# Patient Record
Sex: MALE | Race: WHITE | NOT HISPANIC OR LATINO | Employment: OTHER | URBAN - METROPOLITAN AREA
[De-identification: names, ages, dates, MRNs, and addresses within clinical notes are randomized per-mention and may not be internally consistent; named-entity substitution may affect disease eponyms.]

---

## 2020-01-25 ENCOUNTER — OFFICE VISIT (OUTPATIENT)
Dept: URGENT CARE | Facility: CLINIC | Age: 75
End: 2020-01-25
Payer: MEDICARE

## 2020-01-25 VITALS
HEART RATE: 62 BPM | DIASTOLIC BLOOD PRESSURE: 77 MMHG | WEIGHT: 130 LBS | OXYGEN SATURATION: 99 % | BODY MASS INDEX: 21.66 KG/M2 | HEIGHT: 65 IN | SYSTOLIC BLOOD PRESSURE: 160 MMHG | RESPIRATION RATE: 14 BRPM | TEMPERATURE: 98 F

## 2020-01-25 DIAGNOSIS — H00.012 HORDEOLUM EXTERNUM OF RIGHT LOWER EYELID: Primary | ICD-10-CM

## 2020-01-25 DIAGNOSIS — L03.90 CELLULITIS, UNSPECIFIED CELLULITIS SITE: ICD-10-CM

## 2020-01-25 DIAGNOSIS — H57.89 PERIORBITAL SWELLING: ICD-10-CM

## 2020-01-25 PROCEDURE — 99203 OFFICE O/P NEW LOW 30 MIN: CPT | Mod: S$GLB,,, | Performed by: NURSE PRACTITIONER

## 2020-01-25 PROCEDURE — 99203 PR OFFICE/OUTPT VISIT, NEW, LEVL III, 30-44 MIN: ICD-10-PCS | Mod: S$GLB,,, | Performed by: NURSE PRACTITIONER

## 2020-01-25 RX ORDER — ATENOLOL 25 MG/1
TABLET ORAL
COMMUNITY

## 2020-01-25 RX ORDER — ATORVASTATIN CALCIUM 40 MG/1
40 TABLET, FILM COATED ORAL DAILY
COMMUNITY

## 2020-01-25 RX ORDER — AMLODIPINE BESYLATE 10 MG/1
10 TABLET ORAL DAILY
COMMUNITY

## 2020-01-25 RX ORDER — CEPHALEXIN 500 MG/1
500 CAPSULE ORAL EVERY 8 HOURS
Qty: 21 CAPSULE | Refills: 0 | Status: SHIPPED | OUTPATIENT
Start: 2020-01-25 | End: 2020-01-25

## 2020-01-25 RX ORDER — ERYTHROMYCIN 5 MG/G
OINTMENT OPHTHALMIC 3 TIMES DAILY
Qty: 1 G | Refills: 0 | Status: SHIPPED | OUTPATIENT
Start: 2020-01-25 | End: 2020-02-01

## 2020-01-25 RX ORDER — ERYTHROMYCIN 5 MG/G
OINTMENT OPHTHALMIC 3 TIMES DAILY
Qty: 1 G | Refills: 0 | Status: SHIPPED | OUTPATIENT
Start: 2020-01-25 | End: 2020-01-25

## 2020-01-25 RX ORDER — CEPHALEXIN 500 MG/1
500 CAPSULE ORAL EVERY 8 HOURS
Qty: 21 CAPSULE | Refills: 0 | Status: SHIPPED | OUTPATIENT
Start: 2020-01-25 | End: 2020-02-01

## 2020-01-25 RX ORDER — NAPROXEN SODIUM 220 MG/1
81 TABLET, FILM COATED ORAL DAILY
COMMUNITY

## 2020-01-25 RX ORDER — QUINAPRIL 40 MG/1
40 TABLET ORAL NIGHTLY
COMMUNITY

## 2020-01-25 NOTE — PROGRESS NOTES
"Subjective:       Patient ID: Waldemar Mariscal is a 74 y.o. male.    Vitals:  height is 5' 5" (1.651 m) and weight is 59 kg (130 lb). His oral temperature is 97.9 °F (36.6 °C). His blood pressure is 160/77 (abnormal) and his pulse is 62. His respiration is 14 and oxygen saturation is 99%.     Chief Complaint: Eye Problem    Patient c/o right lower eyelid swelling & redness that started 3 days ago.     Eye Problem    The right eye is affected. This is a new problem. The current episode started in the past 7 days. The problem occurs constantly. The problem has been gradually worsening. There was no injury mechanism. The pain is at a severity of 1/10. The pain is mild. There is no known exposure to pink eye. He does not wear contacts. Associated symptoms include eye redness. Pertinent negatives include no blurred vision, eye discharge, double vision, fever, foreign body sensation, itching, nausea, photophobia, recent URI or vomiting. He has tried nothing for the symptoms.       Constitution: Negative for chills and fever.   HENT: Negative for congestion and sinus pain.    Eyes: Positive for eye pain and eye redness. Negative for eye trauma, foreign body in eye, eye discharge, eye itching, photophobia, vision loss, double vision, blurred vision and eyelid swelling.   Gastrointestinal: Negative for nausea and vomiting.   Skin: Negative for rash.   Allergic/Immunologic: Negative for seasonal allergies and itching.   Neurological: Negative for headaches.       Objective:      Physical Exam   Constitutional: He is oriented to person, place, and time. He appears well-developed and well-nourished.   HENT:   Head: Normocephalic and atraumatic. Head is with right periorbital erythema. Head is without abrasion and without laceration.       Right Ear: External ear normal.   Left Ear: External ear normal.   Nose: Nose normal.   Mouth/Throat: Oropharynx is clear and moist.   Eyes: Pupils are equal, round, and reactive to light. " Conjunctivae and EOM are normal. Right eye exhibits hordeolum.       Neck: Trachea normal, full passive range of motion without pain and phonation normal. Neck supple.   Cardiovascular: Normal rate, regular rhythm and normal heart sounds.   Pulmonary/Chest: Effort normal and breath sounds normal. He has no decreased breath sounds. He has no wheezes. He has no rhonchi.   Musculoskeletal: Normal range of motion.   Neurological: He is alert and oriented to person, place, and time.   Skin: Skin is warm, dry and intact.   Psychiatric: He has a normal mood and affect. His speech is normal and behavior is normal. Judgment and thought content normal. Cognition and memory are normal.   Nursing note and vitals reviewed.        Assessment:       1. Hordeolum externum of right lower eyelid    2. Periorbital swelling    3. Cellulitis, unspecified cellulitis site        Plan:       May use warm compresses  PO abx for cellulitis.  ED precautions given to pt.  Ointment for stye.     Hordeolum externum of right lower eyelid  -     erythromycin (ROMYCIN) ophthalmic ointment; Place into the right eye 3 (three) times daily. for 7 days  Dispense: 1 g; Refill: 0    Periorbital swelling  -     cephALEXin (KEFLEX) 500 MG capsule; Take 1 capsule (500 mg total) by mouth every 8 (eight) hours. for 7 days  Dispense: 21 capsule; Refill: 0    Cellulitis, unspecified cellulitis site  -     cephALEXin (KEFLEX) 500 MG capsule; Take 1 capsule (500 mg total) by mouth every 8 (eight) hours. for 7 days  Dispense: 21 capsule; Refill: 0         Patient Instructions       Sty (or Stye)  A sty is an infection of the oil gland of the eyelid. It may develop into a small pocket of pus (abscess). This can cause pain, redness, and swelling. In early stages, styes are treated with antibiotic cream, eye drops, or warm packs (small towels soaked in warm water). More severe cases may need to be opened and drained by a health care provider.  Home care  · Eye drops  or ointment are usually prescribed to treat the infection. Use these as directed.   ¨ Artificial tears may also be used to lubricate the eye and make it more comfortable. These may be purchased without a prescription.   ¨ Talk to your health care provider before using any over-the-counter treatment for a sty.  · Apply a warm, damp towel to the affected eye for at least 5 minutes, 3 to 4 times a day for a week. Warm compresses open the pores and speed the healing. If the compresses are too hot, they may burn your eyelid.  · Sometimes the sty will drain with this treatment alone. If this happens, continue the antibiotic until all the redness and swelling are gone.  · Wash your hands before and after touching the infected eye to avoid spreading the infection.  · Do not squeeze or try to puncture the sty.  Follow-up care  Follow up with your health care provider, or as advised.   When to seek medical advice  Call your health care provider right away if you have:  · Increase in swelling or redness around the eyelid after 48 to 72 hours  · Increase in eye pain or the eyelid blisters  · Increase in warmth--the eyelid feels hot  · Drainage of blood or thick pus from the sty  · Blister on the eyelid  · Inability to open the eyelid due to swelling  · Fever  ¨ 1 degree above your normal temperature lasting for 24 to 48 hours, or  ¨ Whatever your health care provider told you to report based on your medical condition  · Vision changes  · Headache or stiff neck  · Recurrence of the sty  Date Last Reviewed: 6/14/2015  © 2649-0433 [employer]. [address] [place] [address], [place] [address]. All rights reserved. This information is not intended as a substitute for professional medical care. Always follow your healthcare professional's instructions.        Discharge Instructions for Cellulitis  You have been diagnosed with cellulitis. This is an infection in the deepest layer of the skin. In some cases, the infection also  affects the muscle. Cellulitis is caused by bacteria. The bacteria can enter the body through broken skin. This can happen with a cut, scratch, animal bite, or an insect bite that has been scratched. You may have been treated in the hospital with antibiotics and fluids. You will likely be given a prescription for antibiotics to take at home. This sheet will help you take care of yourself at home.  Home care  When you are home:  · Take the prescribed antibiotic medicine you are given as directed until it is gone. Take it even if you feel better. It treats the infection and stops it from returning. Not taking all the medicine can make future infections hard to treat.  · Keep the infected area clean.  · When possible, raise the infected area above the level of your heart. This helps keep swelling down.  · Talk with your healthcare provider if you are in pain. Ask what kind of over-the-counter medicine you can take for pain.  · Apply clean bandages as advised.  · Take your temperature once a day for a week.  · Wash your hands often to prevent spreading the infection.  In the future, wash your hands before and after you touch cuts, scratches, or bandages. This will help prevent infection.   When to call your healthcare provider  Call your healthcare provider immediately if you have any of the following:  · Difficulty or pain when moving the joints above or below the infected area  · Discharge or pus draining from the area  · Fever of 100.4°F (38°C) or higher, or as directed by your healthcare provider  · Pain that gets worse in or around the infected   · Redness that gets worse in or around the infected area, particularly if the area of redness expands to a wider area  · Shaking chills  · Swelling of the infected area  · Vomiting   Date Last Reviewed: 8/1/2016  © 8889-9032 [employer]. [address] [place] [address], [place] [address]. All rights reserved. This information is not intended as a substitute for  professional medical care. Always follow your healthcare professional's instructions.

## 2020-01-25 NOTE — PATIENT INSTRUCTIONS
Sty (or Stye)  A sty is an infection of the oil gland of the eyelid. It may develop into a small pocket of pus (abscess). This can cause pain, redness, and swelling. In early stages, styes are treated with antibiotic cream, eye drops, or warm packs (small towels soaked in warm water). More severe cases may need to be opened and drained by a health care provider.  Home care  · Eye drops or ointment are usually prescribed to treat the infection. Use these as directed.   ¨ Artificial tears may also be used to lubricate the eye and make it more comfortable. These may be purchased without a prescription.   ¨ Talk to your health care provider before using any over-the-counter treatment for a sty.  · Apply a warm, damp towel to the affected eye for at least 5 minutes, 3 to 4 times a day for a week. Warm compresses open the pores and speed the healing. If the compresses are too hot, they may burn your eyelid.  · Sometimes the sty will drain with this treatment alone. If this happens, continue the antibiotic until all the redness and swelling are gone.  · Wash your hands before and after touching the infected eye to avoid spreading the infection.  · Do not squeeze or try to puncture the sty.  Follow-up care  Follow up with your health care provider, or as advised.   When to seek medical advice  Call your health care provider right away if you have:  · Increase in swelling or redness around the eyelid after 48 to 72 hours  · Increase in eye pain or the eyelid blisters  · Increase in warmth--the eyelid feels hot  · Drainage of blood or thick pus from the sty  · Blister on the eyelid  · Inability to open the eyelid due to swelling  · Fever  ¨ 1 degree above your normal temperature lasting for 24 to 48 hours, or  ¨ Whatever your health care provider told you to report based on your medical condition  · Vision changes  · Headache or stiff neck  · Recurrence of the sty  Date Last Reviewed: 6/14/2015  © 2500-5908 [employer]. [address] [place] [address]. All rights reserved. This information is not intended as a substitute for professional medical care. Always follow your healthcare professional's instructions.        Discharge Instructions for Cellulitis  You have been diagnosed with cellulitis. This is an infection in the deepest layer of the skin. In some cases, the infection also affects the muscle. Cellulitis is caused by bacteria. The bacteria can enter the body through broken skin. This can happen with a cut, scratch, animal bite, or an insect bite that has been scratched. You may have been treated in the hospital with antibiotics and fluids. You will likely be given a prescription for antibiotics to take at home. This sheet will help you take care of yourself at home.  Home care  When you are home:  · Take the prescribed antibiotic medicine you are given as directed until it is gone. Take it even if you feel better. It treats the infection and stops it from returning. Not taking all the medicine can make future infections hard to treat.  · Keep the infected area clean.  · When possible, raise the infected area above the level of your heart. This helps keep swelling down.  · Talk with your healthcare provider if you are in pain. Ask what kind of over-the-counter medicine you can take for pain.  · Apply clean bandages as advised.  · Take your temperature once a day for a week.  · Wash your hands often to prevent spreading the infection.  In the future, wash your hands before and after you touch cuts, scratches, or bandages. This will help prevent infection.   When to call your healthcare provider  Call your healthcare provider immediately if you have any of the following:  · Difficulty or pain when moving the joints above or below the infected area  · Discharge or pus draining from the area  · Fever of 100.4°F (38°C) or higher, or as directed by your healthcare provider  · Pain that gets worse in or  around the infected   · Redness that gets worse in or around the infected area, particularly if the area of redness expands to a wider area  · Shaking chills  · Swelling of the infected area  · Vomiting   Date Last Reviewed: 8/1/2016  © 2693-9985 [employer]. [address] [place] [address]. All rights reserved. This information is not intended as a substitute for professional medical care. Always follow your healthcare professional's instructions.

## 2020-02-03 ENCOUNTER — TELEPHONE (OUTPATIENT)
Dept: URGENT CARE | Facility: CLINIC | Age: 75
End: 2020-02-03

## 2021-03-18 PROBLEM — Z00.00 ENCOUNTER FOR PREVENTIVE HEALTH EXAMINATION: Status: ACTIVE | Noted: 2021-03-18

## 2021-05-04 DIAGNOSIS — C61 MALIGNANT NEOPLASM OF PROSTATE: ICD-10-CM

## 2021-06-16 ENCOUNTER — APPOINTMENT (OUTPATIENT)
Dept: RADIATION ONCOLOGY | Facility: CLINIC | Age: 76
End: 2021-06-16
Payer: MEDICARE

## 2021-06-16 VITALS
DIASTOLIC BLOOD PRESSURE: 73 MMHG | SYSTOLIC BLOOD PRESSURE: 121 MMHG | RESPIRATION RATE: 16 BRPM | OXYGEN SATURATION: 98 % | HEART RATE: 59 BPM

## 2021-06-16 PROCEDURE — 99072 ADDL SUPL MATRL&STAF TM PHE: CPT

## 2021-06-16 PROCEDURE — 99213 OFFICE O/P EST LOW 20 MIN: CPT

## 2021-06-16 NOTE — PHYSICAL EXAM
[General Appearance - Well Developed] : well developed [General Appearance - Well Nourished] : well nourished [Sclera] : the sclera and conjunctiva were normal [Extraocular Movements] : extraocular movements were intact [Outer Ear] : the ears and nose were normal in appearance [Hearing Threshold Finger Rub Not [place]] : hearing was normal [No Focal Deficits] : no focal deficits [Sensation] : the sensory exam was normal to light touch and pinprick [Normal] : oriented to person, place and time, the affect was normal, the mood was normal and not anxious [Oriented To Time, Place, And Person] : oriented to person, place, and time [FreeTextEntry1] : prostate flat, without induration or nodularity; smooth anterior rectal mucosa; no blood on examiner's finger.

## 2021-06-16 NOTE — VITALS
[Maximal Pain Intensity: 0/10] : 0/10 [Least Pain Intensity: 0/10] : 0/10 [NoTreatment Scheduled] : no treatment scheduled [100: Normal, no complaints, no evidence of disease.] : 100: Normal, no complaints, no evidence of disease.

## 2021-06-16 NOTE — HISTORY OF PRESENT ILLNESS
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 [place] scsore 7 (4+3), PSA 8.4 ng/mL/ On 6/13/18 he completed a definitive course of SBRT based radiation therapy with Cyberknife technology with 3500 cGy in 700 cGy per fraction at [facility]. He presents today for a routine followup. \par \par He notes that he is feeling well, with no appreciable symptomatology related to his definitive radiation therapy.  Specifically, he notes better-than prior-to-RT baseline urine function with nocturia x2, while still using flomax 0.4mg at night.  He denies dysuria or incontinence.  He denies blood in the urine.  He has no blood or mucous in the stool, and denies rectal pain.  He denies bowel urgency or issues in that regard, but notes he tries to have a bowel movement and urinate prior to leaving his home.  He is able to have erections.  His PSA was 0.2ng/ml on 6/2/2021.

## 2021-06-16 NOTE — REVIEW OF SYSTEMS
[Nocturia] : nocturia [IPSS Score (0-40): ___] : IPSS score: [unfilled] [EPIC-CP Score (0-60): ___] : EPIC-CP score: [unfilled] [Negative] : Neurological [Abdominal Pain] : no abdominal pain [Constipation] : no constipation [Diarrhea] : no diarrhea [Urinary Frequency] : no urinary frequency [FreeTextEntry8] : nocturia x 2

## 2022-01-19 ENCOUNTER — APPOINTMENT (OUTPATIENT)
Dept: RADIATION ONCOLOGY | Facility: CLINIC | Age: 77
End: 2022-01-19
Payer: MEDICARE

## 2022-01-24 ENCOUNTER — APPOINTMENT (OUTPATIENT)
Dept: RADIATION ONCOLOGY | Facility: CLINIC | Age: 77
End: 2022-01-24
Payer: MEDICARE

## 2022-01-24 PROCEDURE — 99024 POSTOP FOLLOW-UP VISIT: CPT

## 2022-01-24 NOTE — PHYSICAL EXAM
[] : no respiratory distress [Exaggerated Use Of Accessory Muscles For Inspiration] : no accessory muscle use

## 2022-01-24 NOTE — HISTORY OF PRESENT ILLNESS
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 [place] score 7 (4+3), PSA 8.4 ng/mL/ On 6/13/18 he completed a definitive course of SBRT based radiation therapy with Cyberknife technology with 3500 cGy in 700 cGy per fraction at [facility]. Due to concerns regarding covid, he elected for telehealth follow-up.\par \par 1/24/22 -  He notes that he is feeling well, with no appreciable symptomatology related to his definitive radiation therapy.  Specifically, he notes baseline urine function with nocturia x2, while still using flomax 0.4mg at night.  He denies dysuria or incontinence.  He denies blood in the urine.  He has no blood or mucous in the stool, and denies rectal pain.  He is able to have erections without appreciable change.  He and his wife contracted covid recently and he notes they feel well.  His PSA was 0.2 ng/mL on 1/10/22.  \par \par

## 2022-01-24 NOTE — REVIEW OF SYSTEMS
[Abdominal Pain] : no abdominal pain [Constipation] : no constipation [Diarrhea] : no diarrhea [Urinary Frequency] : no urinary frequency [FreeTextEntry8] : nocturia x 2

## 2022-08-04 ENCOUNTER — APPOINTMENT (OUTPATIENT)
Dept: RADIATION ONCOLOGY | Facility: CLINIC | Age: 77
End: 2022-08-04

## 2024-02-23 ENCOUNTER — APPOINTMENT (OUTPATIENT)
Dept: ORTHOPEDIC SURGERY | Facility: CLINIC | Age: 79
End: 2024-02-23
Payer: MEDICARE

## 2024-02-23 DIAGNOSIS — M72.0 PALMAR FASCIAL FIBROMATOSIS [DUPUYTREN]: ICD-10-CM

## 2024-02-23 DIAGNOSIS — M65.331 TRIGGER FINGER, RIGHT MIDDLE FINGER: ICD-10-CM

## 2024-02-23 PROCEDURE — 99204 OFFICE O/P NEW MOD 45 MIN: CPT | Mod: 25

## 2024-02-23 PROCEDURE — 20550 NJX 1 TENDON SHEATH/LIGAMENT: CPT

## 2024-02-23 NOTE — ASSESSMENT
[FreeTextEntry1] : My impression is that the patient has a right long finger trigger finger. Initial treatment options were discussed shared decision-making was made to proceed with a corticosteroid injection into the flexor tendon sheath today. I explained that recurrence of symptoms is not uncommon. If this were to occur, consideration for another injection will be made. I did note that multiple, repeated injections become less efficacious over time and eventually, surgery should be considered. All questions were answered. The patient was well in accordance with the plan. I noted that it may take 1-2 weeks for the steroid to take effect. The patient will follow up with me on an as-needed basis.  I also had a lengthy discussion with the patient today regarding his Dupuytren's condition.  I explained this is a genetic disorder & that it's not curative and no matter how it is treated, recurrence of pathologic cords and finger contracture is possible. Given he has no significant flexion posturing of any of the fingers as well as a negative tabletop test, bilaterally, observation and monitoring was recommended. I explained that intervention would be warranted if there is worsening digital contracture. I mentioned that he should avoid any blunt or penetrating trauma to the palms of his hands and were protective gloves when placing direct pressure or force at the palms. All questions were answered. He was in- accordance with the plan. He will follow up with me on an as-needed basis.

## 2024-02-23 NOTE — HISTORY OF PRESENT ILLNESS
[FreeTextEntry1] : 78 year old male presents for evaluation of atraumatic right long finger discomfort and stiffness which started about 2 months ago. The patient notes that he has some difficulty with his range of motion since the discomfort started. The patient denies any numbness or tingling of the right hand.

## 2024-02-23 NOTE — PHYSICAL EXAM
[de-identified] : Physical exam demonstrates the patient to be alert and oriented x 3 and capable of ambulation. The patient is well-developed and well-nourished in no apparent respiratory distress. The majority of the skin is intact bilaterally in the upper extremities without any bilateral elbow lymphadenopathy.  Full, symmetric digital range of motion.  Tender to palpation over the right long A1 pulley, locking can be appreciated.  Nontender over the MCP joint/collateral ligament.  No collateral ligament instability or extensor tendon subluxation.  Flexor and extensor tendons intact.  Pretendinous Dupuytren's cords and nodules are present in both palms.  Negative tabletop test.  Sensation is intact to light touch.  All fingers are well-perfused.

## 2024-05-01 ENCOUNTER — APPOINTMENT (OUTPATIENT)
Dept: ORTHOPEDIC SURGERY | Facility: CLINIC | Age: 79
End: 2024-05-01
Payer: MEDICARE

## 2024-05-01 ENCOUNTER — RESULT REVIEW (OUTPATIENT)
Age: 79
End: 2024-05-01

## 2024-05-01 ENCOUNTER — OUTPATIENT (OUTPATIENT)
Dept: OUTPATIENT SERVICES | Facility: HOSPITAL | Age: 79
LOS: 1 days | End: 2024-05-01
Payer: MEDICARE

## 2024-05-01 ENCOUNTER — TRANSCRIPTION ENCOUNTER (OUTPATIENT)
Age: 79
End: 2024-05-01

## 2024-05-01 VITALS
DIASTOLIC BLOOD PRESSURE: 78 MMHG | WEIGHT: 130 LBS | OXYGEN SATURATION: 97 % | HEART RATE: 60 BPM | HEIGHT: 66 IN | BODY MASS INDEX: 20.89 KG/M2 | SYSTOLIC BLOOD PRESSURE: 144 MMHG

## 2024-05-01 DIAGNOSIS — M46.1 SACROILIITIS, NOT ELSEWHERE CLASSIFIED: ICD-10-CM

## 2024-05-01 DIAGNOSIS — Z78.9 OTHER SPECIFIED HEALTH STATUS: ICD-10-CM

## 2024-05-01 DIAGNOSIS — Z86.39 PERSONAL HISTORY OF OTHER ENDOCRINE, NUTRITIONAL AND METABOLIC DISEASE: ICD-10-CM

## 2024-05-01 DIAGNOSIS — M76.32 ILIOTIBIAL BAND SYNDROME, LEFT LEG: ICD-10-CM

## 2024-05-01 DIAGNOSIS — Z86.79 PERSONAL HISTORY OF OTHER DISEASES OF THE CIRCULATORY SYSTEM: ICD-10-CM

## 2024-05-01 DIAGNOSIS — Z82.62 FAMILY HISTORY OF OSTEOPOROSIS: ICD-10-CM

## 2024-05-01 PROCEDURE — 73521 X-RAY EXAM HIPS BI 2 VIEWS: CPT | Mod: 26

## 2024-05-01 PROCEDURE — 73564 X-RAY EXAM KNEE 4 OR MORE: CPT | Mod: 26,50

## 2024-05-01 PROCEDURE — 72020 X-RAY EXAM OF SPINE 1 VIEW: CPT | Mod: 26

## 2024-05-01 PROCEDURE — 73564 X-RAY EXAM KNEE 4 OR MORE: CPT

## 2024-05-01 PROCEDURE — 99214 OFFICE O/P EST MOD 30 MIN: CPT

## 2024-05-01 PROCEDURE — 73521 X-RAY EXAM HIPS BI 2 VIEWS: CPT

## 2024-05-01 PROCEDURE — 72020 X-RAY EXAM OF SPINE 1 VIEW: CPT

## 2024-05-01 RX ORDER — RAMIPRIL 10 MG/1
10 CAPSULE ORAL
Refills: 0 | Status: ACTIVE | COMMUNITY

## 2024-05-01 RX ORDER — ATORVASTATIN CALCIUM 40 MG/1
40 TABLET, FILM COATED ORAL
Refills: 0 | Status: ACTIVE | COMMUNITY

## 2024-05-01 RX ORDER — AMLODIPINE BESYLATE 10 MG/1
10 TABLET ORAL
Refills: 0 | Status: ACTIVE | COMMUNITY

## 2024-05-01 RX ORDER — ATENOLOL 25 MG/1
25 TABLET ORAL
Refills: 0 | Status: ACTIVE | COMMUNITY

## 2024-05-01 RX ORDER — TAMSULOSIN HYDROCHLORIDE 0.4 MG/1
0.4 CAPSULE ORAL
Qty: 90 | Refills: 4 | Status: COMPLETED | COMMUNITY
Start: 2022-01-28 | End: 2024-05-01

## 2024-05-01 RX ORDER — CELECOXIB 100 MG/1
100 CAPSULE ORAL TWICE DAILY
Qty: 42 | Refills: 1 | Status: ACTIVE | COMMUNITY
Start: 2024-05-01 | End: 1900-01-01

## 2024-05-01 NOTE — HISTORY OF PRESENT ILLNESS
[de-identified] : 05/01/2024: 78 year old male presenting for initial evaluation of 2.5 months history of low back pain with associated left lateral hip and lower thigh pain, radiating through the lateral thigh and buttock, ending at the anterior knee with no further radiation beyond that. His pain has affected his sleep and ADLs. He is taking naproxen and Tylenol as needed along with cryotherapy for pain. Exercise tolerance has been limited from 3 miles/day to now 1 mile/day, though some of this has been self-imposed.  PMH significant for HTN and HLD, history of prostate cancer

## 2024-05-01 NOTE — DISCUSSION/SUMMARY
[de-identified] : Imp: 78 year old male with lumbar spondylosis, left sacroiliitis, left ITB syndrome, and possible left lumbar radiculopathy. - We will proceed with conservative treatment at this time, including PT, HEP, Tylenol + Celebrex as needed. - He will follow up as needed after PT.

## 2024-05-01 NOTE — PHYSICAL EXAM
[de-identified] : General appearance: well nourished and hydrated, pleasant, alert and oriented x 3, cooperative.   HEENT: normocephalic, EOM intact, wearing mask, external auditory canal clear.   Cardiovascular: no lower leg edema, no varicosities, dorsalis pedis pulses palpable and symmetric.   Lymphatics: no palpable lymphadenopathy, no lymphedema.   Neurologic: sensation is normal, no muscle weakness in upper or lower extremities, patella tendon reflexes present and symmetric.   Dermatologic: skin moist, warm, no rash.   Spine: cervical spine with normal lordosis and painless range of motion, thoracic spine with normal kyphosis and painless range of motion, lumbosacral spine with normal lordosis and painless range of motion.  No tenderness to palpation along midline spine and paraspinal musculature.  Left > right sacroiliac tenderness to palpation. Point TTP along ischial tuberosity. No TTP in ischiofemoral space. Negative SLR and crossed SLR tests bilaterally. Gait: normal.    Limb lengths: clinically equal  Left hip: - Focal soft tissue swelling: none - Ecchymosis: none - Erythema: none - Wounds: none - Tenderness: no tenderness to palpation along the distal extent of ITB, though notes ITB as site of occasional pain; entire quadriceps nontender to palpation - ROM:    - Flexion: 130   - Extension: 0   - Adduction: 15   - Abduction: 70   - Internal rotation in 90 degrees of hip flexion: 10   - External rotation in 90 degrees of hip flexion: 45 - MICHI: negative - FADIR: negative - Kianna: negative - Stinchfield: elicits lower quadriceps pain - Flexor power: 5/5 - Abductor power: 5/5 [de-identified] : A lateral view of the lumbosacral spine, weightbearing AP pelvis, 2 additional views (frog lateral and false profile) of the (left/right) hip, and 4 views of the bilateral knees (weightbearing AP, weightbearing Hinson, weightbearing lateral, and Sunrise) were interpreted by me and reviewed with the patient.  Location of imaging: [facility] Date of exam: 05/01/2024  Lumbar spine -- Alignment: normal Spondylosis: moderate multilevel Listhesis: none Posterior elements: moderate multilevel facet arthrosis  Pelvic alignment: slight obliquity with left side up  Bilateral hips -- Arthritis: mild age-appropriate osteoarthritis TONNIS 1 as well as some bilateral chondrocalcinosis Deformity: none Osteonecrosis: none  Right knee --  Alignment: none Arthritis: none Patellar height: normal Patellar tracking: central - Chondrocalcinosis is present  Left knee --  Alignment: none Arthritis: none Patellar height: normal Patellar tracking: central - Chondrocalcinosis is present

## 2024-05-01 NOTE — ADDENDUM
[FreeTextEntry1] : I, Jr Adair, documented this note as a scribe on behalf of Dr. Randall Pelayo on 05/01/2024.

## 2024-05-01 NOTE — END OF VISIT
[FreeTextEntry3] : All medical record entries made by the Scribe were at my, Dr. Randall Pelayo, direction and personally dictated by me on 05/01/2024. I have reviewed the chart and agree that the record accurately reflects my personal performance of the history, physical exam, assessment and plan. I have also personally directed, reviewed, and agreed with the chart.

## 2024-05-06 ENCOUNTER — TRANSCRIPTION ENCOUNTER (OUTPATIENT)
Age: 79
End: 2024-05-06

## 2024-05-31 ENCOUNTER — APPOINTMENT (OUTPATIENT)
Dept: ORTHOPEDIC SURGERY | Facility: CLINIC | Age: 79
End: 2024-05-31

## 2024-06-26 ENCOUNTER — TRANSCRIPTION ENCOUNTER (OUTPATIENT)
Age: 79
End: 2024-06-26

## 2024-06-26 DIAGNOSIS — M54.16 RADICULOPATHY, LUMBAR REGION: ICD-10-CM

## 2024-07-01 ENCOUNTER — TRANSCRIPTION ENCOUNTER (OUTPATIENT)
Age: 79
End: 2024-07-01

## 2024-07-11 VITALS
HEIGHT: 66 IN | TEMPERATURE: 98 F | WEIGHT: 130.07 LBS | HEART RATE: 90 BPM | RESPIRATION RATE: 18 BRPM | OXYGEN SATURATION: 97 % | SYSTOLIC BLOOD PRESSURE: 138 MMHG | DIASTOLIC BLOOD PRESSURE: 72 MMHG

## 2024-07-11 PROCEDURE — 99285 EMERGENCY DEPT VISIT HI MDM: CPT

## 2024-07-11 NOTE — ED ADULT TRIAGE NOTE - BMI (KG/M2)
General and Vascular Surgery                                                           Daily Progress Note                                                             Meño Zhu M.D. Pt Name: Vinita Cardoso  Medical Record Number: [id number]  Date of Birth 1974   Today's Date: 2/7/2020    ASSESSMENT  1. POD#2 fem distal ( PT) c arm vein  2. History of significant daily alcohol intake , appreciate hospitalist's consult for withdrawal management, patient feels better drinking beer with meals   3 warm left foot incisions doing well good Doppler dorsalis and posterior tibial, palpable graft pulse between incisions at the knee  PLAN      1. Advance diet   2. PT consult  3. Transfer to floor    SUBJECTIVE  Alec Donate has improved from yesterday. Pain is well controlled. He has nausea and no vomiting. He has passed flatus and has not had a bowel movement. He is tolerating ice chips. Current activity is up with assistance    OBJECTIVE  Vitals:    02/06/20 1653 02/06/20 1953 02/07/20 0400 02/07/20 0600   BP: (!) 150/76 135/74 126/69    Pulse: 76 71 69    Resp: 16 14 16    Temp: 98.5 °F (36.9 °C) 97.7 °F (36.5 °C) 97.8 °F (36.6 °C)    TempSrc: Oral Oral Oral    SpO2:  100% 99%    Weight:    177 lb 4 oz (80.4 kg)   Height:           VITALS:  height is 6' (1.829 m) and weight is 177 lb 4 oz (80.4 kg). His oral temperature is 97.8 °F (36.6 °C). His blood pressure is 126/69 and his pulse is 69. His respiration is 16 and oxygen saturation is 99%.    VITALS:  /69   Pulse 69   Temp 97.8 °F (36.6 °C) (Oral)   Resp 16   Ht 6' (1.829 m)   Wt 177 lb 4 oz (80.4 kg)   SpO2 99%   BMI 24.04 kg/m²   INTAKE/OUTPUT:      Intake/Output Summary (Last 24 hours) at 2/7/2020 0809  Last data filed at 2/7/2020 0600  Gross per 24 hour   Intake 4189 ml   Output 3425 ml   Net 764 ml     GENERAL: alert, no distress  LUNGS:  normal air movement, no respiratory 21

## 2024-07-11 NOTE — ED ADULT TRIAGE NOTE - CHIEF COMPLAINT QUOTE
Pt BIBEMS from home for a fall. Was walking and "heard a crack and had severe pain in left upper leg, collasped to the ground". denies head strike, LOC, use of blood thinners. L leg shortened, deformity to left upper thigh.

## 2024-07-12 ENCOUNTER — APPOINTMENT (OUTPATIENT)
Dept: ORTHOPEDIC SURGERY | Facility: HOSPITAL | Age: 79
End: 2024-07-12

## 2024-07-12 ENCOUNTER — INPATIENT (INPATIENT)
Facility: HOSPITAL | Age: 79
LOS: 16 days | Discharge: HOME HEALTH | End: 2024-07-29
Attending: ORTHOPAEDIC SURGERY | Admitting: ORTHOPAEDIC SURGERY
Payer: MEDICARE

## 2024-07-12 LAB
ADD ON TEST-SPECIMEN IN LAB: SIGNIFICANT CHANGE UP
ADD ON TEST-SPECIMEN IN LAB: SIGNIFICANT CHANGE UP
ALBUMIN SERPL ELPH-MCNC: 3.9 G/DL — SIGNIFICANT CHANGE UP (ref 3.3–5)
ALP SERPL-CCNC: 115 U/L — SIGNIFICANT CHANGE UP (ref 40–120)
ALT FLD-CCNC: 23 U/L — SIGNIFICANT CHANGE UP (ref 10–45)
ANION GAP SERPL CALC-SCNC: 11 MMOL/L — SIGNIFICANT CHANGE UP (ref 5–17)
ANION GAP SERPL CALC-SCNC: 12 MMOL/L — SIGNIFICANT CHANGE UP (ref 5–17)
APPEARANCE UR: CLEAR — SIGNIFICANT CHANGE UP
AST SERPL-CCNC: 69 U/L — HIGH (ref 10–40)
BACTERIA # UR AUTO: NEGATIVE /HPF — SIGNIFICANT CHANGE UP
BASOPHILS # BLD AUTO: 0.02 K/UL — SIGNIFICANT CHANGE UP (ref 0–0.2)
BASOPHILS NFR BLD AUTO: 0.1 % — SIGNIFICANT CHANGE UP (ref 0–2)
BILIRUB SERPL-MCNC: 0.6 MG/DL — SIGNIFICANT CHANGE UP (ref 0.2–1.2)
BILIRUB UR-MCNC: NEGATIVE — SIGNIFICANT CHANGE UP
BLD GP AB SCN SERPL QL: NEGATIVE — SIGNIFICANT CHANGE UP
BUN SERPL-MCNC: 26 MG/DL — HIGH (ref 7–23)
BUN SERPL-MCNC: 31 MG/DL — HIGH (ref 7–23)
CALCIUM SERPL-MCNC: 9 MG/DL — SIGNIFICANT CHANGE UP (ref 8.4–10.5)
CALCIUM SERPL-MCNC: 9.4 MG/DL — SIGNIFICANT CHANGE UP (ref 8.4–10.5)
CALCIUM SERPL-MCNC: 9.6 MG/DL — SIGNIFICANT CHANGE UP (ref 8.4–10.5)
CAST: 1 /LPF — SIGNIFICANT CHANGE UP (ref 0–4)
CHLORIDE SERPL-SCNC: 101 MMOL/L — SIGNIFICANT CHANGE UP (ref 96–108)
CHLORIDE SERPL-SCNC: 102 MMOL/L — SIGNIFICANT CHANGE UP (ref 96–108)
CK SERPL-CCNC: 728 U/L — HIGH (ref 30–200)
CO2 SERPL-SCNC: 26 MMOL/L — SIGNIFICANT CHANGE UP (ref 22–31)
CO2 SERPL-SCNC: 27 MMOL/L — SIGNIFICANT CHANGE UP (ref 22–31)
COLOR SPEC: YELLOW — SIGNIFICANT CHANGE UP
CREAT SERPL-MCNC: 0.92 MG/DL — SIGNIFICANT CHANGE UP (ref 0.5–1.3)
CREAT SERPL-MCNC: 0.99 MG/DL — SIGNIFICANT CHANGE UP (ref 0.5–1.3)
CRP SERPL-MCNC: 11.8 MG/L — HIGH (ref 0–4)
DIFF PNL FLD: ABNORMAL
EGFR: 77 ML/MIN/1.73M2 — SIGNIFICANT CHANGE UP
EGFR: 84 ML/MIN/1.73M2 — SIGNIFICANT CHANGE UP
EOSINOPHIL # BLD AUTO: 0 K/UL — SIGNIFICANT CHANGE UP (ref 0–0.5)
EOSINOPHIL NFR BLD AUTO: 0 % — SIGNIFICANT CHANGE UP (ref 0–6)
ERYTHROCYTE [SEDIMENTATION RATE] IN BLOOD: 7 MM/HR — SIGNIFICANT CHANGE UP
GLUCOSE SERPL-MCNC: 108 MG/DL — HIGH (ref 70–99)
GLUCOSE SERPL-MCNC: 120 MG/DL — HIGH (ref 70–99)
GLUCOSE UR QL: NEGATIVE MG/DL — SIGNIFICANT CHANGE UP
HCT VFR BLD CALC: 47.1 % — SIGNIFICANT CHANGE UP (ref 39–50)
HGB BLD-MCNC: 15.7 G/DL — SIGNIFICANT CHANGE UP (ref 13–17)
IMM GRANULOCYTES NFR BLD AUTO: 0.5 % — SIGNIFICANT CHANGE UP (ref 0–0.9)
KETONES UR-MCNC: 15 MG/DL
LEUKOCYTE ESTERASE UR-ACNC: NEGATIVE — SIGNIFICANT CHANGE UP
LYMPHOCYTES # BLD AUTO: 0.77 K/UL — LOW (ref 1–3.3)
LYMPHOCYTES # BLD AUTO: 5.1 % — LOW (ref 13–44)
MCHC RBC-ENTMCNC: 30.8 PG — SIGNIFICANT CHANGE UP (ref 27–34)
MCHC RBC-ENTMCNC: 33.3 GM/DL — SIGNIFICANT CHANGE UP (ref 32–36)
MCV RBC AUTO: 92.5 FL — SIGNIFICANT CHANGE UP (ref 80–100)
MONOCYTES # BLD AUTO: 1.09 K/UL — HIGH (ref 0–0.9)
MONOCYTES NFR BLD AUTO: 7.2 % — SIGNIFICANT CHANGE UP (ref 2–14)
NEUTROPHILS # BLD AUTO: 13.09 K/UL — HIGH (ref 1.8–7.4)
NEUTROPHILS NFR BLD AUTO: 87.1 % — HIGH (ref 43–77)
NITRITE UR-MCNC: NEGATIVE — SIGNIFICANT CHANGE UP
NRBC # BLD: 0 /100 WBCS — SIGNIFICANT CHANGE UP (ref 0–0)
PH UR: 6 — SIGNIFICANT CHANGE UP (ref 5–8)
PLATELET # BLD AUTO: 269 K/UL — SIGNIFICANT CHANGE UP (ref 150–400)
POTASSIUM SERPL-MCNC: 4.2 MMOL/L — SIGNIFICANT CHANGE UP (ref 3.5–5.3)
POTASSIUM SERPL-MCNC: 4.2 MMOL/L — SIGNIFICANT CHANGE UP (ref 3.5–5.3)
POTASSIUM SERPL-SCNC: 4.2 MMOL/L — SIGNIFICANT CHANGE UP (ref 3.5–5.3)
POTASSIUM SERPL-SCNC: 4.2 MMOL/L — SIGNIFICANT CHANGE UP (ref 3.5–5.3)
PROT SERPL-MCNC: 6.9 G/DL — SIGNIFICANT CHANGE UP (ref 6–8.3)
PROT UR-MCNC: 100 MG/DL
RBC # BLD: 5.09 M/UL — SIGNIFICANT CHANGE UP (ref 4.2–5.8)
RBC # FLD: 13.7 % — SIGNIFICANT CHANGE UP (ref 10.3–14.5)
RBC CASTS # UR COMP ASSIST: 1 /HPF — SIGNIFICANT CHANGE UP (ref 0–4)
RH IG SCN BLD-IMP: POSITIVE — SIGNIFICANT CHANGE UP
SODIUM SERPL-SCNC: 139 MMOL/L — SIGNIFICANT CHANGE UP (ref 135–145)
SODIUM SERPL-SCNC: 140 MMOL/L — SIGNIFICANT CHANGE UP (ref 135–145)
SP GR SPEC: 1.02 — SIGNIFICANT CHANGE UP (ref 1–1.03)
SQUAMOUS # UR AUTO: 0 /HPF — SIGNIFICANT CHANGE UP (ref 0–5)
T3 SERPL-MCNC: 120 NG/DL — SIGNIFICANT CHANGE UP (ref 80–200)
T4 AB SER-ACNC: 8.49 UG/DL — SIGNIFICANT CHANGE UP (ref 4.5–11.7)
T4 FREE SERPL-MCNC: 1.42 NG/DL — SIGNIFICANT CHANGE UP (ref 0.93–1.7)
TESTOST FREE+TOTAL PANEL SERPL-MCNC: 189 NG/DL — LOW (ref 300–740)
TSH SERPL-MCNC: 2.46 UIU/ML — SIGNIFICANT CHANGE UP (ref 0.27–4.2)
TSH SERPL-MCNC: 3.09 UIU/ML — SIGNIFICANT CHANGE UP (ref 0.27–4.2)
UROBILINOGEN FLD QL: 0.2 MG/DL — SIGNIFICANT CHANGE UP (ref 0.2–1)
WBC # BLD: 15.04 K/UL — HIGH (ref 3.8–10.5)
WBC # FLD AUTO: 15.04 K/UL — HIGH (ref 3.8–10.5)
WBC UR QL: 1 /HPF — SIGNIFICANT CHANGE UP (ref 0–5)

## 2024-07-12 PROCEDURE — 71045 X-RAY EXAM CHEST 1 VIEW: CPT | Mod: 26

## 2024-07-12 PROCEDURE — 71260 CT THORAX DX C+: CPT | Mod: 26

## 2024-07-12 PROCEDURE — 73552 X-RAY EXAM OF FEMUR 2/>: CPT | Mod: 26,LT,76

## 2024-07-12 PROCEDURE — 99222 1ST HOSP IP/OBS MODERATE 55: CPT

## 2024-07-12 PROCEDURE — 74177 CT ABD & PELVIS W/CONTRAST: CPT | Mod: 26

## 2024-07-12 PROCEDURE — 73502 X-RAY EXAM HIP UNI 2-3 VIEWS: CPT | Mod: 26,LT

## 2024-07-12 PROCEDURE — 73701 CT LOWER EXTREMITY W/DYE: CPT | Mod: 26,LT

## 2024-07-12 PROCEDURE — 93010 ELECTROCARDIOGRAM REPORT: CPT

## 2024-07-12 PROCEDURE — 99223 1ST HOSP IP/OBS HIGH 75: CPT

## 2024-07-12 RX ORDER — ACETAMINOPHEN 500 MG
1000 TABLET ORAL EVERY 8 HOURS
Refills: 0 | Status: DISCONTINUED | OUTPATIENT
Start: 2024-07-12 | End: 2024-07-29

## 2024-07-12 RX ORDER — ENOXAPARIN SODIUM 120 MG/.8ML
40 INJECTION SUBCUTANEOUS EVERY 24 HOURS
Refills: 0 | Status: DISCONTINUED | OUTPATIENT
Start: 2024-07-12 | End: 2024-07-16

## 2024-07-12 RX ORDER — HYDROMORPHONE HCL IN 0.9% NACL 0.2 MG/ML
0.5 PLASTIC BAG, INJECTION (ML) INTRAVENOUS ONCE
Refills: 0 | Status: DISCONTINUED | OUTPATIENT
Start: 2024-07-12 | End: 2024-07-12

## 2024-07-12 RX ORDER — ATORVASTATIN CALCIUM 40 MG/1
40 TABLET, FILM COATED ORAL AT BEDTIME
Refills: 0 | Status: DISCONTINUED | OUTPATIENT
Start: 2024-07-12 | End: 2024-07-29

## 2024-07-12 RX ORDER — CHLORHEXIDINE GLUCONATE 500 MG/1
1 CLOTH TOPICAL EVERY 12 HOURS
Refills: 0 | Status: COMPLETED | OUTPATIENT
Start: 2024-07-12 | End: 2024-07-13

## 2024-07-12 RX ORDER — OXYCODONE HYDROCHLORIDE 30 MG/1
5 TABLET ORAL EVERY 4 HOURS
Refills: 0 | Status: DISCONTINUED | OUTPATIENT
Start: 2024-07-12 | End: 2024-07-19

## 2024-07-12 RX ORDER — POVIDONE-IODINE 0.1 G/ML
1 SOLUTION TOPICAL ONCE
Refills: 0 | Status: COMPLETED | OUTPATIENT
Start: 2024-07-12 | End: 2024-07-12

## 2024-07-12 RX ORDER — DEXTROSE MONOHYDRATE, SODIUM CHLORIDE, SODIUM LACTATE, CALCIUM CHLORIDE, MAGNESIUM CHLORIDE 1.5; 538; 448; 18.4; 5.08 G/100ML; MG/100ML; MG/100ML; MG/100ML; MG/100ML
1000 SOLUTION INTRAPERITONEAL
Refills: 0 | Status: DISCONTINUED | OUTPATIENT
Start: 2024-07-12 | End: 2024-07-14

## 2024-07-12 RX ORDER — HYDROMORPHONE HCL IN 0.9% NACL 0.2 MG/ML
0.5 PLASTIC BAG, INJECTION (ML) INTRAVENOUS EVERY 4 HOURS
Refills: 0 | Status: DISCONTINUED | OUTPATIENT
Start: 2024-07-12 | End: 2024-07-19

## 2024-07-12 RX ORDER — ATENOLOL 100 MG/1
25 TABLET ORAL DAILY
Refills: 0 | Status: DISCONTINUED | OUTPATIENT
Start: 2024-07-12 | End: 2024-07-29

## 2024-07-12 RX ORDER — AMLODIPINE BESYLATE 2.5 MG/1
10 TABLET ORAL DAILY
Refills: 0 | Status: DISCONTINUED | OUTPATIENT
Start: 2024-07-12 | End: 2024-07-29

## 2024-07-12 RX ORDER — LORAZEPAM 1 MG/1
0.5 TABLET ORAL ONCE
Refills: 0 | Status: DISCONTINUED | OUTPATIENT
Start: 2024-07-12 | End: 2024-07-12

## 2024-07-12 RX ORDER — ONDANSETRON HCL/PF 4 MG/2 ML
4 VIAL (ML) INJECTION ONCE
Refills: 0 | Status: COMPLETED | OUTPATIENT
Start: 2024-07-12 | End: 2024-07-12

## 2024-07-12 RX ORDER — OXYCODONE HYDROCHLORIDE 30 MG/1
10 TABLET ORAL EVERY 4 HOURS
Refills: 0 | Status: DISCONTINUED | OUTPATIENT
Start: 2024-07-12 | End: 2024-07-19

## 2024-07-12 RX ADMIN — ENOXAPARIN SODIUM 40 MILLIGRAM(S): 120 INJECTION SUBCUTANEOUS at 19:21

## 2024-07-12 RX ADMIN — POVIDONE-IODINE 1 APPLICATION(S): 0.1 SOLUTION TOPICAL at 15:56

## 2024-07-12 RX ADMIN — Medication 1000 MILLIGRAM(S): at 14:17

## 2024-07-12 RX ADMIN — Medication 0.5 MILLIGRAM(S): at 05:42

## 2024-07-12 RX ADMIN — OXYCODONE HYDROCHLORIDE 10 MILLIGRAM(S): 30 TABLET ORAL at 08:00

## 2024-07-12 RX ADMIN — DEXTROSE MONOHYDRATE, SODIUM CHLORIDE, SODIUM LACTATE, CALCIUM CHLORIDE, MAGNESIUM CHLORIDE 100 MILLILITER(S): 1.5; 538; 448; 18.4; 5.08 SOLUTION INTRAPERITONEAL at 11:01

## 2024-07-12 RX ADMIN — Medication 0.5 MILLIGRAM(S): at 09:20

## 2024-07-12 RX ADMIN — Medication 1000 MILLIGRAM(S): at 22:52

## 2024-07-12 RX ADMIN — ATORVASTATIN CALCIUM 40 MILLIGRAM(S): 40 TABLET, FILM COATED ORAL at 21:52

## 2024-07-12 RX ADMIN — Medication 1000 MILLIGRAM(S): at 05:46

## 2024-07-12 RX ADMIN — CHLORHEXIDINE GLUCONATE 1 APPLICATION(S): 500 CLOTH TOPICAL at 11:00

## 2024-07-12 RX ADMIN — LORAZEPAM 0.5 MILLIGRAM(S): 1 TABLET ORAL at 22:31

## 2024-07-12 RX ADMIN — Medication 1000 MILLIGRAM(S): at 15:06

## 2024-07-12 RX ADMIN — Medication 0.5 MILLIGRAM(S): at 05:12

## 2024-07-12 RX ADMIN — Medication 1000 MILLIGRAM(S): at 21:52

## 2024-07-12 RX ADMIN — OXYCODONE HYDROCHLORIDE 10 MILLIGRAM(S): 30 TABLET ORAL at 07:14

## 2024-07-12 RX ADMIN — Medication 0.5 MILLIGRAM(S): at 09:50

## 2024-07-12 RX ADMIN — Medication 4 MILLIGRAM(S): at 02:33

## 2024-07-12 RX ADMIN — Medication 0.5 MILLIGRAM(S): at 15:30

## 2024-07-12 RX ADMIN — Medication 0.5 MILLIGRAM(S): at 15:06

## 2024-07-12 RX ADMIN — Medication 2 MILLIGRAM(S): at 02:33

## 2024-07-12 NOTE — ED ADULT NURSE NOTE - OBJECTIVE STATEMENT
Patient received awake and alert times 4 s/p fall from ground. heard a cracking sound and collapsed. Denies HS LOC, , thinners at this time. Able to speak in full sentences. Shortening noted to L leg. IV line placed. Pending orders. Care plan ongoing.

## 2024-07-12 NOTE — CONSULT NOTE ADULT - SUBJECTIVE AND OBJECTIVE BOX
GIOVANNI BARNES  79y  Male      Patient is a 79y old  Male who presents with a chief complaint of   HPI:   79yMale w/  c/o L thigh pain s/p mechanical fall. Unable to bear weight in the LLE since the fall. Denies headstrike or LOC. Denies numbness/tingling in the LLE. Denies any other trauma/injuries at this time. At baseline, community ambulator w/o assistive devices.    Patient is an 79YM with PMH of HTN presenting after a mechanical fall. Found to have left femoral shaft fracture, plan for intermedullary nail placement on 7/12 am.    T(C): 36.8 (07-11-24 @ 23:41), Max: 36.8 (07-11-24 @ 23:41)  HR: 98 (07-12-24 @ 02:54) (90 - 98)  BP: 131/68 (07-12-24 @ 02:54) (131/68 - 138/72)  RR: 22 (07-12-24 @ 02:54) (18 - 22)  SpO2: 98% (07-12-24 @ 02:54) (97% - 98%)  Wt(kg): --Vital Signs Last 24 Hrs  T(C): 36.8 (11 Jul 2024 23:41), Max: 36.8 (11 Jul 2024 23:41)  T(F): 98.2 (11 Jul 2024 23:41), Max: 98.2 (11 Jul 2024 23:41)  HR: 98 (12 Jul 2024 02:54) (90 - 98)  BP: 131/68 (12 Jul 2024 02:54) (131/68 - 138/72)  BP(mean): --  RR: 22 (12 Jul 2024 02:54) (18 - 22)  SpO2: 98% (12 Jul 2024 02:54) (97% - 98%)    Parameters below as of 11 Jul 2024 23:41  Patient On (Oxygen Delivery Method): room air    PHYSICAL EXAM:  GENERAL: NAD, well-groomed, well-developed  HEAD:  Atraumatic, Normocephalic  EYES: EOMI, PERRLA, conjunctiva and sclera clear  ENMT: No tonsillar erythema, exudates, or enlargement; Moist mucous membranes, Good dentition, No lesions  NECK: Supple, No JVD, Normal thyroid  NERVOUS SYSTEM:  Alert & Oriented X3, Good concentration; Motor Strength 5/5 B/L upper and lower extremities; DTRs 2+ intact and symmetric  CHEST/LUNG: Clear to percussion bilaterally; No rales, rhonchi, wheezing, or rubs  HEART: Regular rate and rhythm; No murmurs, rubs, or gallops  ABDOMEN: Soft, Nontender, Nondistended; Bowel sounds present  EXTREMITIES:  2+ Peripheral Pulses, No clubbing, cyanosis, or edema  LYMPH: No lymphadenopathy noted  SKIN: No rashes or lesions    Consultant(s) Notes Reviewed:  [x ] YES  [ ] NO  Care Discussed with Consultants/Other Providers [ x] YES  [ ] NO    LABS:  CBC   07-12-24 @ 00:18  Hematcorit 47.1  Hemoglobin 15.7  Mean Cell Hemoglobin 30.8  Platelet Count-Automated 269  RBC Count 5.09  Red Cell Distrib Width 13.7  Wbc Count 15.04      BMP  07-12-24 @ 00:18  Anion Gap. Serum 12  Blood Urea Nitrogen,Serm 31  Calcium, Total Serum 9.6  Carbon Dioxide, Serum 26  Chloride, Serum 101  Creatinine, Serum 0.99  eGFR in  --  eGFR in Non Afican American --  Gloucose, serum 120  Potassium, Serum 4.2  Sodium, Serum 139      CMP  07-12-24 @ 00:18  Darlyn Aminotransferase(ALT/SGPT)--  Albumin, Serum --  Alkaline Phosphatase, Serum --  Anion Gap, Serum 12  Aspartate Aminotransferase (AST/SGOT)--  Bilirubin Total, Serum --  Blood Urea Nitrogen, Serum 31  Calcium,Total Serum 9.6  Carbon Dioxide, Serum 26  Chloride, Serum 101  Creatinine, Serum 0.99  eGFR if  --  eGFR if Non African American --  Glucose, Serum 120  Potassium, Serum 4.2  Protein Total, Serum --  Sodium, Serum 139    PT/INR  PT/INR  07-12-24 @ 00:18  INR 0.96  Prothrombin Time Comment --  Prothrobin Time, [id number]      Amylase/Lipase    RADIOLOGY & ADDITIONAL TESTS:    Imaging Personally Reviewed:  [ ] YES  [ ] NO GIOVANNI BARNES  79y  Male      Patient is a 79y old  Male who presents with a chief complaint of   HPI:   79yMale w/  c/o L thigh pain s/p mechanical fall. Unable to bear weight in the LLE since the fall. Denies headstrike or LOC. Denies numbness/tingling in the LLE. Denies any other trauma/injuries at this time. At baseline, community ambulator w/o assistive devices.    Patient is an 79YM with PMH of HTN and HLD presenting after a mechanical fall. Found to have left femoral shaft fracture, plan for intermedullary nail placement on 7/12 am.    T(C): 36.8 (07-11-24 @ 23:41), Max: 36.8 (07-11-24 @ 23:41)  HR: 98 (07-12-24 @ 02:54) (90 - 98)  BP: 131/68 (07-12-24 @ 02:54) (131/68 - 138/72)  RR: 22 (07-12-24 @ 02:54) (18 - 22)  SpO2: 98% (07-12-24 @ 02:54) (97% - 98%)  Wt(kg): --Vital Signs Last 24 Hrs  T(C): 36.8 (11 Jul 2024 23:41), Max: 36.8 (11 Jul 2024 23:41)  T(F): 98.2 (11 Jul 2024 23:41), Max: 98.2 (11 Jul 2024 23:41)  HR: 98 (12 Jul 2024 02:54) (90 - 98)  BP: 131/68 (12 Jul 2024 02:54) (131/68 - 138/72)  BP(mean): --  RR: 22 (12 Jul 2024 02:54) (18 - 22)  SpO2: 98% (12 Jul 2024 02:54) (97% - 98%)    Parameters below as of 11 Jul 2024 23:41  Patient On (Oxygen Delivery Method): room air    PHYSICAL EXAM:  GENERAL: NAD, well-groomed, well-developed  HEAD:  Atraumatic, Normocephalic  EYES: EOMI, PERRLA, conjunctiva and sclera clear  ENMT: No tonsillar erythema, exudates, or enlargement; Moist mucous membranes, Good dentition, No lesions  NECK: Supple, No JVD, Normal thyroid  NERVOUS SYSTEM:  Alert & Oriented X3, Good concentration; Motor Strength 5/5 B/L upper and lower extremities; DTRs 2+ intact and symmetric  CHEST/LUNG: Clear to percussion bilaterally; No rales, rhonchi, wheezing, or rubs  HEART: Regular rate and rhythm; No murmurs, rubs, or gallops  ABDOMEN: Soft, Nontender, Nondistended; Bowel sounds present  EXTREMITIES:  2+ Peripheral Pulses, No clubbing, cyanosis, or edema  LYMPH: No lymphadenopathy noted  SKIN: No rashes or lesions    Consultant(s) Notes Reviewed:  [x ] YES  [ ] NO  Care Discussed with Consultants/Other Providers [ x] YES  [ ] NO    LABS:  CBC   07-12-24 @ 00:18  Hematcorit 47.1  Hemoglobin 15.7  Mean Cell Hemoglobin 30.8  Platelet Count-Automated 269  RBC Count 5.09  Red Cell Distrib Width 13.7  Wbc Count 15.04      BMP  07-12-24 @ 00:18  Anion Gap. Serum 12  Blood Urea Nitrogen,Serm 31  Calcium, Total Serum 9.6  Carbon Dioxide, Serum 26  Chloride, Serum 101  Creatinine, Serum 0.99  eGFR in  --  eGFR in Non Afican American --  Gloucose, serum 120  Potassium, Serum 4.2  Sodium, Serum 139      CMP  07-12-24 @ 00:18  Darlyn Aminotransferase(ALT/SGPT)--  Albumin, Serum --  Alkaline Phosphatase, Serum --  Anion Gap, Serum 12  Aspartate Aminotransferase (AST/SGOT)--  Bilirubin Total, Serum --  Blood Urea Nitrogen, Serum 31  Calcium,Total Serum 9.6  Carbon Dioxide, Serum 26  Chloride, Serum 101  Creatinine, Serum 0.99  eGFR if  --  eGFR if Non African American --  Glucose, Serum 120  Potassium, Serum 4.2  Protein Total, Serum --  Sodium, Serum 139    PT/INR  PT/INR  07-12-24 @ 00:18  INR 0.96  Prothrombin Time Comment --  Prothrobin Time, [id number]      Amylase/Lipase    RADIOLOGY & ADDITIONAL TESTS:    Imaging Personally Reviewed:  [ ] YES  [ ] NO CAMERON GIOVANNI  79y  Male      Patient is a 79y old  Male who presents with a chief complaint of   HPI:   79yMale w/  c/o L thigh pain s/p mechanical fall. Unable to bear weight in the LLE since the fall. Denies headstrike or LOC. Denies numbness/tingling in the LLE. Denies any other trauma/injuries at this time. At baseline, community ambulator w/o assistive devices.    Patient is an 79YM with PMH of HTN, osteoporosis, aortic aneurysm (4 cm), elevated calcium score, presenting after a mechanical fall. States that he was standing in the kitchen microwaving dinner when his leg gave out. Denies headstrike but sustained a left femoral shaft fracture, plan for intermedullary nail placement on 7/12 am. Reports for the past four months he has had worsening left lower extremity pain, originally thought to be inflammation of his IT band. Was using theragun on left thigh muscle for about two weeks and sustained bruise and swelling to the area after using it. In terms of functional capacity, about a month ago, patient was able to walk 1 mile without pain. Walking or movement never limited due to SOB or chest pain. And prior to four months ago, he was able to walk 1-3 mi daily without issue. He was also able to climb 5-6 flights of stairs. Presently denies fevers, chill, CP, SOB, N/V/D/C or abdominal.     T(C): 36.8 (07-11-24 @ 23:41), Max: 36.8 (07-11-24 @ 23:41)  HR: 98 (07-12-24 @ 02:54) (90 - 98)  BP: 131/68 (07-12-24 @ 02:54) (131/68 - 138/72)  RR: 22 (07-12-24 @ 02:54) (18 - 22)  SpO2: 98% (07-12-24 @ 02:54) (97% - 98%)  Wt(kg): --Vital Signs Last 24 Hrs  T(C): 36.8 (11 Jul 2024 23:41), Max: 36.8 (11 Jul 2024 23:41)  T(F): 98.2 (11 Jul 2024 23:41), Max: 98.2 (11 Jul 2024 23:41)  HR: 98 (12 Jul 2024 02:54) (90 - 98)  BP: 131/68 (12 Jul 2024 02:54) (131/68 - 138/72)  BP(mean): --  RR: 22 (12 Jul 2024 02:54) (18 - 22)  SpO2: 98% (12 Jul 2024 02:54) (97% - 98%)    Parameters below as of 11 Jul 2024 23:41  Patient On (Oxygen Delivery Method): room air    PHYSICAL EXAM:  GENERAL: pleasant and conversant, NAD  HEAD:  Atraumatic, Normocephalic  EYES: EOMI. conjunctiva and sclera clear  NERVOUS SYSTEM:  Alert & Oriented X3, Good concentration  CHEST/LUNG: Clear to percussion bilaterally; No rales, rhonchi, wheezing, or rubs  HEART: Regular rate and rhythm; No murmurs, rubs, or gallops  ABDOMEN: Soft, Nontender, Nondistended; Bowel sounds present  EXTREMITIES:  2+ Peripheral Pulses, notable edema in left thigh     Consultant(s) Notes Reviewed:  [x ] YES  [ ] NO  Care Discussed with Consultants/Other Providers [ x] YES  [ ] NO    LABS:  CBC   07-12-24 @ 00:18  Hematcorit 47.1  Hemoglobin 15.7  Mean Cell Hemoglobin 30.8  Platelet Count-Automated 269  RBC Count 5.09  Red Cell Distrib Width 13.7  Wbc Count 15.04      BMP  07-12-24 @ 00:18  Anion Gap. Serum 12  Blood Urea Nitrogen,Serm 31  Calcium, Total Serum 9.6  Carbon Dioxide, Serum 26  Chloride, Serum 101  Creatinine, Serum 0.99  eGFR in  --  eGFR in Non Afican American --  Gloucose, serum 120  Potassium, Serum 4.2  Sodium, Serum 139      CMP  07-12-24 @ 00:18  Darlyn Aminotransferase(ALT/SGPT)--  Albumin, Serum --  Alkaline Phosphatase, Serum --  Anion Gap, Serum 12  Aspartate Aminotransferase (AST/SGOT)--  Bilirubin Total, Serum --  Blood Urea Nitrogen, Serum 31  Calcium,Total Serum 9.6  Carbon Dioxide, Serum 26  Chloride, Serum 101  Creatinine, Serum 0.99  eGFR if  --  eGFR if Non African American --  Glucose, Serum 120  Potassium, Serum 4.2  Protein Total, Serum --  Sodium, Serum 139    PT/INR  PT/INR  07-12-24 @ 00:18  INR 0.96  Prothrombin Time Comment --  Prothrobin Time, [id number]      Amylase/Lipase    RADIOLOGY & ADDITIONAL TESTS:    Imaging Personally Reviewed:  [ ] YES  [ ] NO GIOVANNI BARNES  79y  Male      Patient is a 79y old  Male who presents with a chief complaint of   HPI:   79yMale w/  c/o L thigh pain s/p mechanical fall. Unable to bear weight in the LLE since the fall. Denies headstrike or LOC. Denies numbness/tingling in the LLE. Denies any other trauma/injuries at this time. At baseline, community ambulator w/o assistive devices.    Patient is an 79YM with PMH of HTN, osteoporosis, aortic aneurysm (4 cm), elevated calcium score, presenting after a mechanical fall. States that he was standing in the kitchen microwaving dinner when his leg gave out. Denies headstrike but sustained a left femoral shaft fracture, plan for intermedullary nail placement on 7/12 am. Reports for the past four months he has had worsening left lower extremity pain, originally thought to be inflammation of his IT band. Was using theragun on left thigh muscle for about two weeks and sustained bruise and swelling to the area after using it. In terms of functional capacity, about a month ago, patient was able to walk 1 mile without pain. Walking or movement never limited due to SOB or chest pain. And prior to four months ago, he was able to walk 1-3 mi daily without issue. He was also able to climb 5-6 flights of stairs. Presently denies fevers, chill, CP, SOB, N/V/D/C or abdominal pain.     T(C): 36.8 (07-11-24 @ 23:41), Max: 36.8 (07-11-24 @ 23:41)  HR: 98 (07-12-24 @ 02:54) (90 - 98)  BP: 131/68 (07-12-24 @ 02:54) (131/68 - 138/72)  RR: 22 (07-12-24 @ 02:54) (18 - 22)  SpO2: 98% (07-12-24 @ 02:54) (97% - 98%)  Wt(kg): --Vital Signs Last 24 Hrs  T(C): 36.8 (11 Jul 2024 23:41), Max: 36.8 (11 Jul 2024 23:41)  T(F): 98.2 (11 Jul 2024 23:41), Max: 98.2 (11 Jul 2024 23:41)  HR: 98 (12 Jul 2024 02:54) (90 - 98)  BP: 131/68 (12 Jul 2024 02:54) (131/68 - 138/72)  BP(mean): --  RR: 22 (12 Jul 2024 02:54) (18 - 22)  SpO2: 98% (12 Jul 2024 02:54) (97% - 98%)    Parameters below as of 11 Jul 2024 23:41  Patient On (Oxygen Delivery Method): room air    PHYSICAL EXAM:  GENERAL: pleasant and conversant, NAD  HEAD:  Atraumatic, Normocephalic  EYES: EOMI. conjunctiva and sclera clear  NERVOUS SYSTEM:  Alert & Oriented X3, Good concentration  CHEST/LUNG: Clear to percussion bilaterally; No rales, rhonchi, wheezing, or rubs  HEART: Regular rate and rhythm; No murmurs, rubs, or gallops  ABDOMEN: Soft, Nontender, Nondistended; Bowel sounds present  EXTREMITIES:  2+ Peripheral Pulses, notable edema in left thigh     Consultant(s) Notes Reviewed:  [x ] YES  [ ] NO  Care Discussed with Consultants/Other Providers [ x] YES  [ ] NO    LABS:  CBC   07-12-24 @ 00:18  Hematcorit 47.1  Hemoglobin 15.7  Mean Cell Hemoglobin 30.8  Platelet Count-Automated 269  RBC Count 5.09  Red Cell Distrib Width 13.7  Wbc Count 15.04      BMP  07-12-24 @ 00:18  Anion Gap. Serum 12  Blood Urea Nitrogen,Serm 31  Calcium, Total Serum 9.6  Carbon Dioxide, Serum 26  Chloride, Serum 101  Creatinine, Serum 0.99  eGFR in  --  eGFR in Non Afican American --  Gloucose, serum 120  Potassium, Serum 4.2  Sodium, Serum 139      CMP  07-12-24 @ 00:18  Darlyn Aminotransferase(ALT/SGPT)--  Albumin, Serum --  Alkaline Phosphatase, Serum --  Anion Gap, Serum 12  Aspartate Aminotransferase (AST/SGOT)--  Bilirubin Total, Serum --  Blood Urea Nitrogen, Serum 31  Calcium,Total Serum 9.6  Carbon Dioxide, Serum 26  Chloride, Serum 101  Creatinine, Serum 0.99  eGFR if  --  eGFR if Non African American --  Glucose, Serum 120  Potassium, Serum 4.2  Protein Total, Serum --  Sodium, Serum 139    PT/INR  PT/INR  07-12-24 @ 00:18  INR 0.96  Prothrombin Time Comment --  Prothrobin Time, [id number]      Amylase/Lipase    RADIOLOGY & ADDITIONAL TESTS:    Imaging Personally Reviewed:  [ ] YES  [ ] NO

## 2024-07-12 NOTE — ED PROVIDER NOTE - ATTENDING APP SHARED VISIT CONTRIBUTION OF CARE
80 yo male with h/o HTN, HLD in the ER after fall at home tonight. Pt states his left leg collapsed and he fell on the floor. Denies head injury or LOC. Pt was not able to get up due to pain . Pt states he managed to get to his phone and call EMS. Deformity noted to left thigh. Pt placed in immobilizer by EMS. left LE appears shortened. Pt denies any other injury  pt a&o X 4, deformity to left LE on exam, highly suspicious for acute fx. neurovascular LE intact.   Xray findings consistent with acute femoral shaft fx. Ortho consulted. will admit for further management, most likely OR.

## 2024-07-12 NOTE — ED PROVIDER NOTE - CLINICAL SUMMARY MEDICAL DECISION MAKING FREE TEXT BOX
78 yo male with h/o HTN, HLD in the ER after fall at home tonight. Pt states his left leg collapsed and he fell on the floor. Denies head injury or LOC. Pt was not able to get up due to pain . Pt states he managed to get to his phone and call EMS. Deformity noted to left thigh. Pt placed in immobilizer by EMS. left LE appears shortened. Pt denies any other injury  pt a&o X 4, deformity to left LE on exam, highly suspicious for acute fx. neurovascular LE intact.   Xray findings consistent with acute femoral shaft fx. Ortho consulted. will admit for further management, most likely OR.

## 2024-07-12 NOTE — ED PROVIDER NOTE - PHYSICAL EXAMINATION
Constitutional: awake and alert, in no acute distress  HEENT: head normocephalic and atraumatic. moist mucous membranes  Eyes: extraocular movements intact, normal conjunctiva  Neck: supple, normal ROM  Cardiovascular: regular rate, no r/g/m  Pulmonary: no respiratory distress, lungs- CTA b/l  Gastrointestinal: abdomen flat and nondistended, NT  Skin: warm, dry, normal for ethnicity  Musculoskeletal:  LE shortened, left anterior thigh edema, firm and tender, no ecchymosis, good distal pulses,   Neurological: oriented x4, no focal neurologic deficit.   Psychiatric: calm and cooperative, no SI/HI

## 2024-07-12 NOTE — PATIENT PROFILE ADULT - FALL HARM RISK - HARM RISK INTERVENTIONS

## 2024-07-12 NOTE — CONSULT NOTE ADULT - ASSESSMENT
Patient is an 79YM with PMH of HTN presenting after a mechanical fall. Found to have left femoral shaft fracture, plan for intermedullary nail placement on 7/12 am.    #Pre-operative assessment:   RCRI:  Jah:  Functional status:   NSQIP:      Patient is an 79YM with PMH of HTN presenting after a mechanical fall. Found to have left femoral shaft fracture, plan for intermedullary nail placement on 7/12 am.    #Pre-operative assessment:   EKG: NSR; HR 77, , qtc 461   RCRI: Class II risk (6.0 % 30-day risk of death, MI, or cardiac arrest)  Tyson: 0.2 %; Risk of myocardial infarction or cardiac arrest, intraoperatively or up to 30 days post-op  Functional status: prior to IT band pain four months ago patient walked 1-3 miles without issue; has been using a cane for past two weeks due to pain   NSQIP: 4.7% risk of serious complication which is below average risk of 6.5%; below average risk of any complication (patient risk 5.4% vs. average 7.8%).     #HTN:   Home medications: atenolol 25 mg QD, amlodipine 10 mg and ramipril 10 mg   - recommend continuation of atenolol and amlodipine     #HLD:  Home medication: lipitor 40 mg QHS   - recommend continuing home medication  Patient is an 79YM with PMH of HTN presenting after a mechanical fall. Found to have left femoral shaft fracture, plan for intermedullary nail placement on 7/12 am.    #Pre-operative assessment:   EKG: NSR; HR 77, , qtc 461   RCRI: Class II risk (6.0 % 30-day risk of death, MI, or cardiac arrest)  Tyson: 0.2 %; Risk of myocardial infarction or cardiac arrest, intraoperatively or up to 30 days post-op  Functional status: prior to IT band pain four months ago patient walked 1-3 miles without issue; has been using a cane for past two weeks due to pain   NSQIP: 4.7% risk of serious complication which is below average risk of 6.5%; below average risk of any complication (patient risk 5.4% vs. average 7.8%).   - patient considered low risk for an intermediate risk procedure     #Rhabdomyolysis:  Patient presenting with mild elevation to  with moderate blood in urine.   - recommend gentle hydration     #HTN:   Home medications: atenolol 25 mg QD, amlodipine 10 mg and ramipril 10 mg   - recommend continuation of atenolol and amlodipine     #HLD:  Home medication: lipitor 40 mg QHS   - recommend continuing home medication

## 2024-07-12 NOTE — ED ADULT NURSE NOTE - NSFALLRISKINTERV_ED_ALL_ED
Assistance OOB with selected safe patient handling equipment if applicable/Assistance with ambulation/Communicate fall risk and risk factors to all staff, patient, and family/Monitor gait and stability/Provide visual cue: yellow wristband, yellow gown, etc/Reinforce activity limits and safety measures with patient and family/Call bell, personal items and telephone in reach/Instruct patient to call for assistance before getting out of bed/chair/stretcher/Non-slip footwear applied when patient is off stretcher/[place] to call system/Physically safe environment - no spills, clutter or unnecessary equipment/Purposeful Proactive Rounding/Room/bathroom lighting operational, light cord in reach

## 2024-07-12 NOTE — ED ADULT NURSE REASSESSMENT NOTE - NS ED NURSE REASSESS COMMENT FT1
Patient resting in stretcher in no acute distress at this time/ Position adjusted in bed for comfort. Ice packs placed to side of patient for discomfort. Care plan ongoing.

## 2024-07-12 NOTE — ED PROVIDER NOTE - OBJECTIVE STATEMENT
80 yo male with h/o HTN, HLD in the ER after fall at home tonight. Pt states his left leg collapsed and he fell on the floor. Denies head injury or LOC. Pt was not able to get up due to pain . Pt states he managed to get to his phone and call EMS. Deformity noted to left thigh. Pt placed in immobilizer by EMS. left LE appears shortened. Pt denies any other injury

## 2024-07-12 NOTE — H&P ADULT - HISTORY OF PRESENT ILLNESS
HPI  79yMale w/  c/o L thigh pain s/p mechanical fall. Unable to bear weight in the LLE since the fall. Denies headstrike or LOC. Denies numbness/tingling in the LLE. Denies any other trauma/injuries at this time. At baseline, community ambulator w/o assistive devices.               HPI  79yMale w/  c/o L thigh pain s/p fall at home. Unable to bear weight in the LLE since the fall. Denies headstrike or LOC. Denies numbness/tingling in the LLE. Denies any other trauma/injuries at this time. At baseline, community ambulator w/o assistive devices.

## 2024-07-12 NOTE — H&P ADULT - ATTENDING COMMENTS
Patient known to me for primary lumbar/sacroiliac pathology in outpatient setting; presenting with acute L femoral shaft fracture that he states arose from a spontaneous collapsing event of the left lower extremity at home. Reports prodromal pain of the left thigh for at least a month prior to this event. Isolated injury.     He is indicated at this time for intramedullary nail fixation.  Risks, benefits, and alternatives of surgery were reviewed with him and his questions were answered to his satisfaction.  He is agreeable.  Will perform today pending OR availability.  Medical clearance appreciated. Patient known to me for primary lumbar/sacroiliac pathology in outpatient setting; presenting with acute L femoral shaft fracture that he states arose from a spontaneous collapsing event of the left lower extremity at home. Reports prodromal pain of the left thigh for at least a month prior to this event. Isolated injury.     He is indicated at this time for intramedullary nail fixation.  An open biopsy will be performed at the time of surgery to evaluate for any possible neoplastic involvement given the atypical presentation.  Will also perform serum/urine/radiographic analysis for remainder of neoplastic workup (he has known h/o prostate CA s/p brachytherapy).  Risks, benefits, and alternatives of surgery were reviewed with him and his questions were answered to his satisfaction.  He is agreeable.  Will perform today pending OR availability.  Medical clearance appreciated. Patient known to me for primary lumbar/sacroiliac pathology in outpatient setting; presenting with acute L femoral shaft fracture that he states arose from a spontaneous collapsing event of the left lower extremity at home. Reports prodromal pain of the left thigh for at least a month prior to this event. Isolated injury.     He is indicated at this time for intramedullary nail fixation.  An open biopsy will be performed at the time of surgery to evaluate for any possible neoplastic involvement given the atypical presentation.  Will also perform serum/urine/radiographic analysis for remainder of neoplastic workup (he has known h/o prostate CA s/p brachytherapy).  Risks, benefits, and alternatives of surgery were reviewed with him and his questions were answered to his satisfaction.  He is agreeable.  Will perform today pending OR availability.  Medical clearance appreciated.    Addendum 6:25pm:  Neoplastic workup thus far has been negative. There is no clear solid organ tumor in the CT chest/abdomen/pelvis, nor any other apparent bone lesions. Thyroid functions normal; kidney and liver functions unremarkable; mildly elevated CRP but normal ESR indicating low likelihood of chronic osteomyelitis. SPEP/UPEP, PSA still pending. While metastases and multiple myeloma remain at the top of the differential, it is still not possible at this time to conclusively rule out a primary sarcoma. I discussed this with Radiology and Orthopaedic Oncology. I do not think that surgery is advisable at this time while the question of primary vs. metastatic tumor is still not clear. Will postpone surgical intervention for now to follow up the remaining studies and to pursue a whole-body bone scan and left thigh MRI with and without contrast. Patient will be maintained in traction for now with adequate pain medications. Will continue to confer with Ortho Oncology regarding timing of surgery when the workup has progressed.    I discussed all this with the patient as well as daughter and wife; all questions were answered to their satisfaction.

## 2024-07-12 NOTE — H&P ADULT - NSHPPHYSICALEXAM_GEN_ALL_CORE
VITALS  Vital Signs Last 24 Hrs  T(C): 36.8 (11 Jul 2024 23:41), Max: 36.8 (11 Jul 2024 23:41)  T(F): 98.2 (11 Jul 2024 23:41), Max: 98.2 (11 Jul 2024 23:41)  HR: 90 (11 Jul 2024 23:41) (90 - 90)  BP: 138/72 (11 Jul 2024 23:41) (138/72 - 138/72)  BP(mean): --  RR: 18 (11 Jul 2024 23:41) (18 - 18)  SpO2: 97% (11 Jul 2024 23:41) (97% - 97%)    Parameters below as of 11 Jul 2024 23:41  Patient On (Oxygen Delivery Method): room air        PHYSICAL EXAM  Gen: Lying in bed, NAD  Resp: No increased WOB  LLE:  Skin intact, +edema and +ecchymosis over L thigh  +TTP over L thigh, no TTP along remainder of extremity; compartments soft  Limited ROM at hip and knee 2/2 pain  Motor: TA/EHL/GS/FHL intact  Sensory: DP/SP/Tib/Jessica/Saph SILT  +DP pulse, WWP    Secondary survey:  No TTP along spine or other extremities, pelvis grossly stable, SILT and compartments soft throughout

## 2024-07-12 NOTE — H&P ADULT - ASSESSMENT
ASSESSMENT & PLAN  79yMale w/ L femoral shaft fx.  - Plan for OR for operative tx  -NWB LLE, bedrest  -f/u preop: CBC, BMP, coags, T&S x2, CXR, EKG,   -NPO past midnight, IVF  -hold chemical DVT ppx for OR; SCDs OK  -please document medical clearance ASAP for OR  -pain control  -ice/cold compress

## 2024-07-13 LAB
24R-OH-CALCIDIOL SERPL-MCNC: 27.8 NG/ML — LOW (ref 30–80)
24R-OH-CALCIDIOL SERPL-MCNC: 29.2 NG/ML — LOW (ref 30–80)
ANION GAP SERPL CALC-SCNC: 11 MMOL/L — SIGNIFICANT CHANGE UP (ref 5–17)
BUN SERPL-MCNC: 30 MG/DL — HIGH (ref 7–23)
CALCIUM SERPL-MCNC: 9 MG/DL — SIGNIFICANT CHANGE UP (ref 8.4–10.5)
CHLORIDE SERPL-SCNC: 103 MMOL/L — SIGNIFICANT CHANGE UP (ref 96–108)
CO2 SERPL-SCNC: 26 MMOL/L — SIGNIFICANT CHANGE UP (ref 22–31)
CREAT SERPL-MCNC: 0.94 MG/DL — SIGNIFICANT CHANGE UP (ref 0.5–1.3)
CRP SERPL-MCNC: 79.8 MG/L — HIGH (ref 0–4)
EGFR: 81 ML/MIN/1.73M2 — SIGNIFICANT CHANGE UP
ERYTHROCYTE [SEDIMENTATION RATE] IN BLOOD: 8 MM/HR — SIGNIFICANT CHANGE UP
GLUCOSE SERPL-MCNC: 104 MG/DL — HIGH (ref 70–99)
HCT VFR BLD CALC: 45.1 % — SIGNIFICANT CHANGE UP (ref 39–50)
HGB BLD-MCNC: 15.3 G/DL — SIGNIFICANT CHANGE UP (ref 13–17)
MCHC RBC-ENTMCNC: 31.2 PG — SIGNIFICANT CHANGE UP (ref 27–34)
MCHC RBC-ENTMCNC: 33.9 GM/DL — SIGNIFICANT CHANGE UP (ref 32–36)
MCV RBC AUTO: 91.9 FL — SIGNIFICANT CHANGE UP (ref 80–100)
NRBC # BLD: 0 /100 WBCS — SIGNIFICANT CHANGE UP (ref 0–0)
PLATELET # BLD AUTO: 221 K/UL — SIGNIFICANT CHANGE UP (ref 150–400)
POTASSIUM SERPL-MCNC: 4.4 MMOL/L — SIGNIFICANT CHANGE UP (ref 3.5–5.3)
POTASSIUM SERPL-SCNC: 4.4 MMOL/L — SIGNIFICANT CHANGE UP (ref 3.5–5.3)
PSA FLD-MCNC: 0.45 NG/ML — SIGNIFICANT CHANGE UP (ref 0–4)
PTH-INTACT FLD-MCNC: 33 PG/ML — SIGNIFICANT CHANGE UP (ref 15–65)
RBC # BLD: 4.91 M/UL — SIGNIFICANT CHANGE UP (ref 4.2–5.8)
RBC # FLD: 14 % — SIGNIFICANT CHANGE UP (ref 10.3–14.5)
SODIUM SERPL-SCNC: 140 MMOL/L — SIGNIFICANT CHANGE UP (ref 135–145)
WBC # BLD: 12.47 K/UL — HIGH (ref 3.8–10.5)
WBC # FLD AUTO: 12.47 K/UL — HIGH (ref 3.8–10.5)

## 2024-07-13 PROCEDURE — 99232 SBSQ HOSP IP/OBS MODERATE 35: CPT

## 2024-07-13 PROCEDURE — 99233 SBSQ HOSP IP/OBS HIGH 50: CPT

## 2024-07-13 RX ORDER — LORATADINE 10 MG
17 TABLET,DISINTEGRATING ORAL ONCE
Refills: 0 | Status: COMPLETED | OUTPATIENT
Start: 2024-07-13 | End: 2024-07-13

## 2024-07-13 RX ORDER — LORAZEPAM 1 MG/1
0.5 TABLET ORAL ONCE
Refills: 0 | Status: DISCONTINUED | OUTPATIENT
Start: 2024-07-13 | End: 2024-07-13

## 2024-07-13 RX ADMIN — AMLODIPINE BESYLATE 10 MILLIGRAM(S): 2.5 TABLET ORAL at 05:30

## 2024-07-13 RX ADMIN — Medication 0.5 MILLIGRAM(S): at 19:49

## 2024-07-13 RX ADMIN — LORAZEPAM 0.5 MILLIGRAM(S): 1 TABLET ORAL at 21:30

## 2024-07-13 RX ADMIN — Medication 0.5 MILLIGRAM(S): at 13:24

## 2024-07-13 RX ADMIN — Medication 1000 MILLIGRAM(S): at 05:30

## 2024-07-13 RX ADMIN — ATENOLOL 25 MILLIGRAM(S): 100 TABLET ORAL at 05:31

## 2024-07-13 RX ADMIN — Medication 0.5 MILLIGRAM(S): at 13:39

## 2024-07-13 RX ADMIN — OXYCODONE HYDROCHLORIDE 10 MILLIGRAM(S): 30 TABLET ORAL at 17:16

## 2024-07-13 RX ADMIN — Medication 1000 MILLIGRAM(S): at 21:30

## 2024-07-13 RX ADMIN — OXYCODONE HYDROCHLORIDE 10 MILLIGRAM(S): 30 TABLET ORAL at 18:16

## 2024-07-13 RX ADMIN — Medication 1000 MILLIGRAM(S): at 13:24

## 2024-07-13 RX ADMIN — ATORVASTATIN CALCIUM 40 MILLIGRAM(S): 40 TABLET, FILM COATED ORAL at 21:30

## 2024-07-13 RX ADMIN — Medication 1000 MILLIGRAM(S): at 22:30

## 2024-07-13 RX ADMIN — OXYCODONE HYDROCHLORIDE 10 MILLIGRAM(S): 30 TABLET ORAL at 11:39

## 2024-07-13 RX ADMIN — Medication 17 GRAM(S): at 18:19

## 2024-07-13 RX ADMIN — OXYCODONE HYDROCHLORIDE 10 MILLIGRAM(S): 30 TABLET ORAL at 10:39

## 2024-07-13 RX ADMIN — Medication 1000 MILLIGRAM(S): at 06:30

## 2024-07-13 RX ADMIN — CHLORHEXIDINE GLUCONATE 1 APPLICATION(S): 500 CLOTH TOPICAL at 06:30

## 2024-07-13 RX ADMIN — ENOXAPARIN SODIUM 40 MILLIGRAM(S): 120 INJECTION SUBCUTANEOUS at 18:35

## 2024-07-13 RX ADMIN — Medication 0.5 MILLIGRAM(S): at 19:34

## 2024-07-13 NOTE — PROGRESS NOTE ADULT - ASSESSMENT
79M w HTN, HLD, Prostate CA s/p XRT w chronic L leg pain for months p/w fall after L leg gave out on him, found to have proximal femoral shaft fracture, for OR w Dr. Pelayo time TBD    #Pre-op evaluation   - EKG: Normal sinus rhythm   - METS >4   - RCRI: Class I risk   - CONDON: 0.2% Risk of myocardial infarction or cardiac arrest, intraoperatively or up to 30 days post-op    #L Proximal femur fracture   - see below    #HTN - home on atenolol 25mg daily, amlodipine 10mg daily, ramipril 10mg daily  #HLD - home on lipitor 40mg daily  #Leukocytosis - likely reactive; WBC 12 from 15.   #Vitamin D insufficiency - Suboptimal - borderline w 29.9    Plan  Would increase Vitamin D3 to 2000 IU daily  IF BP consistently >140 can resume home ramipril 10mg -- however need to hold ramipril on day of surgery  Discussed results of CT C/A/P w pt and family today. Discussed need to obtain MR of L femur and bone imaging. Discussed high suspicion of neoplastic process but need further imaging to identify type and staging.    Agree pt has good functional status and is low risk for intermediate risk procedure    Co-management will continue to follow  Above d/w orthopedics.

## 2024-07-13 NOTE — PROGRESS NOTE ADULT - SUBJECTIVE AND OBJECTIVE BOX
Ortho Note    Subjective:  Resting comfortably in vasquez’s traction. VSS. CRP 11.8 -> 79.8. Pending bone scan, MRI. Lovenox ordered.  Denies CP, SOB, N/V, numbness/tingling     Vital Signs Last 24 Hrs  T(C): 36.9 (13 Jul 2024 08:22), Max: 37.2 (12 Jul 2024 09:34)  T(F): 98.5 (13 Jul 2024 08:22), Max: 99 (13 Jul 2024 05:21)  HR: 75 (13 Jul 2024 08:22) (62 - 78)  BP: 135/81 (13 Jul 2024 08:22) (121/72 - 144/70)  BP(mean): --  RR: 17 (13 Jul 2024 08:22) (17 - 18)  SpO2: 96% (13 Jul 2024 08:22) (96% - 99%)    Parameters below as of 13 Jul 2024 08:22  Patient On (Oxygen Delivery Method): room air    Objective:    Physical Exam:  General: Pt Alert and oriented, NAD  Pulses: +2 pedal pulses, wwp toes   Sensation:  silt intact , bilateral LE   Motor: Left LLE shortened and  internally rotated with Stokes Traction         Plan of Care:  A/P: 79yMale c/o L thigh pain s/p fall at home, found to have L femoral shaft fracture, high suspicion for pathologic fracture. Pending OR for a Left HIp IMN after workup.  - Pain Control - tylenol 1000mg PO Q8h, Oxycodone 5-10mg PO Q4h prn moderate to severe pain, Dilaudid 0.5mg Q4h prn breakthrough pain   - DVT ppx: LVX due to prolonged immobilization  - PT, WBS: NWB LLE  - appreciate medicine recs and clearance  - Bone scan, MRI L thigh  - Dispo- OR for Left Hip IMN     Ortho Pager [phone]

## 2024-07-13 NOTE — PROGRESS NOTE ADULT - SUBJECTIVE AND OBJECTIVE BOX
INTERVAL HPI/OVERNIGHT EVENTS: sravanthi o/n    SUBJECTIVE: Patient seen and examined at bedside.   Pt's daughter and wife at bedside  Pt reports pain in L leg is controlled. No numbness. Denies any recent fever, chills, sweats. No chest pain, dyspnea. Voiding.     OBJECTIVE:    VITAL SIGNS:  ICU Vital Signs Last 24 Hrs  T(C): 36.9 (13 Jul 2024 08:22), Max: 37.2 (13 Jul 2024 05:21)  T(F): 98.5 (13 Jul 2024 08:22), Max: 99 (13 Jul 2024 05:21)  HR: 75 (13 Jul 2024 08:22) (62 - 78)  BP: 135/81 (13 Jul 2024 08:22) (121/72 - 143/76)  BP(mean): --  ABP: --  ABP(mean): --  RR: 17 (13 Jul 2024 08:22) (17 - 17)  SpO2: 96% (13 Jul 2024 08:22) (96% - 98%)    O2 Parameters below as of 13 Jul 2024 08:22  Patient On (Oxygen Delivery Method): room air              CAPILLARY BLOOD GLUCOSE          PHYSICAL EXAM:  GEN: Male in NAD on RA  HEENT: NC/AT, MMM  CV: RRR, nml S1S2, no murmurs  PULM: nml effort, CTAB  ABD: Soft, non-distended, NABS, non-tender  NEURO  A/O x3, moving all extremities, Sensation intact  L leg in traction  PSYCH: Appropriate      MEDICATIONS:  MEDICATIONS  (STANDING):  acetaminophen     Tablet .. 1000 milliGRAM(s) Oral every 8 hours  amLODIPine   Tablet 10 milliGRAM(s) Oral daily  atenolol  Tablet 25 milliGRAM(s) Oral daily  atorvastatin 40 milliGRAM(s) Oral at bedtime  enoxaparin Injectable 40 milliGRAM(s) SubCutaneous every 24 hours  lactated ringers. 1000 milliLiter(s) (100 mL/Hr) IV Continuous <Continuous>    MEDICATIONS  (PRN):  HYDROmorphone  Injectable 0.5 milliGRAM(s) IV Push every 4 hours PRN breakthrough pain on floor  oxyCODONE    IR 10 milliGRAM(s) Oral every 4 hours PRN Severe Pain (7 - 10)  oxyCODONE    IR 5 milliGRAM(s) Oral every 4 hours PRN Moderate Pain (4 - 6)      ALLERGIES:  Allergies    No Known Allergies    Intolerances        LABS:                        15.3   12.47 )-----------( 221      ( 13 Jul 2024 05:30 )             45.1     07-13    140  |  103  |  30<H>  ----------------------------<  104<H>  4.4   |  26  |  0.94    Ca    9.0      13 Jul 2024 05:30    TPro  6.9  /  Alb  3.9  /  TBili  0.6  /  DBili  x   /  AST  69<H>  /  ALT  23  /  AlkPhos  115  07-12    PT/INR - ( 12 Jul 2024 00:18 )   PT: 11.0 sec;   INR: 0.96          PTT - ( 12 Jul 2024 00:18 )  PTT:29.4 sec  Urinalysis Basic - ( 13 Jul 2024 05:30 )    Color: x / Appearance: x / SG: x / pH: x  Gluc: 104 mg/dL / Ketone: x  / Bili: x / Urobili: x   Blood: x / Protein: x / Nitrite: x   Leuk Esterase: x / RBC: x / WBC x   Sq Epi: x / Non Sq Epi: x / Bacteria: x        RADIOLOGY & ADDITIONAL TESTS: Reviewed.

## 2024-07-14 LAB
ANION GAP SERPL CALC-SCNC: 10 MMOL/L — SIGNIFICANT CHANGE UP (ref 5–17)
BUN SERPL-MCNC: 28 MG/DL — HIGH (ref 7–23)
CALCIUM SERPL-MCNC: 8.9 MG/DL — SIGNIFICANT CHANGE UP (ref 8.4–10.5)
CHLORIDE SERPL-SCNC: 94 MMOL/L — LOW (ref 96–108)
CO2 SERPL-SCNC: 26 MMOL/L — SIGNIFICANT CHANGE UP (ref 22–31)
CREAT SERPL-MCNC: 0.87 MG/DL — SIGNIFICANT CHANGE UP (ref 0.5–1.3)
CREATININE, URINE RESULT: 163 MG/DL — SIGNIFICANT CHANGE UP
EGFR: 87 ML/MIN/1.73M2 — SIGNIFICANT CHANGE UP
GLUCOSE SERPL-MCNC: 106 MG/DL — HIGH (ref 70–99)
HCT VFR BLD CALC: 42.1 % — SIGNIFICANT CHANGE UP (ref 39–50)
HGB BLD-MCNC: 14.8 G/DL — SIGNIFICANT CHANGE UP (ref 13–17)
MCHC RBC-ENTMCNC: 31.4 PG — SIGNIFICANT CHANGE UP (ref 27–34)
MCHC RBC-ENTMCNC: 35.2 GM/DL — SIGNIFICANT CHANGE UP (ref 32–36)
MCV RBC AUTO: 89.4 FL — SIGNIFICANT CHANGE UP (ref 80–100)
NRBC # BLD: 0 /100 WBCS — SIGNIFICANT CHANGE UP (ref 0–0)
PLATELET # BLD AUTO: 216 K/UL — SIGNIFICANT CHANGE UP (ref 150–400)
POTASSIUM SERPL-MCNC: 4.2 MMOL/L — SIGNIFICANT CHANGE UP (ref 3.5–5.3)
POTASSIUM SERPL-SCNC: 4.2 MMOL/L — SIGNIFICANT CHANGE UP (ref 3.5–5.3)
PROT ?TM UR-MCNC: 95 MG/DL — HIGH (ref 0–12)
PROT SERPL-MCNC: 6.6 G/DL — SIGNIFICANT CHANGE UP (ref 6–8.3)
RBC # BLD: 4.71 M/UL — SIGNIFICANT CHANGE UP (ref 4.2–5.8)
RBC # FLD: 14 % — SIGNIFICANT CHANGE UP (ref 10.3–14.5)
SODIUM SERPL-SCNC: 130 MMOL/L — LOW (ref 135–145)
WBC # BLD: 12.94 K/UL — HIGH (ref 3.8–10.5)
WBC # FLD AUTO: 12.94 K/UL — HIGH (ref 3.8–10.5)

## 2024-07-14 PROCEDURE — 99233 SBSQ HOSP IP/OBS HIGH 50: CPT

## 2024-07-14 PROCEDURE — 99232 SBSQ HOSP IP/OBS MODERATE 35: CPT

## 2024-07-14 PROCEDURE — 73720 MRI LWR EXTREMITY W/O&W/DYE: CPT | Mod: 26,LT

## 2024-07-14 RX ORDER — ALPRAZOLAM 0.5 MG
0.5 TABLET ORAL DAILY
Refills: 0 | Status: DISCONTINUED | OUTPATIENT
Start: 2024-07-14 | End: 2024-07-19

## 2024-07-14 RX ADMIN — ENOXAPARIN SODIUM 40 MILLIGRAM(S): 120 INJECTION SUBCUTANEOUS at 18:45

## 2024-07-14 RX ADMIN — OXYCODONE HYDROCHLORIDE 10 MILLIGRAM(S): 30 TABLET ORAL at 02:57

## 2024-07-14 RX ADMIN — OXYCODONE HYDROCHLORIDE 10 MILLIGRAM(S): 30 TABLET ORAL at 18:46

## 2024-07-14 RX ADMIN — ATORVASTATIN CALCIUM 40 MILLIGRAM(S): 40 TABLET, FILM COATED ORAL at 21:05

## 2024-07-14 RX ADMIN — OXYCODONE HYDROCHLORIDE 10 MILLIGRAM(S): 30 TABLET ORAL at 14:57

## 2024-07-14 RX ADMIN — Medication 0.5 MILLIGRAM(S): at 20:39

## 2024-07-14 RX ADMIN — AMLODIPINE BESYLATE 10 MILLIGRAM(S): 2.5 TABLET ORAL at 05:07

## 2024-07-14 RX ADMIN — Medication 0.5 MILLIGRAM(S): at 12:01

## 2024-07-14 RX ADMIN — Medication 1000 MILLIGRAM(S): at 21:04

## 2024-07-14 RX ADMIN — OXYCODONE HYDROCHLORIDE 10 MILLIGRAM(S): 30 TABLET ORAL at 03:57

## 2024-07-14 RX ADMIN — Medication 0.5 MILLIGRAM(S): at 12:11

## 2024-07-14 RX ADMIN — Medication 1000 MILLIGRAM(S): at 14:57

## 2024-07-14 RX ADMIN — OXYCODONE HYDROCHLORIDE 10 MILLIGRAM(S): 30 TABLET ORAL at 08:20

## 2024-07-14 RX ADMIN — OXYCODONE HYDROCHLORIDE 10 MILLIGRAM(S): 30 TABLET ORAL at 09:20

## 2024-07-14 RX ADMIN — OXYCODONE HYDROCHLORIDE 10 MILLIGRAM(S): 30 TABLET ORAL at 15:57

## 2024-07-14 RX ADMIN — Medication 2000 UNIT(S): at 14:58

## 2024-07-14 RX ADMIN — Medication 1000 MILLIGRAM(S): at 06:07

## 2024-07-14 RX ADMIN — Medication 1000 MILLIGRAM(S): at 05:07

## 2024-07-14 RX ADMIN — OXYCODONE HYDROCHLORIDE 10 MILLIGRAM(S): 30 TABLET ORAL at 19:46

## 2024-07-14 RX ADMIN — ATENOLOL 25 MILLIGRAM(S): 100 TABLET ORAL at 05:07

## 2024-07-14 NOTE — PROGRESS NOTE ADULT - SUBJECTIVE AND OBJECTIVE BOX
INTERVAL HPI/OVERNIGHT EVENTS: sravanthi o/n    SUBJECTIVE: Patient seen and examined at bedside.   Pt feels anxious about possible malignancy diagnosis and awaiting further testing. reports no pain in L leg or numbness. Eating wo LH/dizziness, chest pain, dyspnea, N/V/Abd pain.     OBJECTIVE:    VITAL SIGNS:  ICU Vital Signs Last 24 Hrs  T(C): 36.9 (14 Jul 2024 08:18), Max: 37.2 (13 Jul 2024 20:09)  T(F): 98.5 (14 Jul 2024 08:18), Max: 99 (13 Jul 2024 20:09)  HR: 81 (14 Jul 2024 08:18) (72 - 92)  BP: 146/80 (14 Jul 2024 08:18) (125/75 - 150/84)  BP(mean): --  ABP: --  ABP(mean): --  RR: 17 (14 Jul 2024 08:18) (16 - 17)  SpO2: 95% (14 Jul 2024 08:18) (95% - 98%)    O2 Parameters below as of 14 Jul 2024 08:18  Patient On (Oxygen Delivery Method): room air              07-13 @ 07:01  -  07-14 @ 07:00  --------------------------------------------------------  IN: 0 mL / OUT: 365 mL / NET: -365 mL    07-14 @ 07:01 - 07-14 @ 13:38  --------------------------------------------------------  IN: 240 mL / OUT: 0 mL / NET: 240 mL      CAPILLARY BLOOD GLUCOSE          PHYSICAL EXAM:  GEN: Male in NAD on RA  HEENT: NC/AT, MMM  CV: RRR, nml S1S2, no murmurs  PULM: nml effort, CTAB  ABD: Soft, non-distended, NABS, non-tender  NEURO  A/O x3, moving all extremities, Sensation intact  L leg in traction  PSYCH: Appropriate    MEDICATIONS:  MEDICATIONS  (STANDING):  acetaminophen     Tablet .. 1000 milliGRAM(s) Oral every 8 hours  amLODIPine   Tablet 10 milliGRAM(s) Oral daily  atenolol  Tablet 25 milliGRAM(s) Oral daily  atorvastatin 40 milliGRAM(s) Oral at bedtime  cholecalciferol 2000 Unit(s) Oral daily  enoxaparin Injectable 40 milliGRAM(s) SubCutaneous every 24 hours    MEDICATIONS  (PRN):  HYDROmorphone  Injectable 0.5 milliGRAM(s) IV Push every 4 hours PRN breakthrough pain on floor  oxyCODONE    IR 10 milliGRAM(s) Oral every 4 hours PRN Severe Pain (7 - 10)  oxyCODONE    IR 5 milliGRAM(s) Oral every 4 hours PRN Moderate Pain (4 - 6)      ALLERGIES:  Allergies    No Known Allergies    Intolerances        LABS:                        14.8   12.94 )-----------( 216      ( 14 Jul 2024 05:30 )             42.1     07-14    130<L>  |  94<L>  |  28<H>  ----------------------------<  106<H>  4.2   |  26  |  0.87    Ca    8.9      14 Jul 2024 05:30        Urinalysis Basic - ( 14 Jul 2024 05:30 )    Color: x / Appearance: x / SG: x / pH: x  Gluc: 106 mg/dL / Ketone: x  / Bili: x / Urobili: x   Blood: x / Protein: x / Nitrite: x   Leuk Esterase: x / RBC: x / WBC x   Sq Epi: x / Non Sq Epi: x / Bacteria: x        RADIOLOGY & ADDITIONAL TESTS: Reviewed.

## 2024-07-14 NOTE — PROGRESS NOTE ADULT - SUBJECTIVE AND OBJECTIVE BOX
Ortho Note    Subjective:  Resting comfortably in vasquez’s traction. VSS. SPEP/UPEP in lab but results still pending. Pending MRI this morning.  Denies CP, SOB, N/V, numbness/tingling     Vital Signs Last 24 Hrs  T(C): 36.9 (14 Jul 2024 08:18), Max: 37.2 (13 Jul 2024 20:09)  T(F): 98.5 (14 Jul 2024 08:18), Max: 99 (13 Jul 2024 20:09)  HR: 81 (14 Jul 2024 08:18) (72 - 92)  BP: 146/80 (14 Jul 2024 08:18) (125/75 - 150/84)  BP(mean): --  RR: 17 (14 Jul 2024 08:18) (16 - 17)  SpO2: 95% (14 Jul 2024 08:18) (95% - 98%)    Parameters below as of 14 Jul 2024 08:18  Patient On (Oxygen Delivery Method): room air    Objective:  Physical Exam:  General: Pt Alert and oriented, NAD  Pulses: +2 pedal pulses, wwp toes   Sensation:  silt intact , bilateral LE   Motor: Left LLE shortened and  internally rotated with Park Traction     Plan of Care:  A/P: 79yMale c/o L thigh pain s/p fall at home, found to have L femoral shaft fracture, high suspicion for pathologic fracture. Pending OR for a Left HIp IMN after workup.  - Pain Control - tylenol 1000mg PO Q8h, Oxycodone 5-10mg PO Q4h prn moderate to severe pain, Dilaudid 0.5mg Q4h prn breakthrough pain   - DVT ppx: LVX due to prolonged immobilization  - PT, WBS: NWB LLE  - appreciate medicine recs and clearance  - Bone scan, MRI L thigh  - Dispo- OR for Left Hip IMN     Ortho Pager [phone]

## 2024-07-14 NOTE — PROGRESS NOTE ADULT - ASSESSMENT
79M w HTN, HLD, Prostate CA s/p XRT w chronic L leg pain for months p/w fall after L leg gave out on him, found to have proximal femoral shaft fracture, for OR w Dr. Pelayo time TBD    #Pre-op evaluation   - EKG: Normal sinus rhythm   - METS >4   - RCRI: Class I risk   - CONDON: 0.2% Risk of myocardial infarction or cardiac arrest, intraoperatively or up to 30 days post-op    #L Proximal femur fracture   - see below    #HTN - home on atenolol 25mg daily, amlodipine 10mg daily, ramipril 10mg daily  #HLD - home on lipitor 40mg daily  #Leukocytosis - likely reactive; WBC 12 from 12.   #Vitamin D insufficiency - Suboptimal - borderline w 29.9   - increased supplementation to 2000 IU daily D3  #Hyponatremia - possibly d/t continuous LR    - LR stopped    Plan  If AM BMP > 130 - would obtain Urine osmoles and random urine sodium.   IF BP consistently >140 can resume home ramipril 10mg -- however need to hold ramipril on day of surgery    Discussed results of CT C/A/P w pt and family today. Discussed need to obtain MR of L femur and bone imaging. Discussed high suspicion of neoplastic process but need further imaging to identify type and staging.  --f/u SPEP, UPEP  --Added PO xanax 0.5mg daily PRN for anxiety    Agree pt has good functional status and is low risk for intermediate risk procedure    Co-management will continue to follow  Above d/w orthopedics, Dr. Pelayo

## 2024-07-15 ENCOUNTER — TRANSCRIPTION ENCOUNTER (OUTPATIENT)
Age: 79
End: 2024-07-15

## 2024-07-15 DIAGNOSIS — M84.552A PATHOLOGICAL FRACTURE IN NEOPLASTIC DISEASE, LEFT FEMUR, INITIAL ENCOUNTER FOR FRACTURE: ICD-10-CM

## 2024-07-15 LAB
ANION GAP SERPL CALC-SCNC: 8 MMOL/L — SIGNIFICANT CHANGE UP (ref 5–17)
BUN SERPL-MCNC: 23 MG/DL — SIGNIFICANT CHANGE UP (ref 7–23)
CALCIUM SERPL-MCNC: 8.7 MG/DL — SIGNIFICANT CHANGE UP (ref 8.4–10.5)
CHLORIDE SERPL-SCNC: 96 MMOL/L — SIGNIFICANT CHANGE UP (ref 96–108)
CO2 SERPL-SCNC: 26 MMOL/L — SIGNIFICANT CHANGE UP (ref 22–31)
CREAT ?TM UR-MCNC: 86 MG/DL — SIGNIFICANT CHANGE UP
CREAT SERPL-MCNC: 1.05 MG/DL — SIGNIFICANT CHANGE UP (ref 0.5–1.3)
EGFR: 71 ML/MIN/1.73M2 — SIGNIFICANT CHANGE UP
GLUCOSE SERPL-MCNC: 111 MG/DL — HIGH (ref 70–99)
HCT VFR BLD CALC: 41.3 % — SIGNIFICANT CHANGE UP (ref 39–50)
HGB BLD-MCNC: 14.2 G/DL — SIGNIFICANT CHANGE UP (ref 13–17)
LDH SERPL L TO P-CCNC: 620 U/L — HIGH (ref 50–242)
MCHC RBC-ENTMCNC: 31.1 PG — SIGNIFICANT CHANGE UP (ref 27–34)
MCHC RBC-ENTMCNC: 34.4 GM/DL — SIGNIFICANT CHANGE UP (ref 32–36)
MCV RBC AUTO: 90.6 FL — SIGNIFICANT CHANGE UP (ref 80–100)
NRBC # BLD: 0 /100 WBCS — SIGNIFICANT CHANGE UP (ref 0–0)
OSMOLALITY UR: 485 MOSM/KG — SIGNIFICANT CHANGE UP (ref 300–900)
PLATELET # BLD AUTO: 268 K/UL — SIGNIFICANT CHANGE UP (ref 150–400)
POTASSIUM SERPL-MCNC: 4.8 MMOL/L — SIGNIFICANT CHANGE UP (ref 3.5–5.3)
POTASSIUM SERPL-SCNC: 4.8 MMOL/L — SIGNIFICANT CHANGE UP (ref 3.5–5.3)
RBC # BLD: 4.56 M/UL — SIGNIFICANT CHANGE UP (ref 4.2–5.8)
RBC # FLD: 13.8 % — SIGNIFICANT CHANGE UP (ref 10.3–14.5)
SODIUM SERPL-SCNC: 130 MMOL/L — LOW (ref 135–145)
SODIUM UR-SCNC: <20 MMOL/L — SIGNIFICANT CHANGE UP
WBC # BLD: 11.56 K/UL — HIGH (ref 3.8–10.5)
WBC # FLD AUTO: 11.56 K/UL — HIGH (ref 3.8–10.5)

## 2024-07-15 PROCEDURE — 99232 SBSQ HOSP IP/OBS MODERATE 35: CPT

## 2024-07-15 PROCEDURE — 78306 BONE IMAGING WHOLE BODY: CPT | Mod: 26

## 2024-07-15 PROCEDURE — 99233 SBSQ HOSP IP/OBS HIGH 50: CPT

## 2024-07-15 RX ORDER — DEXTROSE MONOHYDRATE, SODIUM CHLORIDE, SODIUM LACTATE, CALCIUM CHLORIDE, MAGNESIUM CHLORIDE 1.5; 538; 448; 18.4; 5.08 G/100ML; MG/100ML; MG/100ML; MG/100ML; MG/100ML
1000 SOLUTION INTRAPERITONEAL
Refills: 0 | Status: DISCONTINUED | OUTPATIENT
Start: 2024-07-15 | End: 2024-07-16

## 2024-07-15 RX ORDER — ASPIRIN 325 MG
5 TABLET ORAL EVERY 12 HOURS
Refills: 0 | Status: DISCONTINUED | OUTPATIENT
Start: 2024-07-15 | End: 2024-07-29

## 2024-07-15 RX ORDER — BACTERIOSTATIC SODIUM CHLORIDE 0.9 %
500 VIAL (ML) INJECTION ONCE
Refills: 0 | Status: COMPLETED | OUTPATIENT
Start: 2024-07-15 | End: 2024-07-15

## 2024-07-15 RX ADMIN — Medication 0.5 MILLIGRAM(S): at 21:46

## 2024-07-15 RX ADMIN — Medication 0.5 MILLIGRAM(S): at 12:12

## 2024-07-15 RX ADMIN — OXYCODONE HYDROCHLORIDE 10 MILLIGRAM(S): 30 TABLET ORAL at 03:51

## 2024-07-15 RX ADMIN — Medication 0.5 MILLIGRAM(S): at 17:38

## 2024-07-15 RX ADMIN — OXYCODONE HYDROCHLORIDE 10 MILLIGRAM(S): 30 TABLET ORAL at 21:47

## 2024-07-15 RX ADMIN — Medication 0.5 MILLIGRAM(S): at 07:14

## 2024-07-15 RX ADMIN — Medication 0.5 MILLIGRAM(S): at 06:14

## 2024-07-15 RX ADMIN — ENOXAPARIN SODIUM 40 MILLIGRAM(S): 120 INJECTION SUBCUTANEOUS at 18:33

## 2024-07-15 RX ADMIN — Medication 2000 UNIT(S): at 13:48

## 2024-07-15 RX ADMIN — Medication 1000 MILLIGRAM(S): at 21:54

## 2024-07-15 RX ADMIN — AMLODIPINE BESYLATE 10 MILLIGRAM(S): 2.5 TABLET ORAL at 05:57

## 2024-07-15 RX ADMIN — OXYCODONE HYDROCHLORIDE 10 MILLIGRAM(S): 30 TABLET ORAL at 14:49

## 2024-07-15 RX ADMIN — Medication 1000 MILLIGRAM(S): at 13:48

## 2024-07-15 RX ADMIN — OXYCODONE HYDROCHLORIDE 10 MILLIGRAM(S): 30 TABLET ORAL at 10:21

## 2024-07-15 RX ADMIN — ATENOLOL 25 MILLIGRAM(S): 100 TABLET ORAL at 05:57

## 2024-07-15 RX ADMIN — OXYCODONE HYDROCHLORIDE 10 MILLIGRAM(S): 30 TABLET ORAL at 02:51

## 2024-07-15 RX ADMIN — OXYCODONE HYDROCHLORIDE 10 MILLIGRAM(S): 30 TABLET ORAL at 20:47

## 2024-07-15 RX ADMIN — Medication 1000 MILLIGRAM(S): at 05:57

## 2024-07-15 RX ADMIN — Medication 5 MILLIGRAM(S): at 18:33

## 2024-07-15 RX ADMIN — Medication 0.5 MILLIGRAM(S): at 17:23

## 2024-07-15 RX ADMIN — Medication 0.5 MILLIGRAM(S): at 11:57

## 2024-07-15 RX ADMIN — OXYCODONE HYDROCHLORIDE 10 MILLIGRAM(S): 30 TABLET ORAL at 09:21

## 2024-07-15 RX ADMIN — ATORVASTATIN CALCIUM 40 MILLIGRAM(S): 40 TABLET, FILM COATED ORAL at 21:53

## 2024-07-15 RX ADMIN — Medication 250 MILLILITER(S): at 17:24

## 2024-07-15 RX ADMIN — OXYCODONE HYDROCHLORIDE 10 MILLIGRAM(S): 30 TABLET ORAL at 13:49

## 2024-07-15 RX ADMIN — Medication 0.5 MILLIGRAM(S): at 22:46

## 2024-07-15 NOTE — DIETITIAN INITIAL EVALUATION ADULT - PERSON TAUGHT/METHOD
Pt amenable to education; RD provided education in regards to the importance of adequate macro and micronutrients, as well as hydration to support ADLs, maintain energy levels and overall functional/nutritional status. General healthful education provided. Nutrient-dense foods promoted. Small, frequent meals promoted when appetite is low. Nourishments and/or oral nutrition supplements discussed with pt. Pt was amenable to trialing these (Mojgan Aguero oral nutrition supplements), RD spoke to medical team. Overall, pt was receptive and verbalized understanding./verbal instruction/patient instructed/spouse instructed/daughter instructed/friend instructed

## 2024-07-15 NOTE — PROGRESS NOTE ADULT - ASSESSMENT
79M w HTN, HLD, Prostate CA s/p XRT w chronic L leg pain for months p/w fall after L leg gave out on him, found to have proximal femoral shaft fracture, for OR w Dr. Pelayo time TBD    #Pre-op evaluation   - EKG: Normal sinus rhythm   - METS >4   - RCRI: Class I risk   - CONDON: 0.2% Risk of myocardial infarction or cardiac arrest, intraoperatively or up to 30 days post-op    #L Proximal femur fracture   - CT C/A/P wo evidence of metastatic disease. Enlarged prostate wo fiducials   - MR L femur: pathologic fracture w displacement - some soft tissue present suggestive of mass w permeative marrow changes extending to lesser trochanter and mid-distal 3rd of femoral diaphysis.     #HTN - home on atenolol 25mg daily, amlodipine 10mg daily, ramipril 10mg daily  #HLD - home on lipitor 40mg daily  #Leukocytosis - likely reactive; WBC 11 from 12 from 12.   #Vitamin D insufficiency - Suboptimal - borderline w 29.9   - increased supplementation to 2000 IU daily D3  #Hyponatremia - 130 today.     Plan  Josephine indicates pre-renal. Would encourage increased PO/solute intake. Can give 500cc NS over 2 hours  F/U Bone scan today.   --f/u SPEP, UPEP    Agree pt has good functional status and is low risk for intermediate risk procedure    Co-management will continue to follow  Above d/w orthopedics

## 2024-07-15 NOTE — DIETITIAN INITIAL EVALUATION ADULT - ADD RECOMMEND
1. Continue with current diet order (regular diet)  2. Encourage pt to meet nutritional needs as able  3. Monitor PO intakes, trend weights (weekly), monitor skin integrity, monitor labs (electrolytes, CMP), monitor GI function  4. Encourage adherence to diet education (reinforce as able)  5. Continue vitamin D supplementation   6. Pain and bowel regimen per team  7. Will continue to assess/honor preferences as able   8. Align nutrition interventions with goals of care at all times

## 2024-07-15 NOTE — DIETITIAN INITIAL EVALUATION ADULT - PERTINENT MEDS FT
MEDICATIONS  (STANDING):  acetaminophen     Tablet .. 1000 milliGRAM(s) Oral every 8 hours  amLODIPine   Tablet 10 milliGRAM(s) Oral daily  atenolol  Tablet 25 milliGRAM(s) Oral daily  atorvastatin 40 milliGRAM(s) Oral at bedtime  cholecalciferol 2000 Unit(s) Oral daily  enoxaparin Injectable 40 milliGRAM(s) SubCutaneous every 24 hours    MEDICATIONS  (PRN):  ALPRAZolam 0.5 milliGRAM(s) Oral daily PRN anxiety  HYDROmorphone  Injectable 0.5 milliGRAM(s) IV Push every 4 hours PRN breakthrough pain on floor  oxyCODONE    IR 10 milliGRAM(s) Oral every 4 hours PRN Severe Pain (7 - 10)  oxyCODONE    IR 5 milliGRAM(s) Oral every 4 hours PRN Moderate Pain (4 - 6)

## 2024-07-15 NOTE — DIETITIAN INITIAL EVALUATION ADULT - OTHER CALCULATIONS
Pt is % ideal body weight, thus actual body weight used for all calculations. Needs adjusted for advanced age, clinical condition, potential preop status (per medical team). Spine appears normal, range of motion is not limited, no muscle or joint tenderness

## 2024-07-15 NOTE — DIETITIAN INITIAL EVALUATION ADULT - PERTINENT LABORATORY DATA
07-15    130<L>  |  96  |  23  ----------------------------<  111<H>  4.8   |  26  |  1.05    Ca    8.7      15 Jul 2024 08:25

## 2024-07-15 NOTE — PROGRESS NOTE ADULT - SUBJECTIVE AND OBJECTIVE BOX
INTERVAL HPI/OVERNIGHT EVENTS: sravanthi o/n    SUBJECTIVE: Patient seen and examined at bedside.   Reports some increased pain in L leg that was relieved w PRN pain medications. Denies any numbness. No fever, chills, sweats. No chest pain, dyspnea  Reports poor diet - has some anxiety contributing to this. Denies any N/V/abd pain. No BM but passing flatus. Condom cath in place.     OBJECTIVE:    VITAL SIGNS:  ICU Vital Signs Last 24 Hrs  T(C): 36.9 (15 Jul 2024 13:47), Max: 37.6 (14 Jul 2024 20:31)  T(F): 98.4 (15 Jul 2024 13:47), Max: 99.6 (14 Jul 2024 20:31)  HR: 66 (15 Jul 2024 13:47) (63 - 96)  BP: 136/73 (15 Jul 2024 13:47) (125/76 - 144/80)  BP(mean): --  ABP: --  ABP(mean): --  RR: 17 (15 Jul 2024 09:11) (17 - 17)  SpO2: 97% (15 Jul 2024 09:11) (95% - 97%)    O2 Parameters below as of 15 Jul 2024 09:11  Patient On (Oxygen Delivery Method): room air              07-14 @ 07:01  -  07-15 @ 07:00  --------------------------------------------------------  IN: 240 mL / OUT: 0 mL / NET: 240 mL      CAPILLARY BLOOD GLUCOSE          PHYSICAL EXAM:  GEN: Male in NAD on RA  HEENT: NC/AT, MMM  CV: RRR, nml S1S2, no murmurs  PULM: nml effort, CTAB  ABD: Soft, non-distended, NABS, non-tender  NEURO  A/O x3, moving all extremities, Sensation intact  L leg in traction  PSYCH: Appropriate    MEDICATIONS:  MEDICATIONS  (STANDING):  acetaminophen     Tablet .. 1000 milliGRAM(s) Oral every 8 hours  amLODIPine   Tablet 10 milliGRAM(s) Oral daily  atenolol  Tablet 25 milliGRAM(s) Oral daily  atorvastatin 40 milliGRAM(s) Oral at bedtime  cholecalciferol 2000 Unit(s) Oral daily  enoxaparin Injectable 40 milliGRAM(s) SubCutaneous every 24 hours  sodium chloride 0.9% Bolus 500 milliLiter(s) IV Bolus once    MEDICATIONS  (PRN):  ALPRAZolam 0.5 milliGRAM(s) Oral daily PRN anxiety  HYDROmorphone  Injectable 0.5 milliGRAM(s) IV Push every 4 hours PRN breakthrough pain on floor  oxyCODONE    IR 10 milliGRAM(s) Oral every 4 hours PRN Severe Pain (7 - 10)  oxyCODONE    IR 5 milliGRAM(s) Oral every 4 hours PRN Moderate Pain (4 - 6)      ALLERGIES:  Allergies    No Known Allergies    Intolerances        LABS:                        14.2   11.56 )-----------( 268      ( 15 Jul 2024 08:25 )             41.3     07-15    130<L>  |  96  |  23  ----------------------------<  111<H>  4.8   |  26  |  1.05    Ca    8.7      15 Jul 2024 08:25        Urinalysis Basic - ( 15 Jul 2024 08:25 )    Color: x / Appearance: x / SG: x / pH: x  Gluc: 111 mg/dL / Ketone: x  / Bili: x / Urobili: x   Blood: x / Protein: x / Nitrite: x   Leuk Esterase: x / RBC: x / WBC x   Sq Epi: x / Non Sq Epi: x / Bacteria: x        RADIOLOGY & ADDITIONAL TESTS: Reviewed.

## 2024-07-15 NOTE — PROGRESS NOTE ADULT - SUBJECTIVE AND OBJECTIVE BOX
Ortho Note    Subjective:  Resting comfortably in vasquez’s traction. VSS.  Denies CP, SOB, N/V, numbness/tingling     Vital Signs Last 24 Hrs  T(C): 36.3 (15 Jul 2024 05:30), Max: 37.6 (14 Jul 2024 20:31)  T(F): 97.4 (15 Jul 2024 05:30), Max: 99.6 (14 Jul 2024 20:31)  HR: 80 (15 Jul 2024 05:30) (80 - 96)  BP: 139/77 (15 Jul 2024 05:30) (125/76 - 146/80)  BP(mean): --  RR: 17 (15 Jul 2024 05:30) (17 - 17)  SpO2: 95% (15 Jul 2024 05:30) (95% - 97%)    Parameters below as of 15 Jul 2024 05:30  Patient On (Oxygen Delivery Method): room air        Objective:  Physical Exam:  General: Pt Alert and oriented, NAD  Pulses: +2 pedal pulses, wwp toes   Sensation:  silt intact , bilateral LE   Motor: LLE in [place] Traction, able to wiggle toes       LABS:                          14.8   12.94 )-----------( 216      ( 14 Jul 2024 05:30 )             42.1     07-14    130<L>  |  94<L>  |  28<H>  ----------------------------<  106<H>  4.2   |  26  |  0.87    Ca    8.9      14 Jul 2024 05:30          Urinalysis Basic - ( 14 Jul 2024 05:30 )    Color: x / Appearance: x / SG: x / pH: x  Gluc: 106 mg/dL / Ketone: x  / Bili: x / Urobili: x   Blood: x / Protein: x / Nitrite: x   Leuk Esterase: x / RBC: x / WBC x   Sq Epi: x / Non Sq Epi: x / Bacteria: x      Serum protein 6.6 total    MRI L hip:   Soft tissue lesion surrounding fracture site and extending up femur    Plan of Care:  A/P: 79yMale c/o L thigh pain s/p fall at home, found to have pathologic femoral shaft fracture. Pending OR for a Left HIp IMN  - Pain Control - tylenol 1000mg PO Q8h, Oxycodone 5-10mg PO Q4h prn moderate to severe pain, Dilaudid 0.5mg Q4h prn breakthrough pain   - DVT ppx: LVX due to prolonged immobilization  - PT, WBS: ROMAN LEA  - appreciate medicine recs and clearance  - pending Bone scan  - Dispo- OR for Left Hip IMN     Ortho Pager [phone]

## 2024-07-15 NOTE — PROGRESS NOTE ADULT - SUBJECTIVE AND OBJECTIVE BOX
Ortho Note    Subjective:  Pt seen and examined today   Patient reports LLE pain with movement, otherwise pain controlled with current pain medication regimen   Denies CP, SOB, N/V, numbness/tingling   Reviewed plan of care with patient and bedside     Vital Signs Last 24 Hrs  T(C): 36.9 (07-15-24 @ 13:47), Max: 36.9 (07-15-24 @ 13:47)  T(F): 98.4 (07-15-24 @ 13:47), Max: 98.4 (07-15-24 @ 13:47)  HR: 66 (07-15-24 @ 13:47) (66 - 66)  BP: 136/73 (07-15-24 @ 13:47) (136/73 - 136/73)  BP(mean): --  RR: --  SpO2: --  AVSS    Objective:    Physical Exam:  General: Pt Alert and oriented, NAD  Pulses: +2 DP pulses,   Sensation: silt intact bilateral LE  Motor: EHL/FHL/TA/GS- LLE in bucks traction- skin intact able to wiggle toes  Shannon traction remove and replaced for bone scan 7-15, skin intact,   condom cath in placed       Plan of Care:  A/P: 79M w HTN, HLD, Prostate CA s/p XRT w chronic L leg pain for months p/w fall after L leg gave out on him, found to have proximal femoral shaft fracture, for OR w Dr. Pelayo time TBD  - afebrile, wbcs 11.56m  - Pain Control- tylenol 1000mg PO Q8h, Dilaudid 0.5mg Q4h prn breakthrough pain, Oxycodone 5-10mg PO Q4h prn moderate to severe pain   - DVT ppx: Lovenox 40mg SUbq Q24h   - PT, WBS: NWB LLE in bucks traction   - - hyponatremia, urine lytes ordered,  cc bolus over 2 hours given   - bone scan today- 7-15   - notified PCP Dr. Gabriel Goldberg patients pcp of his admission , reviewed course of care   - appreciate medicine recs  - bowel regimen,   - nutrition consulted for poor po intake, added ensure and [employer] protein shakes   - F/u Spep, Upep - called central lab results pending   - Dispo- or  pending medical clearance     Ortho Pager [phone]

## 2024-07-15 NOTE — DIETITIAN INITIAL EVALUATION ADULT - OTHER INFO
This is a 79 year old Male with complaints of left thigh pain status post fall at home. Unable to bear weight in the LLE since the fall. Denies head strike or loss of consciousness. Denies numbness/tingling in the LLE. Denies any other trauma/injuries at this time. At baseline, community ambulator without assistive devices.    Pt seen in room for nutrition assessment. Pt's daughter, spouse and friend were present at bedside. Pt reports fair appetite PTA and low appetite currently/during hospital stay. As per diet recall PTA: pt stated he eats various foods, pt and pt's wife cook, eats meat/fish/legumes/cheese/dairy, whole grains, starches, fruits, vegetables, healthy fat sources, "fun foods", however pt can be picky at times his wife says. Currently on regular diet, tolerating poorly, noted with </50% PO intakes overall. No cultural, Catholic, or ethnic food preferences noted. No known food allergies. No noted significant wt changes, reports wt stability at current wt. Dosing wt: ~130 pounds, Ideal body weight: 142 pounds, pt is ~92% of ideal body weight. Denies nausea, vomiting, diarrhea, noted with some constipation, last BM on 7/14/24, pt stated he has not been moving his bowels regularly, and has not been moving around, and these factors have affected his appetite. Noted with left hip and left leg pitting 4+ edema. Skin: bruised (ecchymosis), no incisions or pressure ulcers noted. Oni: 15. No issues chewing or swallowing noted. Noted with severe level (8) of pain. Labs reviewed: low sodium (130), elevated serum Glucose (111); RD to continue to monitor trends. Nutritionally pertinent medications/supplements: cholecalciferol. Observed pt with no overt signs of muscle or fat wasting. Based on ASPEN guidelines, pt does not meet criteria for malnutrition at this time. Pt amenable to education; RD provided education in regards to the importance of adequate macro and micronutrients, as well as hydration to support ADLs, maintain energy levels and overall functional/nutritional status. General healthful education provided. Nutrient-dense foods promoted. Small, frequent meals promoted when appetite is low. Nourishments and/or oral nutrition supplements discussed with pt. Pt was amenable to trialing these (Ensure, Zafu oral nutrition supplements), RD spoke to medical team. Overall, pt was receptive and verbalized understanding. No additional nutrition-related concerns. Will continue to follow. Additional nutrition recommendations below to follow.

## 2024-07-16 ENCOUNTER — RESULT REVIEW (OUTPATIENT)
Age: 79
End: 2024-07-16

## 2024-07-16 LAB
% GAMMA, URINE: 6.2 % — SIGNIFICANT CHANGE UP
ALBUMIN 24H MFR UR ELPH: 38.6 % — SIGNIFICANT CHANGE UP
ALPHA1 GLOB 24H MFR UR ELPH: 30.5 % — SIGNIFICANT CHANGE UP
ALPHA2 GLOB 24H MFR UR ELPH: 12.9 % — SIGNIFICANT CHANGE UP
ANION GAP SERPL CALC-SCNC: 11 MMOL/L — SIGNIFICANT CHANGE UP (ref 5–17)
ANION GAP SERPL CALC-SCNC: 12 MMOL/L — SIGNIFICANT CHANGE UP (ref 5–17)
B-GLOBULIN 24H MFR UR ELPH: 11.8 % — SIGNIFICANT CHANGE UP
BLD GP AB SCN SERPL QL: NEGATIVE — SIGNIFICANT CHANGE UP
BUN SERPL-MCNC: 25 MG/DL — HIGH (ref 7–23)
BUN SERPL-MCNC: 27 MG/DL — HIGH (ref 7–23)
CALCIUM SERPL-MCNC: 8.5 MG/DL — SIGNIFICANT CHANGE UP (ref 8.4–10.5)
CALCIUM SERPL-MCNC: 8.6 MG/DL — SIGNIFICANT CHANGE UP (ref 8.4–10.5)
CHLORIDE SERPL-SCNC: 91 MMOL/L — LOW (ref 96–108)
CHLORIDE SERPL-SCNC: 95 MMOL/L — LOW (ref 96–108)
CO2 SERPL-SCNC: 25 MMOL/L — SIGNIFICANT CHANGE UP (ref 22–31)
CO2 SERPL-SCNC: 25 MMOL/L — SIGNIFICANT CHANGE UP (ref 22–31)
CREAT ?TM UR-MCNC: 132 MG/DL — SIGNIFICANT CHANGE UP
CREAT SERPL-MCNC: 0.8 MG/DL — SIGNIFICANT CHANGE UP (ref 0.5–1.3)
CREAT SERPL-MCNC: 0.95 MG/DL — SIGNIFICANT CHANGE UP (ref 0.5–1.3)
EGFR: 80 ML/MIN/1.73M2 — SIGNIFICANT CHANGE UP
EGFR: 89 ML/MIN/1.73M2 — SIGNIFICANT CHANGE UP
GLUCOSE SERPL-MCNC: 90 MG/DL — SIGNIFICANT CHANGE UP (ref 70–99)
GLUCOSE SERPL-MCNC: 95 MG/DL — SIGNIFICANT CHANGE UP (ref 70–99)
HCT VFR BLD CALC: 42 % — SIGNIFICANT CHANGE UP (ref 39–50)
HGB BLD-MCNC: 14.1 G/DL — SIGNIFICANT CHANGE UP (ref 13–17)
INTERPRETATION 24H UR IFE-IMP: SIGNIFICANT CHANGE UP
M PROTEIN 24H UR ELPH-MRATE: SIGNIFICANT CHANGE UP
MCHC RBC-ENTMCNC: 30.9 PG — SIGNIFICANT CHANGE UP (ref 27–34)
MCHC RBC-ENTMCNC: 33.6 GM/DL — SIGNIFICANT CHANGE UP (ref 32–36)
MCV RBC AUTO: 91.9 FL — SIGNIFICANT CHANGE UP (ref 80–100)
NRBC # BLD: 0 /100 WBCS — SIGNIFICANT CHANGE UP (ref 0–0)
OSMOLALITY UR: 741 MOSM/KG — SIGNIFICANT CHANGE UP (ref 300–900)
PLATELET # BLD AUTO: 319 K/UL — SIGNIFICANT CHANGE UP (ref 150–400)
POTASSIUM SERPL-MCNC: 4.4 MMOL/L — SIGNIFICANT CHANGE UP (ref 3.5–5.3)
POTASSIUM SERPL-MCNC: 4.4 MMOL/L — SIGNIFICANT CHANGE UP (ref 3.5–5.3)
POTASSIUM SERPL-SCNC: 4.4 MMOL/L — SIGNIFICANT CHANGE UP (ref 3.5–5.3)
POTASSIUM SERPL-SCNC: 4.4 MMOL/L — SIGNIFICANT CHANGE UP (ref 3.5–5.3)
PROT ?TM UR-MCNC: 95 MG/DL — HIGH (ref 0–12)
PROT PATTERN 24H UR ELPH-IMP: SIGNIFICANT CHANGE UP
RBC # BLD: 4.57 M/UL — SIGNIFICANT CHANGE UP (ref 4.2–5.8)
RBC # FLD: 13.3 % — SIGNIFICANT CHANGE UP (ref 10.3–14.5)
RH IG SCN BLD-IMP: POSITIVE — SIGNIFICANT CHANGE UP
SODIUM SERPL-SCNC: 128 MMOL/L — LOW (ref 135–145)
SODIUM SERPL-SCNC: 131 MMOL/L — LOW (ref 135–145)
SODIUM UR-SCNC: 20 MMOL/L — SIGNIFICANT CHANGE UP
TOTAL VOLUME - 24 HOUR: SIGNIFICANT CHANGE UP ML
URINE CREATININE CALCULATION: SIGNIFICANT CHANGE UP G/24 H (ref 1–2)
WBC # BLD: 9.77 K/UL — SIGNIFICANT CHANGE UP (ref 3.8–10.5)
WBC # FLD AUTO: 9.77 K/UL — SIGNIFICANT CHANGE UP (ref 3.8–10.5)

## 2024-07-16 PROCEDURE — 88342 IMHCHEM/IMCYTCHM 1ST ANTB: CPT | Mod: 26

## 2024-07-16 PROCEDURE — 27303 DRAINAGE OF BONE LESION: CPT | Mod: LT

## 2024-07-16 PROCEDURE — 88341 IMHCHEM/IMCYTCHM EA ADD ANTB: CPT | Mod: 26

## 2024-07-16 PROCEDURE — 88307 TISSUE EXAM BY PATHOLOGIST: CPT | Mod: 26

## 2024-07-16 PROCEDURE — 88311 DECALCIFY TISSUE: CPT | Mod: 26

## 2024-07-16 PROCEDURE — 99232 SBSQ HOSP IP/OBS MODERATE 35: CPT

## 2024-07-16 PROCEDURE — 88331 PATH CONSLTJ SURG 1 BLK 1SPC: CPT | Mod: 26

## 2024-07-16 PROCEDURE — 99233 SBSQ HOSP IP/OBS HIGH 50: CPT

## 2024-07-16 PROCEDURE — 27507 TREATMENT OF THIGH FRACTURE: CPT | Mod: LT

## 2024-07-16 PROCEDURE — 88334 PATH CONSLTJ SURG CYTO XM EA: CPT | Mod: 26,59

## 2024-07-16 DEVICE — IMPLANTABLE DEVICE: Type: IMPLANTABLE DEVICE | Status: FUNCTIONAL

## 2024-07-16 DEVICE — SCREW LOKG 5X36MM: Type: IMPLANTABLE DEVICE | Status: FUNCTIONAL

## 2024-07-16 DEVICE — SCREW LOKG 5X32MM: Type: IMPLANTABLE DEVICE | Status: FUNCTIONAL

## 2024-07-16 RX ORDER — BACTERIOSTATIC SODIUM CHLORIDE 0.9 %
500 VIAL (ML) INJECTION ONCE
Refills: 0 | Status: COMPLETED | OUTPATIENT
Start: 2024-07-16 | End: 2024-07-16

## 2024-07-16 RX ORDER — CHLORHEXIDINE GLUCONATE 500 MG/1
1 CLOTH TOPICAL EVERY 12 HOURS
Refills: 0 | Status: COMPLETED | OUTPATIENT
Start: 2024-07-16 | End: 2024-07-17

## 2024-07-16 RX ORDER — BACTERIOSTATIC SODIUM CHLORIDE 0.9 %
1000 VIAL (ML) INJECTION
Refills: 0 | Status: DISCONTINUED | OUTPATIENT
Start: 2024-07-16 | End: 2024-07-21

## 2024-07-16 RX ORDER — POVIDONE-IODINE 0.1 G/ML
1 SOLUTION TOPICAL ONCE
Refills: 0 | Status: COMPLETED | OUTPATIENT
Start: 2024-07-16 | End: 2024-07-16

## 2024-07-16 RX ORDER — CEFAZOLIN SODIUM 10 G
2000 VIAL (EA) INJECTION EVERY 8 HOURS
Refills: 0 | Status: COMPLETED | OUTPATIENT
Start: 2024-07-17 | End: 2024-07-17

## 2024-07-16 RX ORDER — ENOXAPARIN SODIUM 120 MG/.8ML
40 INJECTION SUBCUTANEOUS EVERY 24 HOURS
Refills: 0 | Status: DISCONTINUED | OUTPATIENT
Start: 2024-07-17 | End: 2024-07-21

## 2024-07-16 RX ADMIN — Medication 1000 MILLIGRAM(S): at 05:07

## 2024-07-16 RX ADMIN — Medication 1000 MILLIGRAM(S): at 21:32

## 2024-07-16 RX ADMIN — Medication 0.5 MILLIGRAM(S): at 11:04

## 2024-07-16 RX ADMIN — ATENOLOL 25 MILLIGRAM(S): 100 TABLET ORAL at 06:03

## 2024-07-16 RX ADMIN — Medication 1000 MILLIGRAM(S): at 21:31

## 2024-07-16 RX ADMIN — Medication 5 MILLIGRAM(S): at 09:06

## 2024-07-16 RX ADMIN — OXYCODONE HYDROCHLORIDE 10 MILLIGRAM(S): 30 TABLET ORAL at 01:59

## 2024-07-16 RX ADMIN — POVIDONE-IODINE 1 APPLICATION(S): 0.1 SOLUTION TOPICAL at 14:50

## 2024-07-16 RX ADMIN — OXYCODONE HYDROCHLORIDE 10 MILLIGRAM(S): 30 TABLET ORAL at 00:59

## 2024-07-16 RX ADMIN — OXYCODONE HYDROCHLORIDE 10 MILLIGRAM(S): 30 TABLET ORAL at 14:46

## 2024-07-16 RX ADMIN — Medication 120 MILLILITER(S): at 13:46

## 2024-07-16 RX ADMIN — ATORVASTATIN CALCIUM 40 MILLIGRAM(S): 40 TABLET, FILM COATED ORAL at 21:31

## 2024-07-16 RX ADMIN — AMLODIPINE BESYLATE 10 MILLIGRAM(S): 2.5 TABLET ORAL at 06:03

## 2024-07-16 RX ADMIN — Medication 0.5 MILLIGRAM(S): at 06:03

## 2024-07-16 RX ADMIN — DEXTROSE MONOHYDRATE, SODIUM CHLORIDE, SODIUM LACTATE, CALCIUM CHLORIDE, MAGNESIUM CHLORIDE 120 MILLILITER(S): 1.5; 538; 448; 18.4; 5.08 SOLUTION INTRAPERITONEAL at 00:00

## 2024-07-16 RX ADMIN — Medication 2000 UNIT(S): at 12:04

## 2024-07-16 RX ADMIN — OXYCODONE HYDROCHLORIDE 10 MILLIGRAM(S): 30 TABLET ORAL at 06:07

## 2024-07-16 RX ADMIN — OXYCODONE HYDROCHLORIDE 10 MILLIGRAM(S): 30 TABLET ORAL at 10:07

## 2024-07-16 RX ADMIN — Medication 1000 MILLIGRAM(S): at 13:46

## 2024-07-16 RX ADMIN — OXYCODONE HYDROCHLORIDE 10 MILLIGRAM(S): 30 TABLET ORAL at 09:07

## 2024-07-16 RX ADMIN — OXYCODONE HYDROCHLORIDE 10 MILLIGRAM(S): 30 TABLET ORAL at 22:52

## 2024-07-16 RX ADMIN — OXYCODONE HYDROCHLORIDE 10 MILLIGRAM(S): 30 TABLET ORAL at 23:52

## 2024-07-16 RX ADMIN — Medication 0.5 MILLIGRAM(S): at 03:00

## 2024-07-16 RX ADMIN — Medication 0.5 MILLIGRAM(S): at 02:00

## 2024-07-16 RX ADMIN — Medication 250 MILLILITER(S): at 12:10

## 2024-07-16 RX ADMIN — CHLORHEXIDINE GLUCONATE 1 APPLICATION(S): 500 CLOTH TOPICAL at 10:50

## 2024-07-16 RX ADMIN — Medication 0.5 MILLIGRAM(S): at 06:30

## 2024-07-16 RX ADMIN — Medication 0.5 MILLIGRAM(S): at 10:34

## 2024-07-16 RX ADMIN — OXYCODONE HYDROCHLORIDE 10 MILLIGRAM(S): 30 TABLET ORAL at 13:46

## 2024-07-16 RX ADMIN — OXYCODONE HYDROCHLORIDE 10 MILLIGRAM(S): 30 TABLET ORAL at 05:07

## 2024-07-16 RX ADMIN — DEXTROSE MONOHYDRATE, SODIUM CHLORIDE, SODIUM LACTATE, CALCIUM CHLORIDE, MAGNESIUM CHLORIDE 120 MILLILITER(S): 1.5; 538; 448; 18.4; 5.08 SOLUTION INTRAPERITONEAL at 12:04

## 2024-07-16 NOTE — PRE-OP CHECKLIST - SELECT TESTS ORDERED
BMP/CBC/PT/PTT/INR/Type and Screen/Urinalysis/EKG/CXR BMP/CBC/PT/PTT/INR/Type and Screen/Urinalysis/EKG/CXR/Results in MD note

## 2024-07-16 NOTE — PROGRESS NOTE ADULT - SUBJECTIVE AND OBJECTIVE BOX
Ortho Post Op Check    Procedure: L femur Open biopsy with provisional ORIF   Surgeon:     Pt comfortable without complaints, pain controlled  Denies CP, SOB, N/V, numbness/tingling     Vital Signs Last 24 Hrs  T(C): 36.4 (07-16-24 @ 22:12), Max: 36.7 (07-16-24 @ 19:42)  T(F): 97.6 (07-16-24 @ 22:12), Max: 98 (07-16-24 @ 19:42)  HR: 64 (07-16-24 @ 22:12) (57 - 65)  BP: 137/81 (07-16-24 @ 22:12) (105/61 - 141/67)  BP(mean): 90 (07-16-24 @ 21:27) (78 - 96)  RR: 17 (07-16-24 @ 22:12) (16 - 29)  SpO2: 95% (07-16-24 @ 22:12) (94% - 100%)  I&O's Summary    15 Jul 2024 07:01  -  16 Jul 2024 07:00  --------------------------------------------------------  IN: 0 mL / OUT: 750 mL / NET: -750 mL    16 Jul 2024 07:01  -  16 Jul 2024 23:12  --------------------------------------------------------  IN: 1820 mL / OUT: 450 mL / NET: 1370 mL        General: Pt Alert and oriented, NAD  DSG C/D/I  Pulses: 2+  Sensation: SILT  Motor: 5/5 EHL/FHL/TA/GS                          14.1   9.77  )-----------( 319      ( 16 Jul 2024 05:30 )             42.0     07-16    131<L>  |  95<L>  |  25<H>  ----------------------------<  90  4.4   |  25  |  0.80    Ca    8.5      16 Jul 2024 14:40        A/P: 79yMale POD#0 s/p L femur Open biopsy with provisional ORIF  - Stable  - Pain Control  - DVT ppx: SCD  - Post op abx: ancef  - PT, WBS: NWB bedrest    Ortho Pager [phone]

## 2024-07-16 NOTE — PROGRESS NOTE ADULT - SUBJECTIVE AND OBJECTIVE BOX
INTERVAL HPI/OVERNIGHT EVENTS: sravanthi o/n    SUBJECTIVE: Patient seen and examined at bedside.   Reports pain relieved w PRN pain medication. No fever, chest pain, dyspnea. +Flatus but no BM. Voiding via condom cath  Eating little - only taking in mostly fluids. Reports little appetite.    OBJECTIVE:    VITAL SIGNS:  ICU Vital Signs Last 24 Hrs  T(C): 37.2 (16 Jul 2024 16:19), Max: 37.2 (16 Jul 2024 16:11)  T(F): 99 (16 Jul 2024 16:11), Max: 99 (16 Jul 2024 16:11)  HR: 67 (16 Jul 2024 16:19) (58 - 981)  BP: 150/72 (16 Jul 2024 16:19) (126/76 - 151/76)  BP(mean): 99 (16 Jul 2024 16:19) (99 - 101)  ABP: --  ABP(mean): --  RR: 16 (16 Jul 2024 16:19) (16 - 18)  SpO2: 96% (16 Jul 2024 16:19) (95% - 96%)    O2 Parameters below as of 16 Jul 2024 15:04  Patient On (Oxygen Delivery Method): room air              07-15 @ 07:01  -  07-16 @ 07:00  --------------------------------------------------------  IN: 0 mL / OUT: 750 mL / NET: -750 mL    07-16 @ 07:01  -  07-16 @ 17:45  --------------------------------------------------------  IN: 1460 mL / OUT: 0 mL / NET: 1460 mL      CAPILLARY BLOOD GLUCOSE          PHYSICAL EXAM:  GEN: Male in NAD on RA  HEENT: NC/AT, MMM  CV: RRR, nml S1S2, no murmurs  PULM: nml effort, CTAB  ABD: Soft, non-distended, NABS, non-tender  NEURO  A/O x3, moving all extremities, Sensation intact  L leg in traction  PSYCH: Appropriate    MEDICATIONS:  MEDICATIONS  (STANDING):  acetaminophen     Tablet .. 1000 milliGRAM(s) Oral every 8 hours  amLODIPine   Tablet 10 milliGRAM(s) Oral daily  atenolol  Tablet 25 milliGRAM(s) Oral daily  atorvastatin 40 milliGRAM(s) Oral at bedtime  chlorhexidine 2% Cloths 1 Application(s) Topical every 12 hours  cholecalciferol 2000 Unit(s) Oral daily  sodium chloride 0.9%. 1000 milliLiter(s) (120 mL/Hr) IV Continuous <Continuous>    MEDICATIONS  (PRN):  ALPRAZolam 0.5 milliGRAM(s) Oral daily PRN anxiety  bisacodyl 5 milliGRAM(s) Oral every 12 hours PRN Constipation  HYDROmorphone  Injectable 0.5 milliGRAM(s) IV Push every 4 hours PRN breakthrough pain on floor  oxyCODONE    IR 10 milliGRAM(s) Oral every 4 hours PRN Severe Pain (7 - 10)  oxyCODONE    IR 5 milliGRAM(s) Oral every 4 hours PRN Moderate Pain (4 - 6)      ALLERGIES:  Allergies    No Known Allergies    Intolerances        LABS:                        14.1   9.77  )-----------( 319      ( 16 Jul 2024 05:30 )             42.0     07-16    131<L>  |  95<L>  |  25<H>  ----------------------------<  90  4.4   |  25  |  0.80    Ca    8.5      16 Jul 2024 14:40        Urinalysis Basic - ( 16 Jul 2024 14:40 )    Color: x / Appearance: x / SG: x / pH: x  Gluc: 90 mg/dL / Ketone: x  / Bili: x / Urobili: x   Blood: x / Protein: x / Nitrite: x   Leuk Esterase: x / RBC: x / WBC x   Sq Epi: x / Non Sq Epi: x / Bacteria: x        RADIOLOGY & ADDITIONAL TESTS: Reviewed.

## 2024-07-16 NOTE — PROGRESS NOTE ADULT - SUBJECTIVE AND OBJECTIVE BOX
Ortho Note    Subjective:  Pt seen and examined at bedside  pain controlled  endorsing constipation  Denies CP, SOB, N/V, numbness/tingling       Vital Signs Last 24 Hrs  T(C): 36.3 (16 Jul 2024 05:25), Max: 37.2 (15 Jul 2024 09:11)  T(F): 97.4 (16 Jul 2024 05:25), Max: 99 (15 Jul 2024 09:11)  HR: 72 (16 Jul 2024 05:25) (61 - 74)  BP: 133/73 (16 Jul 2024 05:25) (126/76 - 168/78)  BP(mean): --  RR: 18 (16 Jul 2024 05:25) (17 - 18)  SpO2: 95% (16 Jul 2024 05:25) (95% - 97%)    Parameters below as of 16 Jul 2024 05:25  Patient On (Oxygen Delivery Method): room air      Objective:    Physical Exam:  General: Pt Alert and oriented, NAD  Pulses: +2 DP pulses,   Sensation: silt intact bilateral LE  Motor: EHL/FHL/TA/GS- LLE in bucks traction- skin intact able to wiggle toes          Plan of Care:  A/P: 79M w HTN, HLD, Prostate CA s/p XRT w chronic L leg pain for months p/w fall after L leg gave out on him, found to have proximal femoral shaft fracture, for OR w Dr. Shaw today  - Pain Control- tylenol 1000mg PO Q8h, Dilaudid 0.5mg Q4h prn breakthrough pain, Oxycodone 5-10mg PO Q4h prn moderate to severe pain   - DVT ppx: Lovenox 40mg SUbq Q24h   - PT, WBS: NWB LLE in bucks traction   - f/u NA  - appreciate medicine recs  - bowel regimen,   - F/u Spep, Upep - pending  - Dispo- OR    Ortho Pager [phone]

## 2024-07-16 NOTE — PROGRESS NOTE ADULT - SUBJECTIVE AND OBJECTIVE BOX
Ortho Note    Subjective:  Pt seen and examined today , patient reporting LLE pain, pain controlled when patient receives oxycodone PO   Reviewed plan of care for OR with patient and family at bedside      Vital Signs Last 24 Hrs  T(C): 36.6 (07-16-24 @ 08:51), Max: 36.6 (07-16-24 @ 08:51)  T(F): 97.8 (07-16-24 @ 08:51), Max: 97.8 (07-16-24 @ 08:51)  HR: 59 (07-16-24 @ 08:51) (59 - 59)  BP: 151/76 (07-16-24 @ 08:51) (151/76 - 151/76)  BP(mean): 101 (07-16-24 @ 08:51) (101 - 101)  RR: 17 (07-16-24 @ 08:51) (17 - 17)  SpO2: 95% (07-16-24 @ 08:51) (95% - 95%)  AVSS    Objective:    Physical Exam:  General: Pt Alert and oriented, NAD  Pulses: +2 DP pulses,   Sensation: silt intact bilateral LE  Motor: EHL/FHL/TA/GS- LLE in bucks traction- skin intact able to wiggle toes  condom cath in placed           Plan of Care:  A/P: 79M w HTN, HLD, Prostate CA s/p XRT w chronic L leg pain for months p/w fall after L leg gave out on him, found to have proximal femoral shaft fracture, for OR w Dr. Pelayo time TBD  - afebrile, wbcs 9.77  - Pain Control- tylenol 1000mg PO Q8h, Dilaudid 0.5mg Q4h prn breakthrough pain, Oxycodone 5-10mg PO Q4h prn moderate to severe pain   - DVT ppx: held for OR   - PT, WBS: NWB LLE in bucks traction   - - hyponatremia, urine lytes ordered ,  cc bolus over 2 hours given , fluids changed from LR to NS while NPO   - notified PCP Dr. Gabriel Goldberg patients pcp of his admission , reviewed course of care   - appreciate medicine recs  - bowel regimen,   - NPO for OR,   - F/u Spep, Upep - resulted,   - Dispo- or  pending medical clearance     Ortho Pager [phone] Ortho Note    Subjective:  Pt seen and examined today , patient reporting LLE pain, pain controlled when patient receives oxycodone PO   Reviewed plan of care for OR with patient and family at bedside      Vital Signs Last 24 Hrs  T(C): 36.6 (07-16-24 @ 08:51), Max: 36.6 (07-16-24 @ 08:51)  T(F): 97.8 (07-16-24 @ 08:51), Max: 97.8 (07-16-24 @ 08:51)  HR: 59 (07-16-24 @ 08:51) (59 - 59)  BP: 151/76 (07-16-24 @ 08:51) (151/76 - 151/76)  BP(mean): 101 (07-16-24 @ 08:51) (101 - 101)  RR: 17 (07-16-24 @ 08:51) (17 - 17)  SpO2: 95% (07-16-24 @ 08:51) (95% - 95%)  AVSS    Objective:    Physical Exam:  General: Pt Alert and oriented, NAD  Pulses: +2 DP pulses,   Sensation: silt intact bilateral LE  Motor: EHL/FHL/TA/GS- LLE in bucks traction- skin intact able to wiggle toes  condom cath in place          Plan of Care:  A/P: 79M w HTN, HLD, Prostate CA s/p XRT w chronic L leg pain for months p/w fall after L leg gave out on him, found to have proximal femoral shaft fracture, for OR w Dr. Pelayo time TBD  - afebrile, wbcs 9.77  - Pain Control- tylenol 1000mg PO Q8h, Dilaudid 0.5mg Q4h prn breakthrough pain, Oxycodone 5-10mg PO Q4h prn moderate to severe pain   - DVT ppx: held for OR   - PT, WBS: NWB LLE in bucks traction   - - hyponatremia, urine lytes ordered ,  cc bolus over 2 hours given , fluids changed from LR to NS while NPO , repeat BMP after bolus   - notified PCP Dr. Gabriel Goldberg patients pcp of his admission , reviewed course of care   - appreciate medicine recs  - bowel regimen,   - NPO for OR, LR @ 120ml.hour changed to NS @ 120 ml/barbie   - F/u Spep, Upep - resulted, discussed plan with family and Dr. Pelayo   - Dispo- OR today, with Dr. Shaw for biopsy and Left Hip IMN     Ortho Pager [phone] Ortho Note    Subjective:  Pt seen and examined today , patient reporting LLE pain, pain controlled when patient receives oxycodone PO   Reviewed plan of care for OR with patient and family at bedside      Vital Signs Last 24 Hrs  T(C): 36.6 (07-16-24 @ 08:51), Max: 36.6 (07-16-24 @ 08:51)  T(F): 97.8 (07-16-24 @ 08:51), Max: 97.8 (07-16-24 @ 08:51)  HR: 59 (07-16-24 @ 08:51) (59 - 59)  BP: 151/76 (07-16-24 @ 08:51) (151/76 - 151/76)  BP(mean): 101 (07-16-24 @ 08:51) (101 - 101)  RR: 17 (07-16-24 @ 08:51) (17 - 17)  SpO2: 95% (07-16-24 @ 08:51) (95% - 95%)  AVSS    Objective:    Physical Exam:  General: Pt Alert and oriented, NAD  Pulses: +2 DP pulses,   Sensation: silt intact bilateral LE  Motor: EHL/FHL/TA/GS- LLE in bucks traction- skin intact able to wiggle toes  condom cath in place          Plan of Care:  A/P: 79M w HTN, HLD, Prostate CA s/p XRT w chronic L leg pain for months p/w fall after L leg gave out on him, found to have proximal femoral shaft fracture, for OR w Dr. Pelayo time TBD  - afebrile, wbcs 9.77  - Pain Control- tylenol 1000mg PO Q8h, Dilaudid 0.5mg Q4h prn breakthrough pain, Oxycodone 5-10mg PO Q4h prn moderate to severe pain   - DVT ppx: held for OR   - PT, WBS: NWB LLE in bucks traction   - - hyponatremia, urine lytes ordered ,  cc bolus over 2 hours given , fluids changed from LR to NS while NPO , repeat BMP after bolus   - notified PCP Dr. Gabriel Goldberg patients pcp of his admission , reviewed course of care   - appreciate medicine recs  - bowel regimen,   - NPO for OR, LR @ 120ml.hour changed to NS @ 120 ml/barbie   - F/u Spep, Upep - resulted, discussed plan with family and Dr. Pelayo   - Dispo- OR today, with Dr. Shaw for biopsy and Left Hip IMN     Ortho Pager [phone]      repeat sodium 131-

## 2024-07-16 NOTE — PROGRESS NOTE ADULT - ASSESSMENT
79M w HTN, HLD, Prostate CA s/p XRT w chronic L leg pain for months p/w fall after L leg gave out on him, found to have proximal femoral shaft fracture, for OR w Dr. Shaw 7/16    #Pre-op evaluation   - EKG: Normal sinus rhythm   - METS >4   - RCRI: Class I risk   - CONDON: 0.2% Risk of myocardial infarction or cardiac arrest, intraoperatively or up to 30 days post-op    #L Proximal femur fracture   - CT C/A/P wo evidence of metastatic disease. Enlarged prostate wo fiducials   - MR L femur: pathologic fracture w displacement - some soft tissue present suggestive of mass w permeative marrow changes extending to lesser trochanter and mid-distal 3rd of femoral diaphysis.    - bone scan - signal at femoral shaft    #HTN - home on atenolol 25mg daily, amlodipine 10mg daily, ramipril 10mg daily  #HLD - home on lipitor 40mg daily  #Leukocytosis - Resolved  #Vitamin D insufficiency - Suboptimal - borderline w 29.9   - increased supplementation to 2000 IU daily D3  #Hyponatremia - 128 from 130 today.     Plan  Josephine indicates pre-renal. Would encourage increased PO/solute intake. Switch to NS from LR. Can give 500cc NS over 2 hours  --f/u SPEP, UPEP    Agree pt has good functional status and is low risk for intermediate risk procedure    Co-management will continue to follow  Above d/w orthopedics

## 2024-07-17 LAB
% ALBUMIN: 57.7 % — SIGNIFICANT CHANGE UP
% ALPHA 1: 5.5 % — SIGNIFICANT CHANGE UP
% ALPHA 2: 9.9 % — SIGNIFICANT CHANGE UP
% BETA: 12.6 % — SIGNIFICANT CHANGE UP
% GAMMA: 14.3 % — SIGNIFICANT CHANGE UP
ALBUMIN SERPL ELPH-MCNC: 3.8 G/DL — SIGNIFICANT CHANGE UP (ref 3.6–5.5)
ALBUMIN/GLOB SERPL ELPH: 1.4 RATIO — SIGNIFICANT CHANGE UP
ALPHA1 GLOB SERPL ELPH-MCNC: 0.4 G/DL — SIGNIFICANT CHANGE UP (ref 0.1–0.4)
ALPHA2 GLOB SERPL ELPH-MCNC: 0.7 G/DL — SIGNIFICANT CHANGE UP (ref 0.5–1)
ANION GAP SERPL CALC-SCNC: 13 MMOL/L — SIGNIFICANT CHANGE UP (ref 5–17)
B-GLOBULIN SERPL ELPH-MCNC: 0.8 G/DL — SIGNIFICANT CHANGE UP (ref 0.5–1)
BUN SERPL-MCNC: 26 MG/DL — HIGH (ref 7–23)
CALCIUM SERPL-MCNC: 8.8 MG/DL — SIGNIFICANT CHANGE UP (ref 8.4–10.5)
CHLORIDE SERPL-SCNC: 94 MMOL/L — LOW (ref 96–108)
CO2 SERPL-SCNC: 21 MMOL/L — LOW (ref 22–31)
CREAT SERPL-MCNC: 0.76 MG/DL — SIGNIFICANT CHANGE UP (ref 0.5–1.3)
EGFR: 90 ML/MIN/1.73M2 — SIGNIFICANT CHANGE UP
GAMMA GLOBULIN: 0.9 G/DL — SIGNIFICANT CHANGE UP (ref 0.6–1.6)
GLUCOSE SERPL-MCNC: 133 MG/DL — HIGH (ref 70–99)
HCT VFR BLD CALC: 42.8 % — SIGNIFICANT CHANGE UP (ref 39–50)
HGB BLD-MCNC: 14.8 G/DL — SIGNIFICANT CHANGE UP (ref 13–17)
INTERPRETATION SERPL IFE-IMP: SIGNIFICANT CHANGE UP
MCHC RBC-ENTMCNC: 31.6 PG — SIGNIFICANT CHANGE UP (ref 27–34)
MCHC RBC-ENTMCNC: 34.6 GM/DL — SIGNIFICANT CHANGE UP (ref 32–36)
MCV RBC AUTO: 91.3 FL — SIGNIFICANT CHANGE UP (ref 80–100)
NRBC # BLD: 0 /100 WBCS — SIGNIFICANT CHANGE UP (ref 0–0)
PLATELET # BLD AUTO: 369 K/UL — SIGNIFICANT CHANGE UP (ref 150–400)
POTASSIUM SERPL-MCNC: 4.2 MMOL/L — SIGNIFICANT CHANGE UP (ref 3.5–5.3)
POTASSIUM SERPL-SCNC: 4.2 MMOL/L — SIGNIFICANT CHANGE UP (ref 3.5–5.3)
PROT PATTERN SERPL ELPH-IMP: SIGNIFICANT CHANGE UP
PROT SERPL-MCNC: 6.6 G/DL — SIGNIFICANT CHANGE UP (ref 6–8.3)
RBC # BLD: 4.69 M/UL — SIGNIFICANT CHANGE UP (ref 4.2–5.8)
RBC # FLD: 13.3 % — SIGNIFICANT CHANGE UP (ref 10.3–14.5)
SODIUM SERPL-SCNC: 128 MMOL/L — LOW (ref 135–145)
TESTOST FREE SERPL-MCNC: 2.8 PG/ML — LOW (ref 5.9–27)
WBC # BLD: 8.79 K/UL — SIGNIFICANT CHANGE UP (ref 3.8–10.5)
WBC # FLD AUTO: 8.79 K/UL — SIGNIFICANT CHANGE UP (ref 3.8–10.5)

## 2024-07-17 PROCEDURE — 99232 SBSQ HOSP IP/OBS MODERATE 35: CPT

## 2024-07-17 PROCEDURE — 99233 SBSQ HOSP IP/OBS HIGH 50: CPT

## 2024-07-17 RX ORDER — LORATADINE 10 MG
17 TABLET,DISINTEGRATING ORAL DAILY
Refills: 0 | Status: DISCONTINUED | OUTPATIENT
Start: 2024-07-17 | End: 2024-07-29

## 2024-07-17 RX ORDER — SENNOSIDES 8.6 MG/1
1 TABLET ORAL DAILY
Refills: 0 | Status: DISCONTINUED | OUTPATIENT
Start: 2024-07-17 | End: 2024-07-29

## 2024-07-17 RX ORDER — BACTERIOSTATIC SODIUM CHLORIDE 0.9 %
500 VIAL (ML) INJECTION ONCE
Refills: 0 | Status: COMPLETED | OUTPATIENT
Start: 2024-07-17 | End: 2024-07-17

## 2024-07-17 RX ORDER — LACTULOSE 10 G/15 ML
200 SOLUTION, ORAL ORAL ONCE
Refills: 0 | Status: DISCONTINUED | OUTPATIENT
Start: 2024-07-17 | End: 2024-07-17

## 2024-07-17 RX ORDER — LACTULOSE 10 G/15 ML
20 SOLUTION, ORAL ORAL DAILY
Refills: 0 | Status: DISCONTINUED | OUTPATIENT
Start: 2024-07-17 | End: 2024-07-29

## 2024-07-17 RX ORDER — TAMSULOSIN HCL 0.4 MG
0.4 CAPSULE ORAL AT BEDTIME
Refills: 0 | Status: DISCONTINUED | OUTPATIENT
Start: 2024-07-17 | End: 2024-07-29

## 2024-07-17 RX ADMIN — Medication 0.5 MILLIGRAM(S): at 01:13

## 2024-07-17 RX ADMIN — Medication 1000 MILLIGRAM(S): at 14:09

## 2024-07-17 RX ADMIN — OXYCODONE HYDROCHLORIDE 5 MILLIGRAM(S): 30 TABLET ORAL at 12:01

## 2024-07-17 RX ADMIN — Medication 17 GRAM(S): at 11:02

## 2024-07-17 RX ADMIN — ENOXAPARIN SODIUM 40 MILLIGRAM(S): 120 INJECTION SUBCUTANEOUS at 06:28

## 2024-07-17 RX ADMIN — Medication 500 MILLILITER(S): at 18:30

## 2024-07-17 RX ADMIN — SENNOSIDES 1 TABLET(S): 8.6 TABLET ORAL at 11:02

## 2024-07-17 RX ADMIN — OXYCODONE HYDROCHLORIDE 5 MILLIGRAM(S): 30 TABLET ORAL at 21:46

## 2024-07-17 RX ADMIN — OXYCODONE HYDROCHLORIDE 5 MILLIGRAM(S): 30 TABLET ORAL at 17:10

## 2024-07-17 RX ADMIN — Medication 0.5 MILLIGRAM(S): at 12:50

## 2024-07-17 RX ADMIN — Medication 0.5 MILLIGRAM(S): at 12:34

## 2024-07-17 RX ADMIN — Medication 2000 UNIT(S): at 11:02

## 2024-07-17 RX ADMIN — AMLODIPINE BESYLATE 10 MILLIGRAM(S): 2.5 TABLET ORAL at 06:23

## 2024-07-17 RX ADMIN — Medication 20 GRAM(S): at 11:01

## 2024-07-17 RX ADMIN — OXYCODONE HYDROCHLORIDE 10 MILLIGRAM(S): 30 TABLET ORAL at 04:23

## 2024-07-17 RX ADMIN — ATORVASTATIN CALCIUM 40 MILLIGRAM(S): 40 TABLET, FILM COATED ORAL at 21:46

## 2024-07-17 RX ADMIN — Medication 1000 MILLIGRAM(S): at 06:23

## 2024-07-17 RX ADMIN — Medication 0.5 MILLIGRAM(S): at 00:13

## 2024-07-17 RX ADMIN — Medication 1000 MILLIGRAM(S): at 22:46

## 2024-07-17 RX ADMIN — OXYCODONE HYDROCHLORIDE 5 MILLIGRAM(S): 30 TABLET ORAL at 11:01

## 2024-07-17 RX ADMIN — Medication 100 MILLIGRAM(S): at 01:54

## 2024-07-17 RX ADMIN — OXYCODONE HYDROCHLORIDE 5 MILLIGRAM(S): 30 TABLET ORAL at 18:10

## 2024-07-17 RX ADMIN — OXYCODONE HYDROCHLORIDE 5 MILLIGRAM(S): 30 TABLET ORAL at 22:46

## 2024-07-17 RX ADMIN — Medication 1000 MILLIGRAM(S): at 13:09

## 2024-07-17 RX ADMIN — Medication 1000 MILLIGRAM(S): at 21:46

## 2024-07-17 RX ADMIN — Medication 100 MILLIGRAM(S): at 11:01

## 2024-07-17 RX ADMIN — OXYCODONE HYDROCHLORIDE 10 MILLIGRAM(S): 30 TABLET ORAL at 03:23

## 2024-07-17 RX ADMIN — Medication 0.4 MILLIGRAM(S): at 21:45

## 2024-07-17 RX ADMIN — Medication 5 MILLIGRAM(S): at 06:28

## 2024-07-17 NOTE — PROGRESS NOTE ADULT - ASSESSMENT
Izaiah: 79M w HTN, HLD, Prostate CA s/p XRT w chronic L leg pain for months p/w fall after L leg gave out on him, found to have proximal femoral shaft fracture. Retained 450cc after bone biopsy, with 450cc of urine. Feeling better overnight, no urinary symptoms and tolerating catheter.   AVSS, UOP is 1650 since the surgery. labs WNL. UA negative on admission.     - Plan continue garvey cath  - f/u bone biopsy path  - Optimize for TOV with ambulation, bowel regimen and limitation of narcotics and anticholinergics  - Flomax 0.4mg QD  - Likely TOV tomorrow vs today

## 2024-07-17 NOTE — PROGRESS NOTE ADULT - SUBJECTIVE AND OBJECTIVE BOX
INTERVAL HPI/OVERNIGHT EVENTS: sravanthi o/n    SUBJECTIVE: Patient seen and examined at bedside.   Pt worked w PT - no LH/dizziness, chest pain, dyspnea. No fevers. +flatus but no BM. Abd still slightly distended. No fevers. Franklin placed overnight.     OBJECTIVE:    VITAL SIGNS:  ICU Vital Signs Last 24 Hrs  T(C): 37.1 (17 Jul 2024 17:25), Max: 37.1 (17 Jul 2024 17:25)  T(F): 98.7 (17 Jul 2024 17:25), Max: 98.7 (17 Jul 2024 17:25)  HR: 78 (17 Jul 2024 17:25) (57 - 78)  BP: 127/73 (17 Jul 2024 17:25) (105/61 - 141/67)  BP(mean): 90 (16 Jul 2024 21:27) (78 - 96)  ABP: --  ABP(mean): --  RR: 18 (17 Jul 2024 17:25) (16 - 29)  SpO2: 96% (17 Jul 2024 17:25) (94% - 100%)    O2 Parameters below as of 17 Jul 2024 17:25  Patient On (Oxygen Delivery Method): room air              07-16 @ 07:01 - 07-17 @ 07:00  --------------------------------------------------------  IN: 1820 mL / OUT: 1650 mL / NET: 170 mL    07-17 @ 07:01 - 07-17 @ 18:46  --------------------------------------------------------  IN: 0 mL / OUT: 775 mL / NET: -775 mL      CAPILLARY BLOOD GLUCOSE          PHYSICAL EXAM:  GEN: Male in NAD on RA  HEENT: NC/AT, MMM  CV: RRR, nml S1S2, no murmurs  PULM: nml effort, CTAB  ABD: Soft, mild distended, NABS, non-tender  NEURO  A/O x3, moving all extremities, Sensation intact  L leg dressing c/d/i. 5/5 in plantarflex/ext b/l  PSYCH: Appropriate    MEDICATIONS:  MEDICATIONS  (STANDING):  acetaminophen     Tablet .. 1000 milliGRAM(s) Oral every 8 hours  amLODIPine   Tablet 10 milliGRAM(s) Oral daily  atenolol  Tablet 25 milliGRAM(s) Oral daily  atorvastatin 40 milliGRAM(s) Oral at bedtime  cholecalciferol 2000 Unit(s) Oral daily  enoxaparin Injectable 40 milliGRAM(s) SubCutaneous every 24 hours  lactulose Syrup 20 Gram(s) Oral daily  polyethylene glycol 3350 17 Gram(s) Oral daily  senna 1 Tablet(s) Oral daily  sodium chloride 0.9%. 1000 milliLiter(s) (120 mL/Hr) IV Continuous <Continuous>  tamsulosin 0.4 milliGRAM(s) Oral at bedtime    MEDICATIONS  (PRN):  ALPRAZolam 0.5 milliGRAM(s) Oral daily PRN anxiety  bisacodyl 5 milliGRAM(s) Oral every 12 hours PRN Constipation  HYDROmorphone  Injectable 0.5 milliGRAM(s) IV Push every 4 hours PRN breakthrough pain on floor  oxyCODONE    IR 10 milliGRAM(s) Oral every 4 hours PRN Severe Pain (7 - 10)  oxyCODONE    IR 5 milliGRAM(s) Oral every 4 hours PRN Moderate Pain (4 - 6)      ALLERGIES:  Allergies    No Known Allergies    Intolerances        LABS:                        14.8   8.79  )-----------( 369      ( 17 Jul 2024 05:30 )             42.8     07-17    128<L>  |  94<L>  |  26<H>  ----------------------------<  133<H>  4.2   |  21<L>  |  0.76    Ca    8.8      17 Jul 2024 05:30        Urinalysis Basic - ( 17 Jul 2024 05:30 )    Color: x / Appearance: x / SG: x / pH: x  Gluc: 133 mg/dL / Ketone: x  / Bili: x / Urobili: x   Blood: x / Protein: x / Nitrite: x   Leuk Esterase: x / RBC: x / WBC x   Sq Epi: x / Non Sq Epi: x / Bacteria: x        RADIOLOGY & ADDITIONAL TESTS: Reviewed.

## 2024-07-17 NOTE — PROGRESS NOTE ADULT - SUBJECTIVE AND OBJECTIVE BOX
Ortho Note    Subjective:  Pt comfortable without complaints, pain controlled with current pain medication regimen   Denies CP, SOB, N/V, numbness/tingling   Reviewed plan of care with patient at bedside  Patient reports last bm was 5 days ago , requesting oral laxatives over suppository.     Vital Signs Last 24 Hrs  T(C): --  T(F): --  HR: --  BP: --  BP(mean): --  RR: --  SpO2: --  AVSS    Objective:    Physical Exam:  General: Pt Alert and oriented, NAD  Pulses: +2 DP pulses,   Sensation: silt intact bilateral LE  Motor: EHL/FHL/TA/GS- firing bilateral lower extremities  -garvey           Plan of Care:  A/P: 79yMale  s/p LEFT Femur Pathological Fx Biopsy and Provisional ORIF on 7/16 by Dr. SOSA  - afebrile, wbcs 8.79  - Pain Control- tylenol 1000mg PO Q8h, Oxycodone 5-10mg PO Q4h prn moderate to severe pain   - DVT ppx: Lovenox 40mg SubQ Q24h   - PT, WBS: NWB LLE,  OOB to chair   - continue garvey  - appreciate urology recommendations   - appreciate medicine recs  - bowel regimen, Is use, PPI- lactulose ordered, suppository in 24 hours if no bm  -Dispo- possible return to OR next week, depending on results of the biopsy taken in OR 7-16     Ortho Pager [phone] Ortho Note    Subjective:  Pt comfortable without complaints, pain controlled with current pain medication regimen   Denies CP, SOB, N/V, numbness/tingling   Reviewed plan of care with patient at bedside  Patient reports last bm was 5 days ago , requesting oral laxatives over suppository.     Vital Signs Last 24 Hrs  T(C): --  T(F): --  HR: --  BP: --  BP(mean): --  RR: --  SpO2: --  AVSS    Objective:    Physical Exam:  General: Pt Alert and oriented, NAD  Pulses: +2 DP pulses,   Sensation: silt intact bilateral LE  Motor: EHL/FHL/TA/GS- firing bilateral lower extremities  -garvey           Plan of Care:  A/P: 79yMale  s/p LEFT Femur Pathological Fx Biopsy and Provisional ORIF on 7/16 by Dr. SOSA  - afebrile, wbcs 8.79  - Pain Control- tylenol 1000mg PO Q8h, Oxycodone 5-10mg PO Q4h prn moderate to severe pain   - DVT ppx: Lovenox 40mg SubQ Q24h   - PT, WBS: NWB LLE,  OOB to chair   - continue garvey  - appreciate urology recommendations   - appreciate medicine recs  - bowel regimen, Is use, PPI- lactulose ordered, suppository in 24 hours if no bm  -Dispo- possible return to OR next week, depending on results of the biopsy taken in OR 7-16     Ortho Pager [phone]  - normal saline bolus 500cc ordered

## 2024-07-17 NOTE — PROGRESS NOTE ADULT - ASSESSMENT
79M w HTN, HLD, Prostate CA s/p XRT w chronic L leg pain for months p/w fall after L leg gave out on him, found to have proximal femoral shaft fracture, s/p biopsy and provisional ORIF Dr. Shaw 7/16    #Post-op state - pain controlled. PPx: SQL. On bowel regimen and incentive spirometer   - tylenol 1g q8   - PRN: Oxycodone 5/10 q4 for mod/severe pain  #L Proximal femur fracture   - CT C/A/P wo evidence of metastatic disease. Enlarged prostate wo fiducials   - MR L femur: pathologic fracture w displacement - some soft tissue present suggestive of mass w permeative marrow changes extending to lesser trochanter and mid-distal 3rd of femoral diaphysis.    - bone scan - signal at femoral shaft    #HTN - home on atenolol 25mg daily, amlodipine 10mg daily, ramipril 10mg daily  #HLD - home on lipitor 40mg daily  #Leukocytosis - Resolved  #Vitamin D insufficiency - Suboptimal - borderline w 29.9   - increased supplementation to 2000 IU daily D3  #Hyponatremia - 128 from 131 today.     Plan  Still reports poor appetite. Tolerating fluids -pt will try ensure clears  Can give  cc  f/u biopsy results. Noted UPEP negative for monoclonal band  Continue PT    Agree pt has good functional status and is low risk for intermediate risk procedure    Co-management will continue to follow  Above d/w orthopedics

## 2024-07-17 NOTE — PHYSICAL THERAPY INITIAL EVALUATION ADULT - GENERAL OBSERVATIONS, REHAB EVAL
ARNALDO Hilario aware of intent to treat. Patient received semi-supine in NAD with +heplock +L leg dressing clean/dry/intact +SCD x 2

## 2024-07-17 NOTE — PHYSICAL THERAPY INITIAL EVALUATION ADULT - PERTINENT HX OF CURRENT PROBLEM, REHAB EVAL
Patient is an 79 year old male c/o L thigh pain s/p fall at home, found to have pathologic femoral shaft fracture.

## 2024-07-17 NOTE — PHYSICAL THERAPY INITIAL EVALUATION ADULT - ADDITIONAL COMMENTS
Patient reports he lives with wife in elevator apartment with 3 JANNET (ramp available if needed). PTA patient had been ambulating with SC ~1 week. Has walk in shower

## 2024-07-17 NOTE — PROGRESS NOTE ADULT - SUBJECTIVE AND OBJECTIVE BOX
Ortho Progress Note    Procedure: LEFT Femur Pathological Fx Biopsy and Provisional ORIF on 7/16  Surgeon: Dr. SHEILA Shaw    Pt comfortable without complaints, pain controlled. Reports pain much better controlled postop  Denies CP, SOB, N/V, numbness/tingling     Vital Signs Last 24 Hrs  T(C): 36.3 (07-17-24 @ 05:22), Max: 36.3 (07-17-24 @ 05:22)  T(F): 97.3 (07-17-24 @ 05:22), Max: 97.3 (07-17-24 @ 05:22)  HR: 59 (07-17-24 @ 05:22) (59 - 59)  BP: 132/76 (07-17-24 @ 05:22) (132/76 - 132/76)  BP(mean): --  RR: 18 (07-17-24 @ 05:22) (18 - 18)  SpO2: 97% (07-17-24 @ 05:22) (97% - 97%)    General: Pt Alert and oriented, NAD  Gauze/Tegaderm DSG C/D/I  Pulses: 2+ DP  Sensation: SILT grossly SPN/DPN/Saph/Jessica/Tib  Motor: 5/5 TA/GS/EHL, Quad/Psoas firing limited 2/2 pain    A/P: 79yMale s/p LEFT Femur Pathological Fx Biopsy and Provisional ORIF on 7/16 by Dr. SHEILA Shaw  - Stable  - Pain Control  - DVT ppx: LVX  - Post op abx: Ancef  - WBS: OOBTC only, NWB LLE  - Pending final pathology results before determination of fixation/reconstruction of L Femur

## 2024-07-17 NOTE — PROGRESS NOTE ADULT - SUBJECTIVE AND OBJECTIVE BOX
UROLOGY PROGRESS NOTE    SUBJECTIVE: Patient seen and examined bedside. Patient denies fevers/chills, HA/dizziness, nausea/vomiting, CP/SOB, and ab/flank pain. Not yet ambulatory. urinating via garvey and has not had bowel movement since surgery.    amLODIPine   Tablet 10 milliGRAM(s) Oral daily  atenolol  Tablet 25 milliGRAM(s) Oral daily  ceFAZolin   IVPB 2000 milliGRAM(s) IV Intermittent every 8 hours  enoxaparin Injectable 40 milliGRAM(s) SubCutaneous every 24 hours      Vital Signs Last 24 Hrs  T(C): 36.3 (17 Jul 2024 05:22), Max: 37.2 (16 Jul 2024 16:11)  T(F): 97.3 (17 Jul 2024 05:22), Max: 99 (16 Jul 2024 16:11)  HR: 59 (17 Jul 2024 05:22) (57 - 67)  BP: 132/76 (17 Jul 2024 05:22) (105/61 - 150/72)  BP(mean): 90 (16 Jul 2024 21:27) (78 - 99)  RR: 18 (17 Jul 2024 05:22) (16 - 29)  SpO2: 97% (17 Jul 2024 05:22) (94% - 100%)    Parameters below as of 17 Jul 2024 05:22  Patient On (Oxygen Delivery Method): room air      I&O's Detail    16 Jul 2024 07:01  -  17 Jul 2024 07:00  --------------------------------------------------------  IN:    Lactated Ringers: 600 mL    sodium chloride 0.9%: 720 mL    Sodium Chloride 0.9% Bolus: 500 mL  Total IN: 1820 mL    OUT:    Ureteral Catheter (mL): 1650 mL  Total OUT: 1650 mL    Total NET: 170 mL          PHYSICAL EXAM    General: NAD, resting comfortably in bed  C/V: NSR  Pulm: Nonlabored breathing, no respiratory distress on room air  Abd: soft, ND/NT, no rebound tenderness, no guarding, no flank TTP  : garvey draining clear yellow urine.  Extrem: WWP, no edema, SCDs in place        LABS:                        14.8   8.79  )-----------( 369      ( 17 Jul 2024 05:30 )             42.8     07-16    131<L>  |  95<L>  |  25<H>  ----------------------------<  90  4.4   |  25  |  0.80    Ca    8.5      16 Jul 2024 14:40        Urinalysis Basic - ( 16 Jul 2024 14:40 )    Color: x / Appearance: x / SG: x / pH: x  Gluc: 90 mg/dL / Ketone: x  / Bili: x / Urobili: x   Blood: x / Protein: x / Nitrite: x   Leuk Esterase: x / RBC: x / WBC x   Sq Epi: x / Non Sq Epi: x / Bacteria: x        CULTURES:          RADIOLOGY & ADDITIONAL STUDIES:

## 2024-07-17 NOTE — PHYSICAL THERAPY INITIAL EVALUATION ADULT - GAIT DEVIATIONS NOTED, PT EVAL
patient with unsteady gait, initially retropulsive, verbal/visual cues for sequencing, unable to fully hop to step- performed shuffling steps, able to maintain WB status with verbal cues/decreased nelsy/decreased step length

## 2024-07-18 LAB
ANION GAP SERPL CALC-SCNC: 6 MMOL/L — SIGNIFICANT CHANGE UP (ref 5–17)
BUN SERPL-MCNC: 24 MG/DL — HIGH (ref 7–23)
CALCIUM SERPL-MCNC: 8.6 MG/DL — SIGNIFICANT CHANGE UP (ref 8.4–10.5)
CHLORIDE SERPL-SCNC: 102 MMOL/L — SIGNIFICANT CHANGE UP (ref 96–108)
CO2 SERPL-SCNC: 25 MMOL/L — SIGNIFICANT CHANGE UP (ref 22–31)
CREAT SERPL-MCNC: 0.82 MG/DL — SIGNIFICANT CHANGE UP (ref 0.5–1.3)
EGFR: 88 ML/MIN/1.73M2 — SIGNIFICANT CHANGE UP
GLUCOSE SERPL-MCNC: 104 MG/DL — HIGH (ref 70–99)
HCT VFR BLD CALC: 39.4 % — SIGNIFICANT CHANGE UP (ref 39–50)
HGB BLD-MCNC: 13.1 G/DL — SIGNIFICANT CHANGE UP (ref 13–17)
MCHC RBC-ENTMCNC: 30.7 PG — SIGNIFICANT CHANGE UP (ref 27–34)
MCHC RBC-ENTMCNC: 33.2 GM/DL — SIGNIFICANT CHANGE UP (ref 32–36)
MCV RBC AUTO: 92.3 FL — SIGNIFICANT CHANGE UP (ref 80–100)
NRBC # BLD: 0 /100 WBCS — SIGNIFICANT CHANGE UP (ref 0–0)
PLATELET # BLD AUTO: 355 K/UL — SIGNIFICANT CHANGE UP (ref 150–400)
POTASSIUM SERPL-MCNC: 4.9 MMOL/L — SIGNIFICANT CHANGE UP (ref 3.5–5.3)
POTASSIUM SERPL-SCNC: 4.9 MMOL/L — SIGNIFICANT CHANGE UP (ref 3.5–5.3)
RBC # BLD: 4.27 M/UL — SIGNIFICANT CHANGE UP (ref 4.2–5.8)
RBC # FLD: 13.3 % — SIGNIFICANT CHANGE UP (ref 10.3–14.5)
SODIUM SERPL-SCNC: 133 MMOL/L — LOW (ref 135–145)
WBC # BLD: 10.97 K/UL — HIGH (ref 3.8–10.5)
WBC # FLD AUTO: 10.97 K/UL — HIGH (ref 3.8–10.5)

## 2024-07-18 PROCEDURE — 99232 SBSQ HOSP IP/OBS MODERATE 35: CPT

## 2024-07-18 PROCEDURE — 99233 SBSQ HOSP IP/OBS HIGH 50: CPT

## 2024-07-18 RX ORDER — ASPIRIN 325 MG
10 TABLET ORAL DAILY
Refills: 0 | Status: DISCONTINUED | OUTPATIENT
Start: 2024-07-18 | End: 2024-07-29

## 2024-07-18 RX ADMIN — Medication 17 GRAM(S): at 12:15

## 2024-07-18 RX ADMIN — Medication 1000 MILLIGRAM(S): at 14:57

## 2024-07-18 RX ADMIN — Medication 2000 UNIT(S): at 12:16

## 2024-07-18 RX ADMIN — Medication 1000 MILLIGRAM(S): at 22:01

## 2024-07-18 RX ADMIN — ATORVASTATIN CALCIUM 40 MILLIGRAM(S): 40 TABLET, FILM COATED ORAL at 22:01

## 2024-07-18 RX ADMIN — OXYCODONE HYDROCHLORIDE 5 MILLIGRAM(S): 30 TABLET ORAL at 12:15

## 2024-07-18 RX ADMIN — Medication 1000 MILLIGRAM(S): at 05:31

## 2024-07-18 RX ADMIN — Medication 1000 MILLIGRAM(S): at 23:01

## 2024-07-18 RX ADMIN — SENNOSIDES 1 TABLET(S): 8.6 TABLET ORAL at 12:15

## 2024-07-18 RX ADMIN — Medication 0.5 MILLIGRAM(S): at 20:48

## 2024-07-18 RX ADMIN — OXYCODONE HYDROCHLORIDE 5 MILLIGRAM(S): 30 TABLET ORAL at 18:20

## 2024-07-18 RX ADMIN — Medication 0.5 MILLIGRAM(S): at 22:26

## 2024-07-18 RX ADMIN — Medication 0.4 MILLIGRAM(S): at 22:01

## 2024-07-18 RX ADMIN — ATENOLOL 25 MILLIGRAM(S): 100 TABLET ORAL at 05:32

## 2024-07-18 RX ADMIN — Medication 1000 MILLIGRAM(S): at 14:44

## 2024-07-18 RX ADMIN — Medication 1000 MILLIGRAM(S): at 08:40

## 2024-07-18 RX ADMIN — ENOXAPARIN SODIUM 40 MILLIGRAM(S): 120 INJECTION SUBCUTANEOUS at 05:32

## 2024-07-18 RX ADMIN — Medication 10 MILLIGRAM(S): at 14:44

## 2024-07-18 RX ADMIN — OXYCODONE HYDROCHLORIDE 5 MILLIGRAM(S): 30 TABLET ORAL at 14:40

## 2024-07-18 RX ADMIN — Medication 0.5 MILLIGRAM(S): at 22:01

## 2024-07-18 RX ADMIN — Medication 20 GRAM(S): at 12:15

## 2024-07-18 RX ADMIN — AMLODIPINE BESYLATE 10 MILLIGRAM(S): 2.5 TABLET ORAL at 05:31

## 2024-07-18 RX ADMIN — Medication 2 MILLIGRAM(S): at 08:40

## 2024-07-18 RX ADMIN — Medication 5 MILLIGRAM(S): at 12:15

## 2024-07-18 NOTE — OCCUPATIONAL THERAPY INITIAL EVALUATION ADULT - ADDITIONAL COMMENTS
Patient reports he lives with wife in elevator apartment with 3 JANNET (ramp available if needed). PTA patient had been ambulating with SC ~1 week. Has walk in shower and was independent with ADLs.

## 2024-07-18 NOTE — OCCUPATIONAL THERAPY INITIAL EVALUATION ADULT - MODIFIED CLINICAL TEST OF SENSORY INTEGRATION IN BALANCE TEST
Pt requiring min x2 person assist to take shuffle steps from bed to chair using RW, NWBing of LLE maintained throughout. Pt unable to hop on RLE 2/2 pain.

## 2024-07-18 NOTE — PROGRESS NOTE ADULT - SUBJECTIVE AND OBJECTIVE BOX
Ortho Note    Subjective:  Pt comfortable without complaints, pain controlled  Denies CP, SOB, N/V, numbness/tingling     Vital Signs Last 24 Hrs  T(C): 37 (07-18-24 @ 10:45), Max: 37 (07-18-24 @ 10:45)  T(F): 98.6 (07-18-24 @ 10:45), Max: 98.6 (07-18-24 @ 10:45)  HR: 63 (07-18-24 @ 10:45) (63 - 63)  BP: 118/64 (07-18-24 @ 13:15) (116/70 - 118/64)  BP(mean): --  RR: 18 (07-18-24 @ 10:45) (18 - 18)  SpO2: 93% (07-18-24 @ 10:45) (93% - 93%)  AVSS    Objective:    Physical Exam:  General: Pt Alert and oriented, NAD  - Dressing CDI   Pulses: +2 DP pulses,   Sensation: silt intact bilateral LE  Motor: EHL/FHL/TA/GS- firing bilateral lower extremities  -garvey             Plan of Care:  A/P: 79yMale  s/p LEFT Femur Pathological Fx Biopsy and Provisional ORIF on 7/16 by Dr. SOSA  - afebrile, wbcs 10.97  - Pain Control- tylenol 1000mg PO Q8h, Oxycodone 5-10mg PO Q4h prn moderate to severe pain   - DVT ppx: Lovenox 40mg SubQ Q24h   - PT, WBS: NWB LLE,  OOB to chair   - continue garvey  - appreciate urology recommendations   - appreciate medicine recs  - awaiting pathology biopsy- results   - bowel regimen, Is use, PPI- fiber added, suppository ordered  -Dispo- possible return to OR next week, depending on results of the biopsy taken in OR 7-16     Ortho Pager [phone]     Ortho Note    Subjective:  Pt comfortable without complaints, pain controlled with current pain medication regimen   Denies CP, SOB, N/V, numbness/tingling   Reviewed plan of care with patient at bedside  Patient reports last bm was 6 days ago , fiber and suppository ordered       Vital Signs Last 24 Hrs  T(C): 37 (07-18-24 @ 10:45), Max: 37 (07-18-24 @ 10:45)  T(F): 98.6 (07-18-24 @ 10:45), Max: 98.6 (07-18-24 @ 10:45)  HR: 63 (07-18-24 @ 10:45) (63 - 63)  BP: 118/64 (07-18-24 @ 13:15) (116/70 - 118/64)  BP(mean): --  RR: 18 (07-18-24 @ 10:45) (18 - 18)  SpO2: 93% (07-18-24 @ 10:45) (93% - 93%)  AVSS    Objective:    Physical Exam:  General: Pt Alert and oriented, NAD  - Dressing CDI   Pulses: +2 DP pulses,   Sensation: silt intact bilateral LE  Motor: EHL/FHL/TA/GS- firing bilateral lower extremities  -garvey             Plan of Care:  A/P: 79yMale  s/p LEFT Femur Pathological Fx Biopsy and Provisional ORIF on 7/16 by Dr. SOSA  - afebrile, wbcs 10.97  - Pain Control- tylenol 1000mg PO Q8h, Oxycodone 5-10mg PO Q4h prn moderate to severe pain   - DVT ppx: Lovenox 40mg SubQ Q24h   - PT, WBS: NWB LLE,  OOB to chair   - continue garvey  - appreciate urology recommendations   - appreciate medicine recs  - awaiting pathology biopsy- No update on results 7-18  - bowel regimen, Is use, PPI- fiber added, suppository ordered  -Dispo- possible return to OR next week, depending on results of the biopsy taken in OR 7-16     Ortho Pager [phone]

## 2024-07-18 NOTE — OCCUPATIONAL THERAPY INITIAL EVALUATION ADULT - LEVEL OF INDEPENDENCE: DRESS LOWER BODY, OT EVAL
mod A to don R sock semi-supine in bed, max A to don L/moderate assist (50% patients effort)/maximum assist (25% patients effort)

## 2024-07-18 NOTE — OCCUPATIONAL THERAPY INITIAL EVALUATION ADULT - GENERAL OBSERVATIONS, REHAB EVAL
Pt received semi-supine in bed, +heplock, +LLE dressing C/D/I, in NAD and agreeable to OT. Cleared by ARNALDO Thomson to see.

## 2024-07-18 NOTE — PROGRESS NOTE ADULT - ASSESSMENT
79M w HTN, HLD, Prostate CA s/p XRT w chronic L leg pain for months p/w fall after L leg gave out on him, found to have proximal femoral shaft fracture, s/p biopsy and provisional ORIF Dr. Shaw 7/16    #Constipation - no BM in 7d.    #Post-op state - pain controlled. PPx: SQL. On bowel regimen and incentive spirometer   - tylenol 1g q8   - PRN: Oxycodone 5/10 q4 for mod/severe pain  #L Proximal femur fracture   - CT C/A/P wo evidence of metastatic disease. Enlarged prostate wo fiducials   - MR L femur: pathologic fracture w displacement - some soft tissue present suggestive of mass w permeative marrow changes extending to lesser trochanter and mid-distal 3rd of femoral diaphysis.    - bone scan - signal at femoral shaft    #HTN - home on atenolol 25mg daily, amlodipine 10mg daily, ramipril 10mg daily  #HLD - home on lipitor 40mg daily  #Leukocytosis - 11 from 8.8. Non toxic appearing. follow clinically  #Vitamin D insufficiency - Suboptimal - borderline w 29.9   - increased supplementation to 2000 IU daily D3  #Hyponatremia - 133 from 128 from 131 today.     Plan  Abdominal exam is benign. Pt passing flatus. Discussed options for management including dulcolax suppository, enema, and movantik. Pt amenable to trying suppository then enema if still no BM.     f/u biopsy results. Noted SPEP, UPEP negative for monoclonal band  Continue PT    Agree pt has good functional status and is low risk for intermediate risk procedure    Co-management will continue to follow  Above d/w orthopedics

## 2024-07-18 NOTE — PROGRESS NOTE ADULT - SUBJECTIVE AND OBJECTIVE BOX
Ortho Progress Note    Procedure: LEFT Femur Pathological Fx Biopsy and Provisional ORIF on 7/16  Surgeon: Dr. SHEILA Shaw    Pt comfortable without complaints, pain controlled. Still awaiting BM  Denies CP, SOB, N/V, numbness/tingling     Vital Signs Last 24 Hrs  T(C): 36.5 (18 Jul 2024 05:10), Max: 37.1 (17 Jul 2024 17:25)  T(F): 97.7 (18 Jul 2024 05:10), Max: 98.7 (17 Jul 2024 17:25)  HR: 76 (18 Jul 2024 05:10) (65 - 82)  BP: 135/69 (18 Jul 2024 05:10) (112/77 - 135/69)  BP(mean): --  RR: 16 (18 Jul 2024 05:10) (16 - 18)  SpO2: 100% (18 Jul 2024 05:10) (96% - 100%)    Parameters below as of 18 Jul 2024 05:10  Patient On (Oxygen Delivery Method): room air    General: Pt Alert and oriented, NAD  Gauze/Tegaderm DSG C/D/I  Pulses: 2+ DP  Sensation: SILT grossly SPN/DPN/Saph/Jessica/Tib  Motor: 5/5 TA/GS/EHL, Quad/Psoas firing limited 2/2 pain    A/P: 79yMale s/p LEFT Femur Pathological Fx Biopsy and Provisional ORIF on 7/16 by Dr. SHEILA Shaw  - Stable  - Pain Control  - DVT ppx: LVX  - Post op abx: Ancef  - WBS: OOBTC only, NWB LLE  - Pending final pathology results before determination of fixation/reconstruction of L Femur  - Suppository vs. Enema today

## 2024-07-18 NOTE — OCCUPATIONAL THERAPY INITIAL EVALUATION ADULT - DIAGNOSIS, OT EVAL
Pt admitted for Femur Pathological Fx Biopsy and Provisional ORIF (7/16) presents with impaired balance, generalized weakness, and decreased activity tolerance impacting overall ease of completing ADLs and functional mobility tasks at prior level of function.

## 2024-07-18 NOTE — PROGRESS NOTE ADULT - SUBJECTIVE AND OBJECTIVE BOX
INTERVAL HPI/OVERNIGHT EVENTS: sravanthi o/n    SUBJECTIVE: Patient seen and examined at bedside.   Pt reports not taking opioids due to constipation - acknowledgees his L leg has pain and feels heavy - specifically L hip flexion.   passing flatus and eating a bit more - however he has not had a BM in 7 days. No LH/dizziness, chest pain, dyspnea. Franklin remains in place. No fever, chills, sweats.    OBJECTIVE:    VITAL SIGNS:  ICU Vital Signs Last 24 Hrs  T(C): 37 (18 Jul 2024 10:45), Max: 37.1 (17 Jul 2024 17:25)  T(F): 98.6 (18 Jul 2024 10:45), Max: 98.7 (17 Jul 2024 17:25)  HR: 63 (18 Jul 2024 10:45) (63 - 82)  BP: 118/64 (18 Jul 2024 13:15) (116/70 - 135/69)  BP(mean): --  ABP: --  ABP(mean): --  RR: 18 (18 Jul 2024 10:45) (16 - 18)  SpO2: 93% (18 Jul 2024 10:45) (93% - 100%)    O2 Parameters below as of 18 Jul 2024 10:45  Patient On (Oxygen Delivery Method): room air              07-17 @ 07:01 - 07-18 @ 07:00  --------------------------------------------------------  IN: 0 mL / OUT: 775 mL / NET: -775 mL    07-18 @ 07:01  -  07-18 @ 16:20  --------------------------------------------------------  IN: 1200 mL / OUT: 675 mL / NET: 525 mL      CAPILLARY BLOOD GLUCOSE          PHYSICAL EXAM:  GEN: Male in NAD on RA  HEENT: NC/AT, MMM  CV: RRR, nml S1S2, no murmurs  PULM: nml effort, CTAB  ABD: Soft, mild distended, NABS, non-tender  : Franklin in place  NEURO  A/O x3, moving all extremities, Sensation intact  L leg dressing c/d/i. 5/5 in plantarflex/ext b/l  PSYCH: Appropriate    MEDICATIONS:  MEDICATIONS  (STANDING):  acetaminophen     Tablet .. 1000 milliGRAM(s) Oral every 8 hours  amLODIPine   Tablet 10 milliGRAM(s) Oral daily  atenolol  Tablet 25 milliGRAM(s) Oral daily  atorvastatin 40 milliGRAM(s) Oral at bedtime  bisacodyl Suppository 10 milliGRAM(s) Rectal daily  cholecalciferol 2000 Unit(s) Oral daily  enoxaparin Injectable 40 milliGRAM(s) SubCutaneous every 24 hours  lactulose Syrup 20 Gram(s) Oral daily  polyethylene glycol 3350 17 Gram(s) Oral daily  senna 1 Tablet(s) Oral daily  sodium chloride 0.9%. 1000 milliLiter(s) (120 mL/Hr) IV Continuous <Continuous>  tamsulosin 0.4 milliGRAM(s) Oral at bedtime    MEDICATIONS  (PRN):  ALPRAZolam 0.5 milliGRAM(s) Oral daily PRN anxiety  bisacodyl 5 milliGRAM(s) Oral every 12 hours PRN Constipation  HYDROmorphone  Injectable 0.5 milliGRAM(s) IV Push every 4 hours PRN breakthrough pain on floor  oxyCODONE    IR 10 milliGRAM(s) Oral every 4 hours PRN Severe Pain (7 - 10)  oxyCODONE    IR 5 milliGRAM(s) Oral every 4 hours PRN Moderate Pain (4 - 6)      ALLERGIES:  Allergies    No Known Allergies    Intolerances        LABS:                        13.1   10.97 )-----------( 355      ( 18 Jul 2024 05:30 )             39.4     07-18    133<L>  |  102  |  24<H>  ----------------------------<  104<H>  4.9   |  25  |  0.82    Ca    8.6      18 Jul 2024 05:30        Urinalysis Basic - ( 18 Jul 2024 05:30 )    Color: x / Appearance: x / SG: x / pH: x  Gluc: 104 mg/dL / Ketone: x  / Bili: x / Urobili: x   Blood: x / Protein: x / Nitrite: x   Leuk Esterase: x / RBC: x / WBC x   Sq Epi: x / Non Sq Epi: x / Bacteria: x        RADIOLOGY & ADDITIONAL TESTS: Reviewed.

## 2024-07-19 LAB
ANION GAP SERPL CALC-SCNC: 8 MMOL/L — SIGNIFICANT CHANGE UP (ref 5–17)
BUN SERPL-MCNC: 19 MG/DL — SIGNIFICANT CHANGE UP (ref 7–23)
CALCIUM SERPL-MCNC: 8.1 MG/DL — LOW (ref 8.4–10.5)
CHLORIDE SERPL-SCNC: 100 MMOL/L — SIGNIFICANT CHANGE UP (ref 96–108)
CO2 SERPL-SCNC: 25 MMOL/L — SIGNIFICANT CHANGE UP (ref 22–31)
CREAT SERPL-MCNC: 0.79 MG/DL — SIGNIFICANT CHANGE UP (ref 0.5–1.3)
EGFR: 89 ML/MIN/1.73M2 — SIGNIFICANT CHANGE UP
GLUCOSE SERPL-MCNC: 85 MG/DL — SIGNIFICANT CHANGE UP (ref 70–99)
HCT VFR BLD CALC: 34.6 % — LOW (ref 39–50)
HGB BLD-MCNC: 12.3 G/DL — LOW (ref 13–17)
MCHC RBC-ENTMCNC: 31.4 PG — SIGNIFICANT CHANGE UP (ref 27–34)
MCHC RBC-ENTMCNC: 35.5 GM/DL — SIGNIFICANT CHANGE UP (ref 32–36)
MCV RBC AUTO: 88.3 FL — SIGNIFICANT CHANGE UP (ref 80–100)
NRBC # BLD: 0 /100 WBCS — SIGNIFICANT CHANGE UP (ref 0–0)
PLATELET # BLD AUTO: 320 K/UL — SIGNIFICANT CHANGE UP (ref 150–400)
POTASSIUM SERPL-MCNC: 4.1 MMOL/L — SIGNIFICANT CHANGE UP (ref 3.5–5.3)
POTASSIUM SERPL-SCNC: 4.1 MMOL/L — SIGNIFICANT CHANGE UP (ref 3.5–5.3)
RBC # BLD: 3.92 M/UL — LOW (ref 4.2–5.8)
RBC # FLD: 13.5 % — SIGNIFICANT CHANGE UP (ref 10.3–14.5)
SODIUM SERPL-SCNC: 133 MMOL/L — LOW (ref 135–145)
WBC # BLD: 7.86 K/UL — SIGNIFICANT CHANGE UP (ref 3.8–10.5)
WBC # FLD AUTO: 7.86 K/UL — SIGNIFICANT CHANGE UP (ref 3.8–10.5)

## 2024-07-19 PROCEDURE — 99232 SBSQ HOSP IP/OBS MODERATE 35: CPT

## 2024-07-19 RX ORDER — HYDROMORPHONE HCL IN 0.9% NACL 0.2 MG/ML
0.5 PLASTIC BAG, INJECTION (ML) INTRAVENOUS EVERY 4 HOURS
Refills: 0 | Status: DISCONTINUED | OUTPATIENT
Start: 2024-07-19 | End: 2024-07-23

## 2024-07-19 RX ORDER — OXYCODONE HYDROCHLORIDE 30 MG/1
10 TABLET ORAL EVERY 4 HOURS
Refills: 0 | Status: DISCONTINUED | OUTPATIENT
Start: 2024-07-19 | End: 2024-07-26

## 2024-07-19 RX ORDER — OXYCODONE HYDROCHLORIDE 30 MG/1
5 TABLET ORAL EVERY 4 HOURS
Refills: 0 | Status: DISCONTINUED | OUTPATIENT
Start: 2024-07-19 | End: 2024-07-26

## 2024-07-19 RX ORDER — ALPRAZOLAM 0.5 MG
0.5 TABLET ORAL DAILY
Refills: 0 | Status: DISCONTINUED | OUTPATIENT
Start: 2024-07-19 | End: 2024-07-20

## 2024-07-19 RX ADMIN — OXYCODONE HYDROCHLORIDE 10 MILLIGRAM(S): 30 TABLET ORAL at 04:06

## 2024-07-19 RX ADMIN — OXYCODONE HYDROCHLORIDE 10 MILLIGRAM(S): 30 TABLET ORAL at 12:27

## 2024-07-19 RX ADMIN — ATORVASTATIN CALCIUM 40 MILLIGRAM(S): 40 TABLET, FILM COATED ORAL at 22:16

## 2024-07-19 RX ADMIN — OXYCODONE HYDROCHLORIDE 10 MILLIGRAM(S): 30 TABLET ORAL at 03:06

## 2024-07-19 RX ADMIN — ENOXAPARIN SODIUM 40 MILLIGRAM(S): 120 INJECTION SUBCUTANEOUS at 05:44

## 2024-07-19 RX ADMIN — Medication 2000 UNIT(S): at 11:51

## 2024-07-19 RX ADMIN — OXYCODONE HYDROCHLORIDE 10 MILLIGRAM(S): 30 TABLET ORAL at 23:16

## 2024-07-19 RX ADMIN — Medication 0.4 MILLIGRAM(S): at 22:16

## 2024-07-19 RX ADMIN — Medication 0.5 MILLIGRAM(S): at 23:48

## 2024-07-19 RX ADMIN — Medication 1000 MILLIGRAM(S): at 22:16

## 2024-07-19 RX ADMIN — SENNOSIDES 1 TABLET(S): 8.6 TABLET ORAL at 11:54

## 2024-07-19 RX ADMIN — Medication 1000 MILLIGRAM(S): at 14:18

## 2024-07-19 RX ADMIN — Medication 1000 MILLIGRAM(S): at 06:44

## 2024-07-19 RX ADMIN — OXYCODONE HYDROCHLORIDE 10 MILLIGRAM(S): 30 TABLET ORAL at 18:40

## 2024-07-19 RX ADMIN — OXYCODONE HYDROCHLORIDE 10 MILLIGRAM(S): 30 TABLET ORAL at 18:03

## 2024-07-19 RX ADMIN — Medication 17 GRAM(S): at 11:51

## 2024-07-19 RX ADMIN — OXYCODONE HYDROCHLORIDE 10 MILLIGRAM(S): 30 TABLET ORAL at 13:01

## 2024-07-19 RX ADMIN — Medication 1000 MILLIGRAM(S): at 05:44

## 2024-07-19 RX ADMIN — OXYCODONE HYDROCHLORIDE 10 MILLIGRAM(S): 30 TABLET ORAL at 22:16

## 2024-07-19 RX ADMIN — AMLODIPINE BESYLATE 10 MILLIGRAM(S): 2.5 TABLET ORAL at 05:43

## 2024-07-19 NOTE — PROGRESS NOTE ADULT - ASSESSMENT
79M w HTN, HLD, Prostate CA s/p XRT w chronic L leg pain for months p/w fall after L leg gave out on him, found to have proximal femoral shaft fracture, s/p biopsy and provisional ORIF Dr. Shaw 7/16    #Constipation - no BM in 7d.    #Post-op state - pain controlled. PPx: SQL. On bowel regimen and incentive spirometer   - tylenol 1g q8   - PRN: Oxycodone 5/10 q4 for mod/severe pain  #L Proximal femur fracture   - CT C/A/P wo evidence of metastatic disease. Enlarged prostate wo fiducials   - MR L femur: pathologic fracture w displacement - some soft tissue present suggestive of mass w permeative marrow changes extending to lesser trochanter and mid-distal 3rd of femoral diaphysis.    - bone scan - signal at femoral shaft    #HTN - home on atenolol 25mg daily, amlodipine 10mg daily, ramipril 10mg daily  #HLD - home on lipitor 40mg daily  #Leukocytosis - 11 from 8.8. Non toxic appearing. follow clinically  #Vitamin D insufficiency - Suboptimal - borderline w 29.9   - increased supplementation to 2000 IU daily D3  #Hyponatremia - 133 from 128 from 131 today.     Plan  Abdominal exam is benign. Pt passing flatus. Discussed options for management including dulcolax suppository, enema, and movantik.   Patient passed bm x 1 yesterday 7/18.   TOV today, please monitor for urine retention.  f/u biopsy results - not update 7/19.     Noted SPEP, UPEP negative for monoclonal band  Continue PT    Co-management will continue to follow  Above d/w orthopedics 79M w HTN, HLD, Prostate CA s/p XRT w chronic L leg pain for months p/w fall after L leg gave out on him, found to have proximal femoral shaft fracture, s/p biopsy and provisional ORIF Dr. Shaw 7/16    #Constipation - resolving. had BM yesterday.     #Post-op state - pain controlled. PPx: SQL. On bowel regimen and incentive spirometer   - tylenol 1g q8   - PRN: Oxycodone 5/10 q4 for mod/severe pain    #Anemia  Hb drop to 12.3 today from 13 yesterday.   possible in part dilutional given he is on fluids. Also blood loss anemia in setting of surgery.     #L Proximal femur fracture   - CT C/A/P wo evidence of metastatic disease. Enlarged prostate wo fiducials   - MR L femur: pathologic fracture w displacement - some soft tissue present suggestive of mass w permeative marrow changes extending to lesser trochanter and mid-distal 3rd of femoral diaphysis.    - bone scan - signal at femoral shaft    #HTN - home on atenolol 25mg daily, amlodipine 10mg daily, ramipril 10mg daily  #HLD - home on lipitor 40mg daily  #Leukocytosis - 11 from 8.8. Non toxic appearing. follow clinically  #Vitamin D insufficiency - Suboptimal - borderline w 29.9   - increased supplementation to 2000 IU daily D3  #Hyponatremia - 133 from 128 from 131 today.     Plan  Abdominal exam is benign. Pt passing flatus. Discussed options for management including dulcolax suppository, enema, and movantik.   Patient passed bm x 1 yesterday 7/18.   TOV today, please monitor for urine retention.  f/u biopsy results - not update 7/19.   Monitor for bleeding (Hb drop).    Noted SPEP, UPEP negative for monoclonal band  Continue PT    Co-management will continue to follow  Above d/w orthopedics

## 2024-07-19 NOTE — PROGRESS NOTE ADULT - SUBJECTIVE AND OBJECTIVE BOX
Patient is a 79y old  Male who presents with a chief complaint of CLOSED FRACTURE OF SHAFTOF LEFT FEMUR     (15 Jul 2024 13:53)    INTERVAL EVENTS:      SUBJECTIVE:      MEDICATIONS:  MEDICATIONS  (STANDING):  acetaminophen     Tablet .. 1000 milliGRAM(s) Oral every 8 hours  amLODIPine   Tablet 10 milliGRAM(s) Oral daily  atenolol  Tablet 25 milliGRAM(s) Oral daily  atorvastatin 40 milliGRAM(s) Oral at bedtime  bisacodyl Suppository 10 milliGRAM(s) Rectal daily  cholecalciferol 2000 Unit(s) Oral daily  enoxaparin Injectable 40 milliGRAM(s) SubCutaneous every 24 hours  lactulose Syrup 20 Gram(s) Oral daily  polyethylene glycol 3350 17 Gram(s) Oral daily  senna 1 Tablet(s) Oral daily  sodium chloride 0.9%. 1000 milliLiter(s) (120 mL/Hr) IV Continuous <Continuous>  tamsulosin 0.4 milliGRAM(s) Oral at bedtime    MEDICATIONS  (PRN):  ALPRAZolam 0.5 milliGRAM(s) Oral daily PRN anxiety  bisacodyl 5 milliGRAM(s) Oral every 12 hours PRN Constipation  HYDROmorphone  Injectable 0.5 milliGRAM(s) IV Push every 4 hours PRN breakthrough pain on floor  oxyCODONE    IR 10 milliGRAM(s) Oral every 4 hours PRN Severe Pain (7 - 10)  oxyCODONE    IR 5 milliGRAM(s) Oral every 4 hours PRN Moderate Pain (4 - 6)      Allergies    No Known Allergies    Intolerances        OBJECTIVE:  Vital Signs Last 24 Hrs  T(C): 36.4 (19 Jul 2024 09:07), Max: 36.7 (18 Jul 2024 20:24)  T(F): 97.6 (19 Jul 2024 09:07), Max: 98 (18 Jul 2024 20:24)  HR: 62 (19 Jul 2024 09:07) (61 - 77)  BP: 128/64 (19 Jul 2024 09:07) (114/67 - 128/64)  BP(mean): --  RR: 17 (19 Jul 2024 09:07) (17 - 18)  SpO2: 96% (19 Jul 2024 09:07) (95% - 98%)    Parameters below as of 19 Jul 2024 09:07  Patient On (Oxygen Delivery Method): room air      I&O's Summary    18 Jul 2024 07:01  -  19 Jul 2024 07:00  --------------------------------------------------------  IN: 1560 mL / OUT: 675 mL / NET: 885 mL    19 Jul 2024 07:01  -  19 Jul 2024 11:15  --------------------------------------------------------  IN: 0 mL / OUT: 1600 mL / NET: -1600 mL        PHYSICAL EXAM:      LABS:                        12.3   7.86  )-----------( 320      ( 19 Jul 2024 05:30 )             34.6     07-19    133<L>  |  100  |  19  ----------------------------<  85  4.1   |  25  |  0.79    Ca    8.1<L>      19 Jul 2024 05:30          CAPILLARY BLOOD GLUCOSE        Urinalysis Basic - ( 19 Jul 2024 05:30 )    Color: x / Appearance: x / SG: x / pH: x  Gluc: 85 mg/dL / Ketone: x  / Bili: x / Urobili: x   Blood: x / Protein: x / Nitrite: x   Leuk Esterase: x / RBC: x / WBC x   Sq Epi: x / Non Sq Epi: x / Bacteria: x        MICRODATA:    Culture - Surgical Swab (collected 16 Jul 2024 19:20)  Source: .Surgical Swab Left Pathologic Femur Fracture  Preliminary Report (18 Jul 2024 09:58):    No growth to date.        RADIOLOGY/OTHER STUDIES:   Patient is a 79y old  Male who presents with a chief complaint of CLOSED FRACTURE OF SHAFTOF LEFT FEMUR     (15 Jul 2024 13:53)    INTERVAL EVENTS:  passed trial of void. had bowel movement.    SUBJECTIVE:  no complaints reported today.    MEDICATIONS:  MEDICATIONS  (STANDING):  acetaminophen     Tablet .. 1000 milliGRAM(s) Oral every 8 hours  amLODIPine   Tablet 10 milliGRAM(s) Oral daily  atenolol  Tablet 25 milliGRAM(s) Oral daily  atorvastatin 40 milliGRAM(s) Oral at bedtime  bisacodyl Suppository 10 milliGRAM(s) Rectal daily  cholecalciferol 2000 Unit(s) Oral daily  enoxaparin Injectable 40 milliGRAM(s) SubCutaneous every 24 hours  lactulose Syrup 20 Gram(s) Oral daily  polyethylene glycol 3350 17 Gram(s) Oral daily  senna 1 Tablet(s) Oral daily  sodium chloride 0.9%. 1000 milliLiter(s) (120 mL/Hr) IV Continuous <Continuous>  tamsulosin 0.4 milliGRAM(s) Oral at bedtime    MEDICATIONS  (PRN):  ALPRAZolam 0.5 milliGRAM(s) Oral daily PRN anxiety  bisacodyl 5 milliGRAM(s) Oral every 12 hours PRN Constipation  HYDROmorphone  Injectable 0.5 milliGRAM(s) IV Push every 4 hours PRN breakthrough pain on floor  oxyCODONE    IR 10 milliGRAM(s) Oral every 4 hours PRN Severe Pain (7 - 10)  oxyCODONE    IR 5 milliGRAM(s) Oral every 4 hours PRN Moderate Pain (4 - 6)      Allergies    No Known Allergies    Intolerances        OBJECTIVE:  Vital Signs Last 24 Hrs  T(C): 36.4 (19 Jul 2024 09:07), Max: 36.7 (18 Jul 2024 20:24)  T(F): 97.6 (19 Jul 2024 09:07), Max: 98 (18 Jul 2024 20:24)  HR: 62 (19 Jul 2024 09:07) (61 - 77)  BP: 128/64 (19 Jul 2024 09:07) (114/67 - 128/64)  BP(mean): --  RR: 17 (19 Jul 2024 09:07) (17 - 18)  SpO2: 96% (19 Jul 2024 09:07) (95% - 98%)    Parameters below as of 19 Jul 2024 09:07  Patient On (Oxygen Delivery Method): room air      I&O's Summary    18 Jul 2024 07:01  -  19 Jul 2024 07:00  --------------------------------------------------------  IN: 1560 mL / OUT: 675 mL / NET: 885 mL    19 Jul 2024 07:01  -  19 Jul 2024 11:15  --------------------------------------------------------  IN: 0 mL / OUT: 1600 mL / NET: -1600 mL      PHYSICAL EXAM:  General: NAD  HEENT: NC/AT; PERRL, clear conjunctiva  Neck: supple  Respiratory: CTA b/l  Cardiovascular: +S1/S2; RRR  Abdomen: soft, NT/ND; +BS x4  Extremities: left leg dressing, clean, dry  Skin: normal color and turgor; no rash  Neurological: AAO x 3. no FND.  Psychiatry: appropriate mood/affect       LABS:                        12.3   7.86  )-----------( 320      ( 19 Jul 2024 05:30 )             34.6     07-19    133<L>  |  100  |  19  ----------------------------<  85  4.1   |  25  |  0.79    Ca    8.1<L>      19 Jul 2024 05:30          CAPILLARY BLOOD GLUCOSE        Urinalysis Basic - ( 19 Jul 2024 05:30 )    Color: x / Appearance: x / SG: x / pH: x  Gluc: 85 mg/dL / Ketone: x  / Bili: x / Urobili: x   Blood: x / Protein: x / Nitrite: x   Leuk Esterase: x / RBC: x / WBC x   Sq Epi: x / Non Sq Epi: x / Bacteria: x        MICRODATA:    Culture - Surgical Swab (collected 16 Jul 2024 19:20)  Source: .Surgical Swab Left Pathologic Femur Fracture  Preliminary Report (18 Jul 2024 09:58):    No growth to date.        RADIOLOGY/OTHER STUDIES:

## 2024-07-19 NOTE — PROGRESS NOTE ADULT - SUBJECTIVE AND OBJECTIVE BOX
Ortho Progress Note    Procedure: LEFT Femur Pathological Fx Biopsy and Provisional ORIF on 7/16  Surgeon: Dr. SHEILA Shaw    Pt comfortable without complaints, pain controlled. Finally had BM yesterday  Denies CP, SOB, N/V, numbness/tingling     Vital Signs Last 24 Hrs  T(C): 36.5 (18 Jul 2024 05:10), Max: 37.1 (17 Jul 2024 17:25)  T(F): 97.7 (18 Jul 2024 05:10), Max: 98.7 (17 Jul 2024 17:25)  HR: 76 (18 Jul 2024 05:10) (65 - 82)  BP: 135/69 (18 Jul 2024 05:10) (112/77 - 135/69)  BP(mean): --  RR: 16 (18 Jul 2024 05:10) (16 - 18)  SpO2: 100% (18 Jul 2024 05:10) (96% - 100%)    Parameters below as of 18 Jul 2024 05:10  Patient On (Oxygen Delivery Method): room air    General: Pt Alert and oriented, NAD  Gauze/Tegaderm DSG C/D/I  Pulses: 2+ DP  Sensation: SILT grossly SPN/DPN/Saph/Jessica/Tib  Motor: 5/5 TA/GS/EHL, Quad/Psoas firing limited 2/2 pain    A/P: 79yMale s/p LEFT Femur Pathological Fx Biopsy and Provisional ORIF on 7/16 by Dr. SHEILA Shaw  - Stable  - Pain Control  - DVT ppx: LVX  - Post op abx: Ancef  - WBS: OOBTC only, NWB LLE  - Pending final pathology results before determination of fixation/reconstruction of L Femur

## 2024-07-19 NOTE — PROGRESS NOTE ADULT - SUBJECTIVE AND OBJECTIVE BOX
Ortho Note    Subjective:  Pt comfortable without complaints, pain controlled with current pain medication regimen when he receives the oxycodone PO   Denies CP, SOB, N/V, numbness/tingling   Patient reports last bm was 7-18   Reviewed plan of care with patient at bedside      Vital Signs Last 24 Hrs    T(C): 36.4 (07-19-24 @ 09:07), Max: 36.4 (07-19-24 @ 09:07)  T(F): 97.6 (07-19-24 @ 09:07), Max: 97.6 (07-19-24 @ 09:07)  HR: 62 (07-19-24 @ 09:07) (62 - 62)  BP: 128/64 (07-19-24 @ 09:07) (128/64 - 128/64)  BP(mean): --  RR: 17 (07-19-24 @ 09:07) (17 - 17)  SpO2: 96% (07-19-24 @ 09:07) (96% - 96%)  AVSS    Objective:      Physical Exam:  General: Pt Alert and oriented, NAD  - Left Hip Dressing CDI   Pulses: +2 DP pulses, wpw toes   Sensation: silt intact bilateral LE  Motor: EHL/FHL/TA/GS- firing bilateral lower extremities  -garvey           Plan of Care:  A/P: 79yMale s/p LEFT Femur Pathological Fx Biopsy and Provisional ORIF on 7/16 by Dr. SOSA  - afebrile, wbcs 7.86  - Pain Control- tylenol 1000mg PO Q8h, Oxycodone 5-10mg PO Q4h prn moderate to severe pain   - DVT ppx: Lovenox 40mg SubQ Q24h   - PT, WBS: NWB LLE,  OOB to chair   - DC garvey- TOV   - appreciate urology recommendations   - appreciate medicine recs  - awaiting pathology biopsy- No update on results 7-19  - bowel regimen, Is use, PPI- - last bm 7-  -Dispo- possible return to OR next week, depending on results of the biopsy taken in OR 7-16     Ortho Pager [phone]

## 2024-07-20 LAB
ANION GAP SERPL CALC-SCNC: 9 MMOL/L — SIGNIFICANT CHANGE UP (ref 5–17)
BUN SERPL-MCNC: 14 MG/DL — SIGNIFICANT CHANGE UP (ref 7–23)
CALCIUM SERPL-MCNC: 8.3 MG/DL — LOW (ref 8.4–10.5)
CHLORIDE SERPL-SCNC: 98 MMOL/L — SIGNIFICANT CHANGE UP (ref 96–108)
CO2 SERPL-SCNC: 25 MMOL/L — SIGNIFICANT CHANGE UP (ref 22–31)
CREAT SERPL-MCNC: 0.75 MG/DL — SIGNIFICANT CHANGE UP (ref 0.5–1.3)
EGFR: 91 ML/MIN/1.73M2 — SIGNIFICANT CHANGE UP
GLUCOSE SERPL-MCNC: 81 MG/DL — SIGNIFICANT CHANGE UP (ref 70–99)
HCT VFR BLD CALC: 35.9 % — LOW (ref 39–50)
HGB BLD-MCNC: 12.6 G/DL — LOW (ref 13–17)
MCHC RBC-ENTMCNC: 31.3 PG — SIGNIFICANT CHANGE UP (ref 27–34)
MCHC RBC-ENTMCNC: 35.1 GM/DL — SIGNIFICANT CHANGE UP (ref 32–36)
MCV RBC AUTO: 89.3 FL — SIGNIFICANT CHANGE UP (ref 80–100)
NRBC # BLD: 0 /100 WBCS — SIGNIFICANT CHANGE UP (ref 0–0)
PLATELET # BLD AUTO: 361 K/UL — SIGNIFICANT CHANGE UP (ref 150–400)
POTASSIUM SERPL-MCNC: 3.9 MMOL/L — SIGNIFICANT CHANGE UP (ref 3.5–5.3)
POTASSIUM SERPL-SCNC: 3.9 MMOL/L — SIGNIFICANT CHANGE UP (ref 3.5–5.3)
RBC # BLD: 4.02 M/UL — LOW (ref 4.2–5.8)
RBC # FLD: 13.2 % — SIGNIFICANT CHANGE UP (ref 10.3–14.5)
SODIUM SERPL-SCNC: 132 MMOL/L — LOW (ref 135–145)
WBC # BLD: 7.39 K/UL — SIGNIFICANT CHANGE UP (ref 3.8–10.5)
WBC # FLD AUTO: 7.39 K/UL — SIGNIFICANT CHANGE UP (ref 3.8–10.5)

## 2024-07-20 PROCEDURE — 99233 SBSQ HOSP IP/OBS HIGH 50: CPT

## 2024-07-20 RX ORDER — ALPRAZOLAM 0.5 MG
0.25 TABLET ORAL
Refills: 0 | Status: DISCONTINUED | OUTPATIENT
Start: 2024-07-20 | End: 2024-07-21

## 2024-07-20 RX ADMIN — Medication 1000 MILLIGRAM(S): at 13:24

## 2024-07-20 RX ADMIN — Medication 1000 MILLIGRAM(S): at 13:30

## 2024-07-20 RX ADMIN — OXYCODONE HYDROCHLORIDE 10 MILLIGRAM(S): 30 TABLET ORAL at 17:36

## 2024-07-20 RX ADMIN — AMLODIPINE BESYLATE 10 MILLIGRAM(S): 2.5 TABLET ORAL at 06:00

## 2024-07-20 RX ADMIN — Medication 0.5 MILLIGRAM(S): at 06:15

## 2024-07-20 RX ADMIN — Medication 2000 UNIT(S): at 09:10

## 2024-07-20 RX ADMIN — SENNOSIDES 1 TABLET(S): 8.6 TABLET ORAL at 09:10

## 2024-07-20 RX ADMIN — ATORVASTATIN CALCIUM 40 MILLIGRAM(S): 40 TABLET, FILM COATED ORAL at 22:08

## 2024-07-20 RX ADMIN — ENOXAPARIN SODIUM 40 MILLIGRAM(S): 120 INJECTION SUBCUTANEOUS at 05:59

## 2024-07-20 RX ADMIN — OXYCODONE HYDROCHLORIDE 10 MILLIGRAM(S): 30 TABLET ORAL at 12:52

## 2024-07-20 RX ADMIN — OXYCODONE HYDROCHLORIDE 10 MILLIGRAM(S): 30 TABLET ORAL at 04:24

## 2024-07-20 RX ADMIN — OXYCODONE HYDROCHLORIDE 10 MILLIGRAM(S): 30 TABLET ORAL at 18:34

## 2024-07-20 RX ADMIN — OXYCODONE HYDROCHLORIDE 10 MILLIGRAM(S): 30 TABLET ORAL at 13:31

## 2024-07-20 RX ADMIN — Medication 20 GRAM(S): at 09:10

## 2024-07-20 RX ADMIN — OXYCODONE HYDROCHLORIDE 10 MILLIGRAM(S): 30 TABLET ORAL at 07:52

## 2024-07-20 RX ADMIN — Medication 1000 MILLIGRAM(S): at 06:00

## 2024-07-20 RX ADMIN — Medication 0.5 MILLIGRAM(S): at 06:00

## 2024-07-20 RX ADMIN — Medication 0.5 MILLIGRAM(S): at 00:00

## 2024-07-20 RX ADMIN — ATENOLOL 25 MILLIGRAM(S): 100 TABLET ORAL at 06:00

## 2024-07-20 RX ADMIN — Medication 1000 MILLIGRAM(S): at 22:08

## 2024-07-20 RX ADMIN — Medication 0.5 MILLIGRAM(S): at 22:09

## 2024-07-20 RX ADMIN — OXYCODONE HYDROCHLORIDE 10 MILLIGRAM(S): 30 TABLET ORAL at 03:24

## 2024-07-20 RX ADMIN — Medication 0.4 MILLIGRAM(S): at 22:09

## 2024-07-20 NOTE — PROGRESS NOTE ADULT - SUBJECTIVE AND OBJECTIVE BOX
Ortho Note    Subjective:  Pt comfortable without complaints, pain controlled with current pain medication regimen  Denies CP, SOB, N/V, numbness/tingling       Vital Signs Last 24 Hrs  T(C): 36.4 (20 Jul 2024 05:57), Max: 36.8 (19 Jul 2024 20:19)  T(F): 97.6 (20 Jul 2024 05:57), Max: 98.2 (19 Jul 2024 20:19)  HR: 77 (20 Jul 2024 05:57) (62 - 84)  BP: 151/72 (20 Jul 2024 05:57) (110/69 - 155/76)  BP(mean): --  RR: 20 (20 Jul 2024 05:57) (17 - 20)  SpO2: 94% (20 Jul 2024 05:57) (94% - 97%)    Parameters below as of 20 Jul 2024 05:57  Patient On (Oxygen Delivery Method): room air          Physical Exam:  General: Pt Alert and oriented, NAD  - Left Hip Dressing CDI   Pulses: +2 DP pulses, wpw toes   Sensation: silt intact bilateral LE  Motor: EHL/FHL/TA/GS- firing bilateral lower extremities            Plan of Care:  A/P: 79yMale s/p LEFT Femur Pathological Fx Biopsy and Provisional ORIF on 7/16 by Dr. SOSA  - afebrile, wbcs 7.86  - Pain Control- tylenol 1000mg PO Q8h, Oxycodone 5-10mg PO Q4h prn moderate to severe pain   - DVT ppx: Lovenox 40mg SubQ Q24h   - PT, WBS: NWB LLE,  OOB to chair   - appreciate urology recommendations   - appreciate medicine recs  - awaiting pathology biopsy- pending final report  - bowel regimen, Is use, PPI- - had bm  -Dispo- possible return to OR next week, depending on results of the biopsy taken in OR 7-16     Ortho Pager [phone]

## 2024-07-20 NOTE — PROGRESS NOTE ADULT - ASSESSMENT
{\rtf1\jybqfp54353\ansi\yuctcpc2492\ftnbj\uc1\deff0  {\fonttbl{\f0 \fnil Segoe UI;}{\f1 \fnil \fcharset0 Segoe UI;}{\f2 \fnil Times New Andrew;}}  {\colortbl ;\qmg597\aoxlp312\khlp728 ;\red0\green0\blue0 ;\red0\green0\zckd465 ;\red0\green0\blue0 ;}  {\stylesheet{\f0\fs20 Normal;}{\cs1 Default Paragraph Font;}{\cs2\f0\fs16 Line Number;}{\cs3\f2\fs24\ul\cf3 Hyperlink;}}  {\*\revtbl{Unknown;}}  \rzzyoq72736\skteqt33159\llyei1185\gyvwi4512\elexr6001\awzug2138\kqadbxf321\embvfdr829\nogrowautofit\gxciaa981\formshade\nofeaturethrottle1\dntblnsbdb\fet4\aendnotes\aftnnrlc\pgbrdrhead\pgbrdrfoot  \sectd\vreboa02135\daabin69145\guttersxn0\vbheorbx7960\ffgtflhe4809\pougfigv2138\ozpstalx4094\aucxqra659\yewkraq288\sbkpage\pgncont\pgndec  \plain\plain\f0\fs24\ql\plain\f0\fs24{\*\bkmkstart [id number]}{\*\bkmkend [id number]}\plain\f1\fs16\qqyu4594\hich\f1\dbch\f1\loch\f1\cf2\fs16\b Assessment and Plan:\plain\f1\fs16\tfpq1451\hich\f1\dbch\f1\loch\f1\cf2\fs16  \par  \trowd\eqdlma74\lastrow\fwxhcje09\trpaddfl3\lwnbhzp31\trpaddfr3\trpaddt0\trpaddft3\trpaddb0\trpaddfb3\trleft0  \clvertalt\kpwirt34\clpadft3\jccaoj09\clpadfr3\clpadl0\clpadfl3\clpadb0\clpadfb3\xesye3344  \clvertalt\zciljq72\clpadft3\ghueov16\clpadfr3\clpadl0\clpadfl3\clpadb0\clpadfb3\zogoo9317  \pard\intbl\ssparaaux0\s0\fi-120\li120\ql\plain\f0\fs24{\*\bkmkstart [id number]}{\*\bkmkend [id number]}\plain\f1\fs16\pvbt8776\hich\f1\dbch\f1\loch\f1\cf2\fs16 \'b7 \plain\f1\fs16\fssx7113\hich\f1\dbch\f1\loch\f1\cf2\fs16\b Assessment\plain\f1\fs16\ktgp5443\hich\f1\dbch\f1\loch\f1\cf2\fs16\cell  \pard\intbl\ssparaaux0\s0\li300\ri300\ql\plain\f0\fs24\plain\f1\fs16\nqug1072\hich\f1\dbch\f1\loch\f1\cf2\fs16\cell  \intbl\row  \pard\ssparaaux0\s0\ql\plain\f0\fs24\plain\f1\fs16\ouep7344\hich\f1\dbch\f1\loch\f1\cf2\fs16 79M w HTN, HLD, Prostate CA s/p XRT w chronic L leg pain for months p/w fall after L leg gave out on him, found to have proximal femoral shaft fracture, s/p biopsy   and provisional ORIF Dr. Shaw 7/16\par  \par  #Constipation - resolving. had BM yesterday. \par  \par  #Post-op state - pain controlled. PPx: SQL. On bowel regimen and incentive spirometer\par   - tylenol 1g q8\par   - PRN: Oxycodone 5/10 q4 for mod/severe pain\par  \par  #Anemia\par  Hb stable in 12s\par  possible in part dilutional given he is on fluids. Also blood loss anemia in setting of surgery. \par  \par  #L Proximal femur fracture\par   - CT C/A/P wo evidence of metastatic disease. Enlarged prostate wo fiducials\par   - MR L femur: pathologic fracture w displacement - some soft tissue present suggestive of mass w permeative marrow changes extending to lesser trochanter and mid-distal 3rd of femoral diaphysis. \par   - bone scan - signal at femoral shaft\par  \par  #HTN - home on atenolol 25mg daily, amlodipine 10mg daily, ramipril 10mg daily\par  #HLD - home on lipitor 40mg daily\par  #Leukocytosis- resolved - 11 from 8.8. Non toxic appearing. follow clinically\par  #Vitamin D insufficiency - Suboptimal - borderline w 29.9\par   - increased supplementation to 2000 IU daily D3\par  #Hyponatremia - low 130s\par  \par  Plan\par  Constipation resolved\plain\f1\fs16\ctza2689\hich\f1\dbch\f1\loch\f1\cf2\fs16\b\par  \plain\f1\fs16\avqb3552\hich\f1\dbch\f1\loch\f1\cf2\fs16 Passed TOV\par  f/u biopsy results - not update 7/20. \par  Monitor for bleeding (Hb drop).\plain\f1\fs16\otbe2763\hich\f1\dbch\f1\loch\f1\cf2\fs16\b\par  \par  \plain\f1\fs16\trxq8558\hich\f1\dbch\f1\loch\f1\cf2\fs16 Noted SPEP, UPEP negative for monoclonal band\par  Continue PT\par  \par  Co-management will continue to follow\par  Above d/w orthopedics\par  \par  \plain\f1\fs16\ozyk4920\hich\f1\dbch\f1\loch\f1\cf2\fs16\strike\plain\f1\fs16\kqkr4952\hich\f1\dbch\f1\loch\f1\cf2\fs16\plain\f1\fs16\qvqa3752\hich\f1\dbch\f1\loch\f1\cf2\fs16\par  \plain\f0\fs20\buyv3241\hich\f0\dbch\f0\loch\f0\fs20\par  }

## 2024-07-20 NOTE — PROGRESS NOTE ADULT - SUBJECTIVE AND OBJECTIVE BOX
SUBJECTIVE: No complaints, feels well, had BM. Anxious about results and further plans.       DIET:  Diet, Regular:   Supplement Feeding Modality:  Oral  Ensure Clear Cans or Servings Per Day:  1       Frequency:  Two Times a day (07-17-24 @ 16:31) [Active]      MEDICATIONS:  MEDICATIONS  (STANDING):  acetaminophen     Tablet .. 1000 milliGRAM(s) Oral every 8 hours  amLODIPine   Tablet 10 milliGRAM(s) Oral daily  atenolol  Tablet 25 milliGRAM(s) Oral daily  atorvastatin 40 milliGRAM(s) Oral at bedtime  bisacodyl Suppository 10 milliGRAM(s) Rectal daily  cholecalciferol 2000 Unit(s) Oral daily  enoxaparin Injectable 40 milliGRAM(s) SubCutaneous every 24 hours  lactulose Syrup 20 Gram(s) Oral daily  polyethylene glycol 3350 17 Gram(s) Oral daily  senna 1 Tablet(s) Oral daily  sodium chloride 0.9%. 1000 milliLiter(s) (120 mL/Hr) IV Continuous <Continuous>  tamsulosin 0.4 milliGRAM(s) Oral at bedtime    MEDICATIONS  (PRN):  ALPRAZolam 0.5 milliGRAM(s) Oral daily PRN anxiety  bisacodyl 5 milliGRAM(s) Oral every 12 hours PRN Constipation  HYDROmorphone  Injectable 0.5 milliGRAM(s) IV Push every 4 hours PRN breakthrough pain on floor  oxyCODONE    IR 5 milliGRAM(s) Oral every 4 hours PRN Moderate Pain (4 - 6)  oxyCODONE    IR 10 milliGRAM(s) Oral every 4 hours PRN Severe Pain (7 - 10)      Allergies    No Known Allergies    Intolerances        OBJECTIVE:  Vital Signs Last 24 Hrs  T(C): 36.7 (20 Jul 2024 09:07), Max: 36.8 (19 Jul 2024 20:19)  T(F): 98.1 (20 Jul 2024 09:07), Max: 98.2 (19 Jul 2024 20:19)  HR: 66 (20 Jul 2024 09:07) (66 - 84)  BP: 136/61 (20 Jul 2024 09:07) (110/69 - 155/76)  BP(mean): --  RR: 18 (20 Jul 2024 09:07) (18 - 20)  SpO2: 95% (20 Jul 2024 09:07) (94% - 97%)    Parameters below as of 20 Jul 2024 09:07  Patient On (Oxygen Delivery Method): room air      I&O's Summary    19 Jul 2024 07:01  -  20 Jul 2024 07:00  --------------------------------------------------------  IN: 0 mL / OUT: 3250 mL / NET: -3250 mL        PHYSICAL EXAM:  Gen: appears stated age, resting comfortably, NAD  HEENT: NCAT, MMM  Neck: supple  CV: RRR, no m/r/g, peripheral pulses 2+  Pulm: CTAB, no increased work of breathing, no rales/rhonchi  Abd: soft, moderately distended, NT, no rebound or guarding  Skin: warm and dry  Ext: non-tender, no edema  Neuro: AOx3, speaking in full sentences  Psych: affect and behavior appropriate, pleasant at time of interview    LABS:                        12.6   7.39  )-----------( 361      ( 20 Jul 2024 05:30 )             35.9     07-20    132<L>  |  98  |  14  ----------------------------<  81  3.9   |  25  |  0.75    Ca    8.3<L>      20 Jul 2024 05:30          CAPILLARY BLOOD GLUCOSE        Urinalysis Basic - ( 20 Jul 2024 05:30 )    Color: x / Appearance: x / SG: x / pH: x  Gluc: 81 mg/dL / Ketone: x  / Bili: x / Urobili: x   Blood: x / Protein: x / Nitrite: x   Leuk Esterase: x / RBC: x / WBC x   Sq Epi: x / Non Sq Epi: x / Bacteria: x        MICRODATA:      RADIOLOGY/OTHER STUDIES:  Reviewed

## 2024-07-21 ENCOUNTER — TRANSCRIPTION ENCOUNTER (OUTPATIENT)
Age: 79
End: 2024-07-21

## 2024-07-21 LAB
ANION GAP SERPL CALC-SCNC: 9 MMOL/L — SIGNIFICANT CHANGE UP (ref 5–17)
APPEARANCE UR: CLEAR — SIGNIFICANT CHANGE UP
BACTERIA # UR AUTO: ABNORMAL /HPF
BILIRUB UR-MCNC: NEGATIVE — SIGNIFICANT CHANGE UP
BLD GP AB SCN SERPL QL: NEGATIVE — SIGNIFICANT CHANGE UP
BUN SERPL-MCNC: 15 MG/DL — SIGNIFICANT CHANGE UP (ref 7–23)
CALCIUM SERPL-MCNC: 8.5 MG/DL — SIGNIFICANT CHANGE UP (ref 8.4–10.5)
CAST: 1 /LPF — SIGNIFICANT CHANGE UP (ref 0–4)
CHLORIDE SERPL-SCNC: 102 MMOL/L — SIGNIFICANT CHANGE UP (ref 96–108)
CO2 SERPL-SCNC: 22 MMOL/L — SIGNIFICANT CHANGE UP (ref 22–31)
COLOR SPEC: YELLOW — SIGNIFICANT CHANGE UP
CREAT SERPL-MCNC: 0.77 MG/DL — SIGNIFICANT CHANGE UP (ref 0.5–1.3)
DIFF PNL FLD: ABNORMAL
EGFR: 90 ML/MIN/1.73M2 — SIGNIFICANT CHANGE UP
GLUCOSE SERPL-MCNC: 77 MG/DL — SIGNIFICANT CHANGE UP (ref 70–99)
GLUCOSE UR QL: NEGATIVE MG/DL — SIGNIFICANT CHANGE UP
HCT VFR BLD CALC: 38.6 % — LOW (ref 39–50)
HGB BLD-MCNC: 12.8 G/DL — LOW (ref 13–17)
KETONES UR-MCNC: NEGATIVE MG/DL — SIGNIFICANT CHANGE UP
LEUKOCYTE ESTERASE UR-ACNC: ABNORMAL
MCHC RBC-ENTMCNC: 30.5 PG — SIGNIFICANT CHANGE UP (ref 27–34)
MCHC RBC-ENTMCNC: 33.2 GM/DL — SIGNIFICANT CHANGE UP (ref 32–36)
MCV RBC AUTO: 92.1 FL — SIGNIFICANT CHANGE UP (ref 80–100)
NITRITE UR-MCNC: NEGATIVE — SIGNIFICANT CHANGE UP
NRBC # BLD: 0 /100 WBCS — SIGNIFICANT CHANGE UP (ref 0–0)
PH UR: 6.5 — SIGNIFICANT CHANGE UP (ref 5–8)
PLATELET # BLD AUTO: 365 K/UL — SIGNIFICANT CHANGE UP (ref 150–400)
POTASSIUM SERPL-MCNC: 4.1 MMOL/L — SIGNIFICANT CHANGE UP (ref 3.5–5.3)
POTASSIUM SERPL-SCNC: 4.1 MMOL/L — SIGNIFICANT CHANGE UP (ref 3.5–5.3)
PROT UR-MCNC: SIGNIFICANT CHANGE UP MG/DL
RBC # BLD: 4.19 M/UL — LOW (ref 4.2–5.8)
RBC # FLD: 13.2 % — SIGNIFICANT CHANGE UP (ref 10.3–14.5)
RBC CASTS # UR COMP ASSIST: 7 /HPF — HIGH (ref 0–4)
RH IG SCN BLD-IMP: POSITIVE — SIGNIFICANT CHANGE UP
SODIUM SERPL-SCNC: 133 MMOL/L — LOW (ref 135–145)
SP GR SPEC: 1.01 — SIGNIFICANT CHANGE UP (ref 1–1.03)
SQUAMOUS # UR AUTO: 0 /HPF — SIGNIFICANT CHANGE UP (ref 0–5)
UROBILINOGEN FLD QL: 2 MG/DL (ref 0.2–1)
WBC # BLD: 7.66 K/UL — SIGNIFICANT CHANGE UP (ref 3.8–10.5)
WBC # FLD AUTO: 7.66 K/UL — SIGNIFICANT CHANGE UP (ref 3.8–10.5)
WBC UR QL: 8 /HPF — HIGH (ref 0–5)

## 2024-07-21 PROCEDURE — 99233 SBSQ HOSP IP/OBS HIGH 50: CPT

## 2024-07-21 RX ORDER — POVIDONE-IODINE 0.1 G/ML
1 SOLUTION TOPICAL ONCE
Refills: 0 | Status: DISCONTINUED | OUTPATIENT
Start: 2024-07-21 | End: 2024-07-29

## 2024-07-21 RX ORDER — DEXTROSE MONOHYDRATE, SODIUM CHLORIDE, SODIUM LACTATE, CALCIUM CHLORIDE, MAGNESIUM CHLORIDE 1.5; 538; 448; 18.4; 5.08 G/100ML; MG/100ML; MG/100ML; MG/100ML; MG/100ML
1000 SOLUTION INTRAPERITONEAL
Refills: 0 | Status: DISCONTINUED | OUTPATIENT
Start: 2024-07-21 | End: 2024-07-29

## 2024-07-21 RX ADMIN — AMLODIPINE BESYLATE 10 MILLIGRAM(S): 2.5 TABLET ORAL at 05:46

## 2024-07-21 RX ADMIN — OXYCODONE HYDROCHLORIDE 5 MILLIGRAM(S): 30 TABLET ORAL at 21:34

## 2024-07-21 RX ADMIN — Medication 0.4 MILLIGRAM(S): at 21:45

## 2024-07-21 RX ADMIN — Medication 0.5 MILLIGRAM(S): at 04:34

## 2024-07-21 RX ADMIN — OXYCODONE HYDROCHLORIDE 10 MILLIGRAM(S): 30 TABLET ORAL at 01:03

## 2024-07-21 RX ADMIN — OXYCODONE HYDROCHLORIDE 10 MILLIGRAM(S): 30 TABLET ORAL at 17:40

## 2024-07-21 RX ADMIN — ATORVASTATIN CALCIUM 40 MILLIGRAM(S): 40 TABLET, FILM COATED ORAL at 21:44

## 2024-07-21 RX ADMIN — Medication 0.5 MILLIGRAM(S): at 05:00

## 2024-07-21 RX ADMIN — Medication 1000 MILLIGRAM(S): at 21:45

## 2024-07-21 RX ADMIN — OXYCODONE HYDROCHLORIDE 10 MILLIGRAM(S): 30 TABLET ORAL at 16:40

## 2024-07-21 RX ADMIN — ATENOLOL 25 MILLIGRAM(S): 100 TABLET ORAL at 05:46

## 2024-07-21 RX ADMIN — Medication 17 GRAM(S): at 12:29

## 2024-07-21 RX ADMIN — Medication 2000 UNIT(S): at 12:28

## 2024-07-21 RX ADMIN — Medication 1000 MILLIGRAM(S): at 14:45

## 2024-07-21 RX ADMIN — Medication 1000 MILLIGRAM(S): at 05:46

## 2024-07-21 RX ADMIN — Medication 0.25 MILLIGRAM(S): at 05:46

## 2024-07-21 RX ADMIN — ENOXAPARIN SODIUM 40 MILLIGRAM(S): 120 INJECTION SUBCUTANEOUS at 05:46

## 2024-07-21 RX ADMIN — OXYCODONE HYDROCHLORIDE 5 MILLIGRAM(S): 30 TABLET ORAL at 20:34

## 2024-07-21 RX ADMIN — OXYCODONE HYDROCHLORIDE 10 MILLIGRAM(S): 30 TABLET ORAL at 00:03

## 2024-07-21 RX ADMIN — Medication 0.25 MILLIGRAM(S): at 21:44

## 2024-07-21 NOTE — PROGRESS NOTE ADULT - ASSESSMENT
Assessment and Plan:   · Assessment	  79M w HTN, HLD, Prostate CA s/p XRT w chronic L leg pain for months p/w fall after L leg gave out on him, found to have proximal femoral shaft fracture, s/p biopsy and provisional ORIF Dr. Shaw 7/16    #Constipation - resolved, change to miralax daily, hold for diarrhea     #Post-op state - pain controlled. PPx: SQL. On bowel regimen and incentive spirometer   - tylenol 1g q8   - PRN: Oxycodone 5/10 q4 for mod/severe pain    #Anemia - stable  - Hb stable in 12s  - possible in part dilutional given he is on fluids. Also blood loss anemia in setting of surgery.     #L Proximal femur fracture   - CT C/A/P wo evidence of metastatic disease. Enlarged prostate wo fiducials   - MR L femur: pathologic fracture w displacement - some soft tissue present suggestive of mass w permeative marrow changes extending to lesser trochanter and mid-distal 3rd of femoral diaphysis.    - bone scan - signal at femoral shaft    #HTN - home on atenolol 25mg daily, amlodipine 10mg daily, ramipril 10mg daily  #HLD - home on lipitor 40mg daily  #Leukocytosis- resolved - 11 from 8.8. Non toxic appearing. follow clinically  #Vitamin D insufficiency - Suboptimal - borderline w 29.9   - increased supplementation to 2000 IU daily D3  #Hyponatremia - low 130s    Plan  Constipation resolved  Passed TOV  f/u biopsy results - not update 7/21  Monitor for bleeding (Hb drop).  Out of bed to chair daily   Lidocaine to lower back for back pain    Noted SPEP, UPEP negative for monoclonal band  Continue PT

## 2024-07-21 NOTE — PROGRESS NOTE ADULT - SUBJECTIVE AND OBJECTIVE BOX
SUBJECTIVE: No acute events overnight. Says he feels well, denies pain, anxious about biopsy results. No CP, SOB, abd pain, n/v. Feels better when he gets out of bed to chair, requested PT to try and get patient out of bed.      DIET:  Diet, NPO after Midnight:      NPO Start Date: 21-Jul-2024,   NPO Start Time: 23:59 (07-21-24 @ 09:20) [Active]  Diet, Regular:   Supplement Feeding Modality:  Oral  Ensure Clear Cans or Servings Per Day:  1       Frequency:  Two Times a day (07-17-24 @ 16:31) [Active]      MEDICATIONS:  MEDICATIONS  (STANDING):  acetaminophen     Tablet .. 1000 milliGRAM(s) Oral every 8 hours  amLODIPine   Tablet 10 milliGRAM(s) Oral daily  atenolol  Tablet 25 milliGRAM(s) Oral daily  atorvastatin 40 milliGRAM(s) Oral at bedtime  bisacodyl Suppository 10 milliGRAM(s) Rectal daily  cholecalciferol 2000 Unit(s) Oral daily  lactulose Syrup 20 Gram(s) Oral daily  polyethylene glycol 3350 17 Gram(s) Oral daily  povidone iodine 5% Nasal Swab 1 Application(s) Both Nostrils once  senna 1 Tablet(s) Oral daily  sodium chloride 0.9%. 1000 milliLiter(s) (120 mL/Hr) IV Continuous <Continuous>  tamsulosin 0.4 milliGRAM(s) Oral at bedtime    MEDICATIONS  (PRN):  ALPRAZolam 0.25 milliGRAM(s) Oral two times a day PRN anxiety  bisacodyl 5 milliGRAM(s) Oral every 12 hours PRN Constipation  HYDROmorphone  Injectable 0.5 milliGRAM(s) IV Push every 4 hours PRN breakthrough pain on floor  oxyCODONE    IR 5 milliGRAM(s) Oral every 4 hours PRN Moderate Pain (4 - 6)  oxyCODONE    IR 10 milliGRAM(s) Oral every 4 hours PRN Severe Pain (7 - 10)      Allergies    No Known Allergies    Intolerances        OBJECTIVE:  Vital Signs Last 24 Hrs  T(C): 36.4 (21 Jul 2024 08:45), Max: 37 (21 Jul 2024 04:30)  T(F): 97.5 (21 Jul 2024 08:45), Max: 98.6 (21 Jul 2024 04:30)  HR: 60 (21 Jul 2024 08:45) (60 - 75)  BP: 131/75 (21 Jul 2024 08:45) (117/70 - 147/76)  BP(mean): --  RR: 18 (21 Jul 2024 08:45) (17 - 18)  SpO2: 96% (21 Jul 2024 08:45) (94% - 97%)    Parameters below as of 21 Jul 2024 08:45  Patient On (Oxygen Delivery Method): room air      I&O's Summary    20 Jul 2024 07:01  -  21 Jul 2024 07:00  --------------------------------------------------------  IN: 0 mL / OUT: 2150 mL / NET: -2150 mL    21 Jul 2024 07:01  -  21 Jul 2024 13:55  --------------------------------------------------------  IN: 0 mL / OUT: 300 mL / NET: -300 mL        PHYSICAL EXAM:  Gen: appears stated age, resting comfortably, NAD  HEENT: NCAT, MMM  Neck: supple  CV: RRR, no m/r/g, peripheral pulses 2+  Pulm: CTAB, no increased work of breathing, no rales/rhonchi  Abd: soft, ND, NT, no rebound or guarding  Skin: warm and dry  Ext: non-tender, no edema  Neuro: AOx3, speaking in full sentences  Psych: affect and behavior appropriate, pleasant at time of interview    LABS:                        12.8   7.66  )-----------( 365      ( 21 Jul 2024 05:30 )             38.6     07-21    133<L>  |  102  |  15  ----------------------------<  77  4.1   |  22  |  0.77    Ca    8.5      21 Jul 2024 05:30          CAPILLARY BLOOD GLUCOSE        Urinalysis Basic - ( 21 Jul 2024 05:30 )    Color: x / Appearance: x / SG: x / pH: x  Gluc: 77 mg/dL / Ketone: x  / Bili: x / Urobili: x   Blood: x / Protein: x / Nitrite: x   Leuk Esterase: x / RBC: x / WBC x   Sq Epi: x / Non Sq Epi: x / Bacteria: x        MICRODATA:      RADIOLOGY/OTHER STUDIES:  Reviewed

## 2024-07-22 LAB
ANION GAP SERPL CALC-SCNC: 9 MMOL/L — SIGNIFICANT CHANGE UP (ref 5–17)
APTT BLD: 27.6 SEC — SIGNIFICANT CHANGE UP (ref 24.5–35.6)
BUN SERPL-MCNC: 14 MG/DL — SIGNIFICANT CHANGE UP (ref 7–23)
CALCIUM SERPL-MCNC: 8.6 MG/DL — SIGNIFICANT CHANGE UP (ref 8.4–10.5)
CHLORIDE SERPL-SCNC: 101 MMOL/L — SIGNIFICANT CHANGE UP (ref 96–108)
CO2 SERPL-SCNC: 26 MMOL/L — SIGNIFICANT CHANGE UP (ref 22–31)
CREAT SERPL-MCNC: 0.79 MG/DL — SIGNIFICANT CHANGE UP (ref 0.5–1.3)
CULTURE RESULTS: SIGNIFICANT CHANGE UP
EGFR: 89 ML/MIN/1.73M2 — SIGNIFICANT CHANGE UP
GLUCOSE SERPL-MCNC: 91 MG/DL — SIGNIFICANT CHANGE UP (ref 70–99)
HCT VFR BLD CALC: 36.5 % — LOW (ref 39–50)
HGB BLD-MCNC: 12.7 G/DL — LOW (ref 13–17)
INR BLD: 0.97 — SIGNIFICANT CHANGE UP (ref 0.85–1.18)
MCHC RBC-ENTMCNC: 31.3 PG — SIGNIFICANT CHANGE UP (ref 27–34)
MCHC RBC-ENTMCNC: 34.8 GM/DL — SIGNIFICANT CHANGE UP (ref 32–36)
MCV RBC AUTO: 89.9 FL — SIGNIFICANT CHANGE UP (ref 80–100)
NRBC # BLD: 0 /100 WBCS — SIGNIFICANT CHANGE UP (ref 0–0)
PLATELET # BLD AUTO: 375 K/UL — SIGNIFICANT CHANGE UP (ref 150–400)
POTASSIUM SERPL-MCNC: 4.4 MMOL/L — SIGNIFICANT CHANGE UP (ref 3.5–5.3)
POTASSIUM SERPL-SCNC: 4.4 MMOL/L — SIGNIFICANT CHANGE UP (ref 3.5–5.3)
PROTHROM AB SERPL-ACNC: 11.1 SEC — SIGNIFICANT CHANGE UP (ref 9.5–13)
RBC # BLD: 4.06 M/UL — LOW (ref 4.2–5.8)
RBC # FLD: 13.7 % — SIGNIFICANT CHANGE UP (ref 10.3–14.5)
SODIUM SERPL-SCNC: 136 MMOL/L — SIGNIFICANT CHANGE UP (ref 135–145)
SPECIMEN SOURCE: SIGNIFICANT CHANGE UP
SURGICAL PATHOLOGY STUDY: SIGNIFICANT CHANGE UP
WBC # BLD: 6.63 K/UL — SIGNIFICANT CHANGE UP (ref 3.8–10.5)
WBC # FLD AUTO: 6.63 K/UL — SIGNIFICANT CHANGE UP (ref 3.8–10.5)

## 2024-07-22 PROCEDURE — 27506 TREATMENT OF THIGH FRACTURE: CPT | Mod: 78,LT

## 2024-07-22 PROCEDURE — 99223 1ST HOSP IP/OBS HIGH 75: CPT

## 2024-07-22 PROCEDURE — 99233 SBSQ HOSP IP/OBS HIGH 50: CPT

## 2024-07-22 PROCEDURE — 73552 X-RAY EXAM OF FEMUR 2/>: CPT | Mod: 26,LT

## 2024-07-22 DEVICE — IMPLANTABLE DEVICE: Type: IMPLANTABLE DEVICE | Site: LEFT | Status: FUNCTIONAL

## 2024-07-22 DEVICE — GWIRE ACCESS PIC 3.2X350MM: Type: IMPLANTABLE DEVICE | Site: LEFT | Status: FUNCTIONAL

## 2024-07-22 DEVICE — K-WIRE CARBOFIX 2.85MM X 450MM: Type: IMPLANTABLE DEVICE | Site: LEFT | Status: FUNCTIONAL

## 2024-07-22 DEVICE — GWIRE BALL TIP 2.5X1000MM: Type: IMPLANTABLE DEVICE | Site: LEFT | Status: FUNCTIONAL

## 2024-07-22 RX ORDER — ENOXAPARIN SODIUM 120 MG/.8ML
40 INJECTION SUBCUTANEOUS EVERY 24 HOURS
Refills: 0 | Status: DISCONTINUED | OUTPATIENT
Start: 2024-07-22 | End: 2024-07-29

## 2024-07-22 RX ORDER — POVIDONE-IODINE 0.1 G/ML
1 SOLUTION TOPICAL ONCE
Refills: 0 | Status: COMPLETED | OUTPATIENT
Start: 2024-07-22 | End: 2024-07-22

## 2024-07-22 RX ORDER — CHLORHEXIDINE GLUCONATE 500 MG/1
1 CLOTH TOPICAL EVERY 12 HOURS
Refills: 0 | Status: COMPLETED | OUTPATIENT
Start: 2024-07-22 | End: 2024-07-23

## 2024-07-22 RX ORDER — CHLORHEXIDINE GLUCONATE 500 MG/1
1 CLOTH TOPICAL EVERY 12 HOURS
Refills: 0 | Status: COMPLETED | OUTPATIENT
Start: 2024-07-22 | End: 2024-07-22

## 2024-07-22 RX ORDER — CEFAZOLIN SODIUM 10 G
2000 VIAL (EA) INJECTION EVERY 8 HOURS
Refills: 0 | Status: COMPLETED | OUTPATIENT
Start: 2024-07-22 | End: 2024-07-23

## 2024-07-22 RX ADMIN — Medication 1000 MILLIGRAM(S): at 05:40

## 2024-07-22 RX ADMIN — OXYCODONE HYDROCHLORIDE 10 MILLIGRAM(S): 30 TABLET ORAL at 11:04

## 2024-07-22 RX ADMIN — AMLODIPINE BESYLATE 10 MILLIGRAM(S): 2.5 TABLET ORAL at 05:40

## 2024-07-22 RX ADMIN — ATORVASTATIN CALCIUM 40 MILLIGRAM(S): 40 TABLET, FILM COATED ORAL at 22:23

## 2024-07-22 RX ADMIN — Medication 0.5 MILLIGRAM(S): at 23:00

## 2024-07-22 RX ADMIN — CHLORHEXIDINE GLUCONATE 1 APPLICATION(S): 500 CLOTH TOPICAL at 07:33

## 2024-07-22 RX ADMIN — OXYCODONE HYDROCHLORIDE 5 MILLIGRAM(S): 30 TABLET ORAL at 19:46

## 2024-07-22 RX ADMIN — Medication 0.5 MILLIGRAM(S): at 06:10

## 2024-07-22 RX ADMIN — POVIDONE-IODINE 1 APPLICATION(S): 0.1 SOLUTION TOPICAL at 14:05

## 2024-07-22 RX ADMIN — OXYCODONE HYDROCHLORIDE 10 MILLIGRAM(S): 30 TABLET ORAL at 03:51

## 2024-07-22 RX ADMIN — DEXTROSE MONOHYDRATE, SODIUM CHLORIDE, SODIUM LACTATE, CALCIUM CHLORIDE, MAGNESIUM CHLORIDE 70 MILLILITER(S): 1.5; 538; 448; 18.4; 5.08 SOLUTION INTRAPERITONEAL at 22:23

## 2024-07-22 RX ADMIN — Medication 1000 MILLIGRAM(S): at 22:23

## 2024-07-22 RX ADMIN — OXYCODONE HYDROCHLORIDE 10 MILLIGRAM(S): 30 TABLET ORAL at 08:01

## 2024-07-22 RX ADMIN — OXYCODONE HYDROCHLORIDE 10 MILLIGRAM(S): 30 TABLET ORAL at 12:04

## 2024-07-22 RX ADMIN — Medication 0.5 MILLIGRAM(S): at 22:22

## 2024-07-22 RX ADMIN — OXYCODONE HYDROCHLORIDE 10 MILLIGRAM(S): 30 TABLET ORAL at 07:01

## 2024-07-22 RX ADMIN — DEXTROSE MONOHYDRATE, SODIUM CHLORIDE, SODIUM LACTATE, CALCIUM CHLORIDE, MAGNESIUM CHLORIDE 70 MILLILITER(S): 1.5; 538; 448; 18.4; 5.08 SOLUTION INTRAPERITONEAL at 00:10

## 2024-07-22 RX ADMIN — Medication 2000 UNIT(S): at 11:05

## 2024-07-22 RX ADMIN — Medication 0.4 MILLIGRAM(S): at 22:23

## 2024-07-22 RX ADMIN — OXYCODONE HYDROCHLORIDE 5 MILLIGRAM(S): 30 TABLET ORAL at 20:35

## 2024-07-22 RX ADMIN — Medication 0.5 MILLIGRAM(S): at 01:15

## 2024-07-22 RX ADMIN — OXYCODONE HYDROCHLORIDE 10 MILLIGRAM(S): 30 TABLET ORAL at 02:51

## 2024-07-22 RX ADMIN — Medication 0.5 MILLIGRAM(S): at 00:45

## 2024-07-22 RX ADMIN — Medication 0.5 MILLIGRAM(S): at 05:40

## 2024-07-22 NOTE — CONSULT NOTE ADULT - ATTENDING COMMENTS
78 yo M with PMHx HTN, HLD, prostate cancer (s/p XRT) BIBEMS s/p mechanical fall, found to have L femoral shaft fracture, admitted to orthopedic surgery with plan for IMN with Dr. Pelayo 7/12. Medicine consulted for pre-operative clearance.      #Pre-operative clearance - Plan for L hip IMN on 7/12 with Dr. Pelayo. METS 4-10. Limited by pain/weakness i/s/o L sacroiliitis vs L ITB syndrome - not chest pain or shortness of breath. No significant cardiac/pulmonary history apart from HTN/HLD. EKG non-ischemic. No prior EKG/TTE available. No adverse reactions to anesthesia in the past in self or first-degree relatives. RCRI 1 points (Class II Risk) ~ 6.0% for 30d risk of death, MI, or cardiac arrest. Tyson score 0.2% for risk of MI or cardiac arrest, intraoperatively or up to 30d post-op. Low-risk for intermediate-risk procedure.     #HTN – Continue Amlodipine + Atenolol. Hold Ramipril holden-operatively.     #HLD – Continue Lipitor.
Seen on 7/23.    79M PMH HTN, HLD, prostate CA (definitive course SBRT & Cyberknife, completed 6/13/18), who presents with 4 months of progressive left leg pain followed by mechanical fall, found to have evidence of pathologic L femur fracture and now s/p provisional ORIF with biopsy on 7/16.     Hematology/oncology consulted for new pathology findings of B-cell lymphoma.     B-cell lymphoma  On pathology appears to be severely necrotic tissue. Discussed with  from pathology. He is not able to further subclassify due to necrotic tissue.  At this time this diagnosis is not particularly useful as lymphomas are treated differently based on subclassification.  Some B cell lymphomas do not require therapy as they are indolent in nature.  History obtained from patient and wife.    In order to further help us with work up and recommendations, we recommend the following:  -trend LDH, CBC, CMP daily  -Obtain PET scan as inpatient to identify additional sites that could be biopsied    We will follow along with you.

## 2024-07-22 NOTE — PROGRESS NOTE ADULT - ASSESSMENT
79M w HTN, HLD, Prostate CA s/p XRT w chronic L leg pain for months p/w fall after L leg gave out on him, found to have proximal femoral shaft fracture, s/p biopsy and provisional ORIF Dr. Shaw 7/16, pathology returning as B cell lymphoma - for IM Nail w Dr. Shaw 7/22    #B - Cell lymphoma - see below  #Constipation - Resolved    #Post-op state - pain controlled. PPx: held today for OR. On bowel regimen and incentive spirometer   - tylenol 1g q8   - PRN: Oxycodone 5/10 q4 for mod/severe pain  #L Proximal femur fracture   - CT C/A/P wo evidence of metastatic disease. Enlarged prostate wo fiducials   - MR L femur: pathologic fracture w displacement - some soft tissue present suggestive of mass w permeative marrow changes extending to lesser trochanter and mid-distal 3rd of femoral diaphysis.    - bone scan - signal at femoral shaft   - SPEP, UPEP negative for monoclonal band    #HTN - home on atenolol 25mg daily, amlodipine 10mg daily, ramipril 10mg daily  #HLD - home on lipitor 40mg daily  #Leukocytosis - resolved.   #Vitamin D insufficiency - Suboptimal - borderline w 29.9   - increased supplementation to 2000 IU daily D3  #Hyponatremia - resolved today.     Plan  Plan for OR today with Dr. Shaw  Discussed biopsy pathology of B Cell lymphoma w patient and family today. Will obtain Heme-Onc consult -- should not delay surgery.    - Would add serum Uric acid, serum phosphorus, CMP   - Add hepatitis panel, HIV -- disclosed to patient who is in agreement   - Will need TTE post-operatively    Continue PT post-op  Agree pt has good functional status and is low risk for intermediate risk procedure    Co-management will continue to follow  Above d/w orthopedics, Dr. Shaw

## 2024-07-22 NOTE — BRIEF OPERATIVE NOTE - NSICDXBRIEFPROCEDURE_GEN_ALL_CORE_FT
PROCEDURES:  Open biopsy, femur 16-Jul-2024 21:44:51  Bryanna George  Intramedullary nailing of femur 22-Jul-2024 17:26:34  Bryanna George  
PROCEDURES:  Open biopsy, femur 16-Jul-2024 21:44:51  Bryanna George

## 2024-07-22 NOTE — CONSULT NOTE ADULT - SUBJECTIVE AND OBJECTIVE BOX
Patient is a 79y old  Male who presents with a chief complaint of L femur pathological fx (21 Jul 2024 13:53)      HPI: HPI:  HPI  79yMale w/  c/o L thigh pain s/p fall at home. Unable to bear weight in the LLE since the fall. Denies headstrike or LOC. Denies numbness/tingling in the LLE. Denies any other trauma/injuries at this time. At baseline, community ambulator w/o assistive devices. (12 Jul 2024 01:37)      HEMATOLOGY/ONCOLOGY HISTORY:    PSHx:  FHx:  SocHx:    REVIEW OF SYSTEMS:  All other review of systems is negative unless indicated above.    OBJECTIVE:  T(C): 36.7 (07-22-24 @ 08:47), Max: 36.9 (07-21-24 @ 20:10)  HR: 69 (07-22-24 @ 11:00) (58 - 71)  BP: 126/72 (07-22-24 @ 11:00) (112/65 - 143/74)  RR: 16 (07-22-24 @ 11:00) (16 - 18)  SpO2: 96% (07-22-24 @ 11:00) (93% - 97%)  Daily     Daily     Physical Exam:  General: in no acute distress  Eyes: EOMI intact bilaterally. Anicteric sclerae, moist conjunctivae  HENT: Moist mucous membranes  Neck: Trachea midline, supple  Lungs: CTA B/L. No wheezes, rales, or rhonchi  Cardiovascular: RRR. No murmurs, rubs, or gallops  Abdomen: Soft, non-tender non-distended; No rebound or guarding  Extremities: WWP, No clubbing, cyanosis or edema  MSK: No midline bony tenderness. No CVA tenderness bilaterally  Neurological: Alert and oriented x3  Skin: Warm and dry. No obvious rash     Medications:  MEDICATIONS  (STANDING):  acetaminophen     Tablet .. 1000 milliGRAM(s) Oral every 8 hours  amLODIPine   Tablet 10 milliGRAM(s) Oral daily  atenolol  Tablet 25 milliGRAM(s) Oral daily  atorvastatin 40 milliGRAM(s) Oral at bedtime  bisacodyl Suppository 10 milliGRAM(s) Rectal daily  chlorhexidine 2% Cloths 1 Application(s) Topical every 12 hours  chlorhexidine 2% Cloths 1 Application(s) Topical every 12 hours  cholecalciferol 2000 Unit(s) Oral daily  lactated ringers. 1000 milliLiter(s) (70 mL/Hr) IV Continuous <Continuous>  lactulose Syrup 20 Gram(s) Oral daily  polyethylene glycol 3350 17 Gram(s) Oral daily  povidone iodine 5% Nasal Swab 1 Application(s) Both Nostrils once  povidone iodine 5% Nasal Swab 1 Application(s) Both Nostrils once  senna 1 Tablet(s) Oral daily  tamsulosin 0.4 milliGRAM(s) Oral at bedtime    MEDICATIONS  (PRN):  ALPRAZolam 0.25 milliGRAM(s) Oral two times a day PRN anxiety  bisacodyl 5 milliGRAM(s) Oral every 12 hours PRN Constipation  HYDROmorphone  Injectable 0.5 milliGRAM(s) IV Push every 4 hours PRN breakthrough pain on floor  oxyCODONE    IR 5 milliGRAM(s) Oral every 4 hours PRN Moderate Pain (4 - 6)  oxyCODONE    IR 10 milliGRAM(s) Oral every 4 hours PRN Severe Pain (7 - 10)      Labs:                        12.7   6.63  )-----------( 375      ( 22 Jul 2024 05:30 )             36.5     07-22    136  |  101  |  14  ----------------------------<  91  4.4   |  26  |  0.79    Ca    8.6      22 Jul 2024 05:30      PT/INR - ( 22 Jul 2024 05:30 )   PT: 11.1 sec;   INR: 0.97          PTT - ( 22 Jul 2024 05:30 )  PTT:27.6 sec  Urinalysis Basic - ( 22 Jul 2024 05:30 )    Color: x / Appearance: x / SG: x / pH: x  Gluc: 91 mg/dL / Ketone: x  / Bili: x / Urobili: x   Blood: x / Protein: x / Nitrite: x   Leuk Esterase: x / RBC: x / WBC x   Sq Epi: x / Non Sq Epi: x / Bacteria: x          Radiology: Reviewed Patient is a 79y old  Male who presents with a chief complaint of L femur pathological fx (21 Jul 2024 13:53)      HPI: HPI:  HPI  79yMale w/  c/o L thigh pain s/p fall at home. Unable to bear weight in the LLE since the fall. Denies headstrike or LOC. Denies numbness/tingling in the LLE. Denies any other trauma/injuries at this time. At baseline, community ambulator w/o assistive devices. (12 Jul 2024 01:37)      HEMATOLOGY/ONCOLOGY HISTORY:    PSHx:  FHx:  SocHx:    REVIEW OF SYSTEMS:  All other review of systems is negative unless indicated above.    OBJECTIVE:  T(C): 36.7 (07-22-24 @ 08:47), Max: 36.9 (07-21-24 @ 20:10)  HR: 69 (07-22-24 @ 11:00) (58 - 71)  BP: 126/72 (07-22-24 @ 11:00) (112/65 - 143/74)  RR: 16 (07-22-24 @ 11:00) (16 - 18)  SpO2: 96% (07-22-24 @ 11:00) (93% - 97%)  Daily     Daily     Physical Exam:  General: in no acute distress  Eyes: EOMI intact bilaterally. Anicteric sclerae, moist conjunctivae  HENT: Moist mucous membranes  Neck: Trachea midline, supple  Lungs: CTA B/L. No wheezes, rales, or rhonchi  Cardiovascular: RRR. No murmurs, rubs, or gallops  Abdomen: Soft, non-tender non-distended; No rebound or guarding  Extremities: WWP, No clubbing, cyanosis or edema  MSK: No midline bony tenderness. No CVA tenderness bilaterally  Neurological: Alert and oriented x3  Skin: Warm and dry. No obvious rash     Medications:  MEDICATIONS  (STANDING):  acetaminophen     Tablet .. 1000 milliGRAM(s) Oral every 8 hours  amLODIPine   Tablet 10 milliGRAM(s) Oral daily  atenolol  Tablet 25 milliGRAM(s) Oral daily  atorvastatin 40 milliGRAM(s) Oral at bedtime  bisacodyl Suppository 10 milliGRAM(s) Rectal daily  chlorhexidine 2% Cloths 1 Application(s) Topical every 12 hours  chlorhexidine 2% Cloths 1 Application(s) Topical every 12 hours  cholecalciferol 2000 Unit(s) Oral daily  lactated ringers. 1000 milliLiter(s) (70 mL/Hr) IV Continuous <Continuous>  lactulose Syrup 20 Gram(s) Oral daily  polyethylene glycol 3350 17 Gram(s) Oral daily  povidone iodine 5% Nasal Swab 1 Application(s) Both Nostrils once  povidone iodine 5% Nasal Swab 1 Application(s) Both Nostrils once  senna 1 Tablet(s) Oral daily  tamsulosin 0.4 milliGRAM(s) Oral at bedtime    MEDICATIONS  (PRN):  ALPRAZolam 0.25 milliGRAM(s) Oral two times a day PRN anxiety  bisacodyl 5 milliGRAM(s) Oral every 12 hours PRN Constipation  HYDROmorphone  Injectable 0.5 milliGRAM(s) IV Push every 4 hours PRN breakthrough pain on floor  oxyCODONE    IR 5 milliGRAM(s) Oral every 4 hours PRN Moderate Pain (4 - 6)  oxyCODONE    IR 10 milliGRAM(s) Oral every 4 hours PRN Severe Pain (7 - 10)      Labs:                        12.7   6.63  )-----------( 375      ( 22 Jul 2024 05:30 )             36.5     07-22    136  |  101  |  14  ----------------------------<  91  4.4   |  26  |  0.79    Ca    8.6      22 Jul 2024 05:30      PT/INR - ( 22 Jul 2024 05:30 )   PT: 11.1 sec;   INR: 0.97          PTT - ( 22 Jul 2024 05:30 )  PTT:27.6 sec  Urinalysis Basic - ( 22 Jul 2024 05:30 )    Color: x / Appearance: x / SG: x / pH: x  Gluc: 91 mg/dL / Ketone: x  / Bili: x / Urobili: x   Blood: x / Protein: x / Nitrite: x   Leuk Esterase: x / RBC: x / WBC x   Sq Epi: x / Non Sq Epi: x / Bacteria: x    Radiology: Reviewed    CT LEFT LOWER EXTREMITY with IV contrast (7/12/24):  Comminuted, markedly angulated left proximal femoral shaft fracture which is likely pathologic in etiology given the permeative appearance of the surrounding bone and periosteal reaction. There is marked adjacent soft tissue density centered within the vastus intermedius muscle which may be related to hematoma and/or a soft tissue mass. Correlate for any known malignant history.    CT C/A/P (7/12/24):   Displaced left femoral fracture. Please refer to the separate report of CT of the lower extremity. No other suspicious bony lesions.  Enlarged prostate. Fiducials in place.  No evidence of metastatic disease in the chest, abdomen and pelvis.  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    MR left femur w/wo contrast (7/14/24):  1.  Changes are again seen most consistent with pathologic fracture of the left mid femur with displacement.  2.  There is surrounding heterogeneous soft tissue is some enhancement concerning for mass with permeative marrow changes extending into the lesser trochanter and mid to distal 3rd of the femoral diaphysis.  3.  Somewhat bandlike edema of the left iliac wing without significant enhancement may favor stress reaction as opposed additional lesion.  4.  Clinical and laboratory correlation is suggested. Consider histologic correlation and/or PET CT for furtherevaluation as warranted.    NM Bone imaging total (7/15/24):   Increased uptake at the site of known fracture at the proximal left femur. No scintigraphic evidence of osteoblastic metastatic disease.   Patient is a 79y old  Male who presents with a chief complaint of L femur pathological fx (21 Jul 2024 13:53)      HPI: HPI:  HPI  79yMale w/  c/o L thigh pain s/p fall at home. Unable to bear weight in the LLE since the fall. Denies headstrike or LOC. Denies numbness/tingling in the LLE. Denies any other trauma/injuries at this time. At baseline, community ambulator w/o assistive devices. (12 Jul 2024 01:37)    HPI:  79yMale w/  c/o L thigh pain s/p mechanical fall. Unable to bear weight in the LLE since the fall. Denies headstrike or LOC. Denies numbness/tingling in the LLE. Denies any other trauma/injuries at this time. At baseline, community ambulator w/o assistive devices.    Patient is an 79YM with PMH of HTN, osteoporosis, aortic aneurysm (4 cm), elevated calcium score, presenting after a mechanical fall. States that he was standing in the kitchen microwaving dinner when his leg gave out. Denies headstrike but sustained a left femoral shaft fracture, plan for intermedullary nail placement on 7/12 am. Reports for the past four months he has had worsening left lower extremity pain, originally thought to be inflammation of his IT band. Was using theragun on left thigh muscle for about two weeks and sustained bruise and swelling to the area after using it. In terms of functional capacity, about a month ago, patient was able to walk 1 mile without pain. Walking or movement never limited due to SOB or chest pain. And prior to four months ago, he was able to walk 1-3 mi daily without issue. He was also able to climb 5-6 flights of stairs. Presently denies fevers, chill, CP, SOB, N/V/D/C or abdominal pain.     HEMATOLOGY/ONCOLOGY HISTORY:    PSHx:  FHx:  SocHx:    REVIEW OF SYSTEMS:  All other review of systems is negative unless indicated above.    OBJECTIVE:  T(C): 36.7 (07-22-24 @ 08:47), Max: 36.9 (07-21-24 @ 20:10)  HR: 69 (07-22-24 @ 11:00) (58 - 71)  BP: 126/72 (07-22-24 @ 11:00) (112/65 - 143/74)  RR: 16 (07-22-24 @ 11:00) (16 - 18)  SpO2: 96% (07-22-24 @ 11:00) (93% - 97%)  Daily     Daily     Physical Exam:  General: in no acute distress  Eyes: EOMI intact bilaterally. Anicteric sclerae, moist conjunctivae  HENT: Moist mucous membranes  Neck: Trachea midline, supple  Lungs: CTA B/L. No wheezes, rales, or rhonchi  Cardiovascular: RRR. No murmurs, rubs, or gallops  Abdomen: Soft, non-tender non-distended; No rebound or guarding  Extremities: WWP, No clubbing, cyanosis or edema  MSK: No midline bony tenderness. No CVA tenderness bilaterally  Neurological: Alert and oriented x3  Skin: Warm and dry. No obvious rash     Medications:  MEDICATIONS  (STANDING):  acetaminophen     Tablet .. 1000 milliGRAM(s) Oral every 8 hours  amLODIPine   Tablet 10 milliGRAM(s) Oral daily  atenolol  Tablet 25 milliGRAM(s) Oral daily  atorvastatin 40 milliGRAM(s) Oral at bedtime  bisacodyl Suppository 10 milliGRAM(s) Rectal daily  chlorhexidine 2% Cloths 1 Application(s) Topical every 12 hours  chlorhexidine 2% Cloths 1 Application(s) Topical every 12 hours  cholecalciferol 2000 Unit(s) Oral daily  lactated ringers. 1000 milliLiter(s) (70 mL/Hr) IV Continuous <Continuous>  lactulose Syrup 20 Gram(s) Oral daily  polyethylene glycol 3350 17 Gram(s) Oral daily  povidone iodine 5% Nasal Swab 1 Application(s) Both Nostrils once  povidone iodine 5% Nasal Swab 1 Application(s) Both Nostrils once  senna 1 Tablet(s) Oral daily  tamsulosin 0.4 milliGRAM(s) Oral at bedtime    MEDICATIONS  (PRN):  ALPRAZolam 0.25 milliGRAM(s) Oral two times a day PRN anxiety  bisacodyl 5 milliGRAM(s) Oral every 12 hours PRN Constipation  HYDROmorphone  Injectable 0.5 milliGRAM(s) IV Push every 4 hours PRN breakthrough pain on floor  oxyCODONE    IR 5 milliGRAM(s) Oral every 4 hours PRN Moderate Pain (4 - 6)  oxyCODONE    IR 10 milliGRAM(s) Oral every 4 hours PRN Severe Pain (7 - 10)      Labs:                        12.7   6.63  )-----------( 375      ( 22 Jul 2024 05:30 )             36.5     07-22    136  |  101  |  14  ----------------------------<  91  4.4   |  26  |  0.79    Ca    8.6      22 Jul 2024 05:30      PT/INR - ( 22 Jul 2024 05:30 )   PT: 11.1 sec;   INR: 0.97          PTT - ( 22 Jul 2024 05:30 )  PTT:27.6 sec  Urinalysis Basic - ( 22 Jul 2024 05:30 )    Color: x / Appearance: x / SG: x / pH: x  Gluc: 91 mg/dL / Ketone: x  / Bili: x / Urobili: x   Blood: x / Protein: x / Nitrite: x   Leuk Esterase: x / RBC: x / WBC x   Sq Epi: x / Non Sq Epi: x / Bacteria: x    Radiology: Reviewed    CT LEFT LOWER EXTREMITY with IV contrast (7/12/24):  Comminuted, markedly angulated left proximal femoral shaft fracture which is likely pathologic in etiology given the permeative appearance of the surrounding bone and periosteal reaction. There is marked adjacent soft tissue density centered within the vastus intermedius muscle which may be related to hematoma and/or a soft tissue mass. Correlate for any known malignant history.    CT C/A/P (7/12/24):   Displaced left femoral fracture. Please refer to the separate report of CT of the lower extremity. No other suspicious bony lesions.  Enlarged prostate. Fiducials in place.  No evidence of metastatic disease in the chest, abdomen and pelvis.  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    MR left femur w/wo contrast (7/14/24):  1.  Changes are again seen most consistent with pathologic fracture of the left mid femur with displacement.  2.  There is surrounding heterogeneous soft tissue is some enhancement concerning for mass with permeative marrow changes extending into the lesser trochanter and mid to distal 3rd of the femoral diaphysis.  3.  Somewhat bandlike edema of the left iliac wing without significant enhancement may favor stress reaction as opposed additional lesion.  4.  Clinical and laboratory correlation is suggested. Consider histologic correlation and/or PET CT for furtherevaluation as warranted.    NM Bone imaging total (7/15/24):   Increased uptake at the site of known fracture at the proximal left femur. No scintigraphic evidence of osteoblastic metastatic disease.   Patient is a 79y old  Male who presents with a chief complaint of L femur pathological fx (21 Jul 2024 13:53)    HPI:  79y Male w/  c/o L thigh pain s/p mechanical fall. Unable to bear weight in the LLE since the fall. Denies head strike or LOC. Denies numbness/tingling in the LLE. Denies any other trauma/injuries at this time. At baseline, community ambulator w/o assistive devices.    Patient is an 79YM with PMH of HTN, osteoporosis, aortic aneurysm (4 cm), elevated calcium score, presenting after a mechanical fall. States that he was standing in the kitchen microwaving dinner when his leg gave out. Denies headstrike but sustained a left femoral shaft fracture, plan for intermedullary nail placement on 7/12 am. Reports for the past four months he has had worsening left lower extremity pain, originally thought to be inflammation of his IT band. Was using theragun on left thigh muscle for about two weeks and sustained bruise and swelling to the area after using it. In terms of functional capacity, about a month ago, patient was able to walk 1 mile without pain. Walking or movement never limited due to SOB or chest pain. And prior to four months ago, he was able to walk 1-3 mi daily without issue. He was also able to climb 5-6 flights of stairs. Presently denies fevers, chill, CP, SOB, N/V/D/C or abdominal pain.     HEMATOLOGY/ONCOLOGY HISTORY: Above history confirmed. __________    PMHx: HTN, HLD, prostate Ca (completed definitive SBRT on 6/13/18)  PSHx: Cataract surgery  FHx: Non-contributory, denies FHx of cancer or blood disorders  SocHx: Never smoker, drinks 1-2 glasses of wine daily. Denies illicit drug use.    REVIEW OF SYSTEMS:  All other review of systems is negative unless indicated above.    OBJECTIVE:  T(C): 36.7 (07-22-24 @ 08:47), Max: 36.9 (07-21-24 @ 20:10)  HR: 69 (07-22-24 @ 11:00) (58 - 71)  BP: 126/72 (07-22-24 @ 11:00) (112/65 - 143/74)  RR: 16 (07-22-24 @ 11:00) (16 - 18)  SpO2: 96% (07-22-24 @ 11:00) (93% - 97%)  Daily     Daily     Physical Exam:  General: in no acute distress  Eyes: EOMI intact bilaterally. Anicteric sclerae, moist conjunctivae  HENT: Moist mucous membranes  Neck: Trachea midline, supple  Lungs: CTA B/L. No wheezes, rales, or rhonchi  Cardiovascular: RRR. No murmurs, rubs, or gallops  Abdomen: Soft, non-tender non-distended; No rebound or guarding  Extremities: WWP, No clubbing, cyanosis or edema  MSK: No midline bony tenderness. No CVA tenderness bilaterally  Neurological: Alert and oriented x3  Skin: Warm and dry. No obvious rash     Medications:  MEDICATIONS  (STANDING):  acetaminophen     Tablet .. 1000 milliGRAM(s) Oral every 8 hours  amLODIPine   Tablet 10 milliGRAM(s) Oral daily  atenolol  Tablet 25 milliGRAM(s) Oral daily  atorvastatin 40 milliGRAM(s) Oral at bedtime  bisacodyl Suppository 10 milliGRAM(s) Rectal daily  chlorhexidine 2% Cloths 1 Application(s) Topical every 12 hours  chlorhexidine 2% Cloths 1 Application(s) Topical every 12 hours  cholecalciferol 2000 Unit(s) Oral daily  lactated ringers. 1000 milliLiter(s) (70 mL/Hr) IV Continuous <Continuous>  lactulose Syrup 20 Gram(s) Oral daily  polyethylene glycol 3350 17 Gram(s) Oral daily  povidone iodine 5% Nasal Swab 1 Application(s) Both Nostrils once  povidone iodine 5% Nasal Swab 1 Application(s) Both Nostrils once  senna 1 Tablet(s) Oral daily  tamsulosin 0.4 milliGRAM(s) Oral at bedtime    MEDICATIONS  (PRN):  ALPRAZolam 0.25 milliGRAM(s) Oral two times a day PRN anxiety  bisacodyl 5 milliGRAM(s) Oral every 12 hours PRN Constipation  HYDROmorphone  Injectable 0.5 milliGRAM(s) IV Push every 4 hours PRN breakthrough pain on floor  oxyCODONE    IR 5 milliGRAM(s) Oral every 4 hours PRN Moderate Pain (4 - 6)  oxyCODONE    IR 10 milliGRAM(s) Oral every 4 hours PRN Severe Pain (7 - 10)      Labs:                        12.7   6.63  )-----------( 375      ( 22 Jul 2024 05:30 )             36.5     07-22    136  |  101  |  14  ----------------------------<  91  4.4   |  26  |  0.79    Ca    8.6      22 Jul 2024 05:30      PT/INR - ( 22 Jul 2024 05:30 )   PT: 11.1 sec;   INR: 0.97          PTT - ( 22 Jul 2024 05:30 )  PTT:27.6 sec  Urinalysis Basic - ( 22 Jul 2024 05:30 )    Color: x / Appearance: x / SG: x / pH: x  Gluc: 91 mg/dL / Ketone: x  / Bili: x / Urobili: x   Blood: x / Protein: x / Nitrite: x   Leuk Esterase: x / RBC: x / WBC x   Sq Epi: x / Non Sq Epi: x / Bacteria: x    Radiology: Reviewed    CT LEFT LOWER EXTREMITY with IV contrast (7/12/24):  Comminuted, markedly angulated left proximal femoral shaft fracture which is likely pathologic in etiology given the permeative appearance of the surrounding bone and periosteal reaction. There is marked adjacent soft tissue density centered within the vastus intermedius muscle which may be related to hematoma and/or a soft tissue mass. Correlate for any known malignant history.    CT C/A/P (7/12/24):   Displaced left femoral fracture. Please refer to the separate report of CT of the lower extremity. No other suspicious bony lesions.  Enlarged prostate. Fiducials in place.  No evidence of metastatic disease in the chest, abdomen and pelvis.  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    MR left femur w/wo contrast (7/14/24):  1.  Changes are again seen most consistent with pathologic fracture of the left mid femur with displacement.  2.  There is surrounding heterogeneous soft tissue is some enhancement concerning for mass with permeative marrow changes extending into the lesser trochanter and mid to distal 3rd of the femoral diaphysis.  3.  Somewhat bandlike edema of the left iliac wing without significant enhancement may favor stress reaction as opposed additional lesion.  4.  Clinical and laboratory correlation is suggested. Consider histologic correlation and/or PET CT for furtherevaluation as warranted.    NM Bone imaging total (7/15/24):   Increased uptake at the site of known fracture at the proximal left femur. No scintigraphic evidence of osteoblastic metastatic disease.   Patient is a 79y old  Male who presents with a chief complaint of L femur pathological fx (21 Jul 2024 13:53)    HPI:  79y Male w/  c/o L thigh pain s/p mechanical fall. Unable to bear weight in the LLE since the fall. Denies head strike or LOC. Denies numbness/tingling in the LLE. Denies any other trauma/injuries at this time. At baseline, community ambulator w/o assistive devices.    Patient is an 79YM with PMH of HTN, osteoporosis, aortic aneurysm (4 cm), elevated calcium score, presenting after a mechanical fall. States that he was standing in the kitchen microwaving dinner when his leg gave out. Denies headstrike but sustained a left femoral shaft fracture, plan for intermedullary nail placement on 7/12 am. Reports for the past four months he has had worsening left lower extremity pain, originally thought to be inflammation of his IT band. Was using theragun on left thigh muscle for about two weeks and sustained bruise and swelling to the area after using it. In terms of functional capacity, about a month ago, patient was able to walk 1 mile without pain. Walking or movement never limited due to SOB or chest pain. And prior to four months ago, he was able to walk 1-3 mi daily without issue. He was also able to climb 5-6 flights of stairs. Presently denies fevers, chill, CP, SOB, N/V/D/C or abdominal pain.     HEMATOLOGY/ONCOLOGY HISTORY: Above history confirmed. __________    PMHx: HTN, HLD, prostate Ca (completed definitive SBRT on 6/13/18)  PSHx: Cataract surgery  FHx: Non-contributory, denies FHx of cancer or blood disorders  SocHx: Never smoker, drinks 1-2 glasses of wine daily. Denies illicit drug use.    REVIEW OF SYSTEMS:  All other review of systems is negative unless indicated above.    OBJECTIVE:  T(C): 36.7 (07-22-24 @ 08:47), Max: 36.9 (07-21-24 @ 20:10)  HR: 69 (07-22-24 @ 11:00) (58 - 71)  BP: 126/72 (07-22-24 @ 11:00) (112/65 - 143/74)  RR: 16 (07-22-24 @ 11:00) (16 - 18)  SpO2: 96% (07-22-24 @ 11:00) (93% - 97%)  Daily     Daily     Physical Exam:  General: in no acute distress, speaking in full sentences on room air  Eyes: PERRLA. EOMI intact bilaterally. Anicteric sclerae, moist conjunctivae  HENT: Moist mucous membranes  Neck: Trachea midline, supple  Lungs: CTA B/L. No wheezes, rales, or rhonchi  Cardiovascular: RRR. No murmurs, rubs, or gallops  Abdomen: Soft, non-tender non-distended; No rebound or guarding  Extremities: WWP, No clubbing, cyanosis. Left leg with 2+ pitting edema to knee. 5/5 strength in UE. 5/5 strength in ankle flexion/extension. Cranial nerves grossly intact, no focal neurologic deficits.  MSK: No midline bony tenderness. No CVA tenderness bilaterally  Neurological: Alert and oriented x3  Skin: Warm and dry. No obvious rash. Left lateral thigh dressing CDI.     Medications:  MEDICATIONS  (STANDING):  acetaminophen     Tablet .. 1000 milliGRAM(s) Oral every 8 hours  amLODIPine   Tablet 10 milliGRAM(s) Oral daily  atenolol  Tablet 25 milliGRAM(s) Oral daily  atorvastatin 40 milliGRAM(s) Oral at bedtime  bisacodyl Suppository 10 milliGRAM(s) Rectal daily  chlorhexidine 2% Cloths 1 Application(s) Topical every 12 hours  chlorhexidine 2% Cloths 1 Application(s) Topical every 12 hours  cholecalciferol 2000 Unit(s) Oral daily  lactated ringers. 1000 milliLiter(s) (70 mL/Hr) IV Continuous <Continuous>  lactulose Syrup 20 Gram(s) Oral daily  polyethylene glycol 3350 17 Gram(s) Oral daily  povidone iodine 5% Nasal Swab 1 Application(s) Both Nostrils once  povidone iodine 5% Nasal Swab 1 Application(s) Both Nostrils once  senna 1 Tablet(s) Oral daily  tamsulosin 0.4 milliGRAM(s) Oral at bedtime    MEDICATIONS  (PRN):  ALPRAZolam 0.25 milliGRAM(s) Oral two times a day PRN anxiety  bisacodyl 5 milliGRAM(s) Oral every 12 hours PRN Constipation  HYDROmorphone  Injectable 0.5 milliGRAM(s) IV Push every 4 hours PRN breakthrough pain on floor  oxyCODONE    IR 5 milliGRAM(s) Oral every 4 hours PRN Moderate Pain (4 - 6)  oxyCODONE    IR 10 milliGRAM(s) Oral every 4 hours PRN Severe Pain (7 - 10)      Labs:                        12.7   6.63  )-----------( 375      ( 22 Jul 2024 05:30 )             36.5     07-22    136  |  101  |  14  ----------------------------<  91  4.4   |  26  |  0.79    Ca    8.6      22 Jul 2024 05:30      PT/INR - ( 22 Jul 2024 05:30 )   PT: 11.1 sec;   INR: 0.97          PTT - ( 22 Jul 2024 05:30 )  PTT:27.6 sec  Urinalysis Basic - ( 22 Jul 2024 05:30 )    Color: x / Appearance: x / SG: x / pH: x  Gluc: 91 mg/dL / Ketone: x  / Bili: x / Urobili: x   Blood: x / Protein: x / Nitrite: x   Leuk Esterase: x / RBC: x / WBC x   Sq Epi: x / Non Sq Epi: x / Bacteria: x    Radiology: Reviewed    CT LEFT LOWER EXTREMITY with IV contrast (7/12/24):  Comminuted, markedly angulated left proximal femoral shaft fracture which is likely pathologic in etiology given the permeative appearance of the surrounding bone and periosteal reaction. There is marked adjacent soft tissue density centered within the vastus intermedius muscle which may be related to hematoma and/or a soft tissue mass. Correlate for any known malignant history.    CT C/A/P (7/12/24):   Displaced left femoral fracture. Please refer to the separate report of CT of the lower extremity. No other suspicious bony lesions.  Enlarged prostate. Fiducials in place.  No evidence of metastatic disease in the chest, abdomen and pelvis.  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    MR left femur w/wo contrast (7/14/24):  1.  Changes are again seen most consistent with pathologic fracture of the left mid femur with displacement.  2.  There is surrounding heterogeneous soft tissue is some enhancement concerning for mass with permeative marrow changes extending into the lesser trochanter and mid to distal 3rd of the femoral diaphysis.  3.  Somewhat bandlike edema of the left iliac wing without significant enhancement may favor stress reaction as opposed additional lesion.  4.  Clinical and laboratory correlation is suggested. Consider histologic correlation and/or PET CT for furtherevaluation as warranted.    NM Bone imaging total (7/15/24):   Increased uptake at the site of known fracture at the proximal left femur. No scintigraphic evidence of osteoblastic metastatic disease.   Patient is a 79y old  Male who presents with a chief complaint of L femur pathological fx (21 Jul 2024 13:53)    HPI:  79y Male w/  c/o L thigh pain s/p mechanical fall. Unable to bear weight in the LLE since the fall. Denies head strike or LOC. Denies numbness/tingling in the LLE. Denies any other trauma/injuries at this time. At baseline, community ambulator w/o assistive devices.    Patient is an 79YM with PMH of HTN, osteoporosis, aortic aneurysm (4 cm), elevated calcium score, presenting after a mechanical fall. States that he was standing in the kitchen microwaving dinner when his leg gave out. Denies headstrike but sustained a left femoral shaft fracture, plan for intermedullary nail placement on 7/12 am. Reports for the past four months he has had worsening left lower extremity pain, originally thought to be inflammation of his IT band. Was using theragun on left thigh muscle for about two weeks and sustained bruise and swelling to the area after using it. In terms of functional capacity, about a month ago, patient was able to walk 1 mile without pain. Walking or movement never limited due to SOB or chest pain. And prior to four months ago, he was able to walk 1-3 mi daily without issue. He was also able to climb 5-6 flights of stairs. Presently denies fevers, chill, CP, SOB, N/V/D/C or abdominal pain.     HEMATOLOGY/ONCOLOGY HISTORY: Above history confirmed.     79M PMH HTN, HLD, prostate CA (definitive course SBRT and Cyberknife, completed 6/13/18), who presents with 4 months of progressive left leg pain followed by mechanical fall. pt states that 4 months ago, he developed LLE pain, thought to be IT band inflammation. Notes limited improvement with PT and supportive care, however, symptoms worsened over the last few weeks to the point of needing a cane to ambulate. On 7/11, was standing in kitchen waiting for microwave when he felt his leg give out from underneath him.  Prior to progessive leg pain 4 months ago, was very active. Climbed 5-6 flights of stairs for exercise, runs and swims regularly. ROS positive for lower back pain over the same period of time, as well as left hip pain. Otherwise, denies fevers/recurrent night sweats/chills, cough, neck pain, chest pain, SOB, nausea/vomiting, abdominal pain, early satiety, saddle anesthesia, dysuria, bowel/bladder incontinence.    PMHx: HTN, HLD, prostate Ca (completed definitive SBRT on 6/13/18)  PSHx: Cataract surgery  FHx: Non-contributory, denies FHx of cancer or blood disorders  SocHx: Never smoker, drinks 1-2 glasses of wine daily. Denies illicit drug use.    REVIEW OF SYSTEMS:  All other review of systems is negative unless indicated above.    OBJECTIVE:  T(C): 36.7 (07-22-24 @ 08:47), Max: 36.9 (07-21-24 @ 20:10)  HR: 69 (07-22-24 @ 11:00) (58 - 71)  BP: 126/72 (07-22-24 @ 11:00) (112/65 - 143/74)  RR: 16 (07-22-24 @ 11:00) (16 - 18)  SpO2: 96% (07-22-24 @ 11:00) (93% - 97%)  Daily     Daily     Physical Exam:  General: in no acute distress, speaking in full sentences on room air  Eyes: PERRLA. EOMI intact bilaterally. Anicteric sclerae, moist conjunctivae  HENT: Moist mucous membranes  Neck: Trachea midline, supple  Lungs: CTA B/L. No wheezes, rales, or rhonchi  Cardiovascular: RRR. No murmurs, rubs, or gallops  Abdomen: Soft, non-tender non-distended; No rebound or guarding  Extremities: WWP, No clubbing, cyanosis. Left leg with 2+ pitting edema to knee. 5/5 strength in UE. 5/5 strength in ankle flexion/extension. Cranial nerves grossly intact, no focal neurologic deficits.  MSK: No midline bony tenderness. No CVA tenderness bilaterally  Neurological: Alert and oriented x3  Skin: Warm and dry. No obvious rash. Left lateral thigh dressing CDI.     Medications:  MEDICATIONS  (STANDING):  acetaminophen     Tablet .. 1000 milliGRAM(s) Oral every 8 hours  amLODIPine   Tablet 10 milliGRAM(s) Oral daily  atenolol  Tablet 25 milliGRAM(s) Oral daily  atorvastatin 40 milliGRAM(s) Oral at bedtime  bisacodyl Suppository 10 milliGRAM(s) Rectal daily  chlorhexidine 2% Cloths 1 Application(s) Topical every 12 hours  chlorhexidine 2% Cloths 1 Application(s) Topical every 12 hours  cholecalciferol 2000 Unit(s) Oral daily  lactated ringers. 1000 milliLiter(s) (70 mL/Hr) IV Continuous <Continuous>  lactulose Syrup 20 Gram(s) Oral daily  polyethylene glycol 3350 17 Gram(s) Oral daily  povidone iodine 5% Nasal Swab 1 Application(s) Both Nostrils once  povidone iodine 5% Nasal Swab 1 Application(s) Both Nostrils once  senna 1 Tablet(s) Oral daily  tamsulosin 0.4 milliGRAM(s) Oral at bedtime    MEDICATIONS  (PRN):  ALPRAZolam 0.25 milliGRAM(s) Oral two times a day PRN anxiety  bisacodyl 5 milliGRAM(s) Oral every 12 hours PRN Constipation  HYDROmorphone  Injectable 0.5 milliGRAM(s) IV Push every 4 hours PRN breakthrough pain on floor  oxyCODONE    IR 5 milliGRAM(s) Oral every 4 hours PRN Moderate Pain (4 - 6)  oxyCODONE    IR 10 milliGRAM(s) Oral every 4 hours PRN Severe Pain (7 - 10)      Labs:                        12.7   6.63  )-----------( 375      ( 22 Jul 2024 05:30 )             36.5     07-22    136  |  101  |  14  ----------------------------<  91  4.4   |  26  |  0.79    Ca    8.6      22 Jul 2024 05:30      PT/INR - ( 22 Jul 2024 05:30 )   PT: 11.1 sec;   INR: 0.97          PTT - ( 22 Jul 2024 05:30 )  PTT:27.6 sec  Urinalysis Basic - ( 22 Jul 2024 05:30 )    Color: x / Appearance: x / SG: x / pH: x  Gluc: 91 mg/dL / Ketone: x  / Bili: x / Urobili: x   Blood: x / Protein: x / Nitrite: x   Leuk Esterase: x / RBC: x / WBC x   Sq Epi: x / Non Sq Epi: x / Bacteria: x    Radiology: Reviewed    CT LEFT LOWER EXTREMITY with IV contrast (7/12/24):  Comminuted, markedly angulated left proximal femoral shaft fracture which is likely pathologic in etiology given the permeative appearance of the surrounding bone and periosteal reaction. There is marked adjacent soft tissue density centered within the vastus intermedius muscle which may be related to hematoma and/or a soft tissue mass. Correlate for any known malignant history.    CT C/A/P (7/12/24):   Displaced left femoral fracture. Please refer to the separate report of CT of the lower extremity. No other suspicious bony lesions.  Enlarged prostate. Fiducials in place.  No evidence of metastatic disease in the chest, abdomen and pelvis.  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    MR left femur w/wo contrast (7/14/24):  1.  Changes are again seen most consistent with pathologic fracture of the left mid femur with displacement.  2.  There is surrounding heterogeneous soft tissue is some enhancement concerning for mass with permeative marrow changes extending into the lesser trochanter and mid to distal 3rd of the femoral diaphysis.  3.  Somewhat bandlike edema of the left iliac wing without significant enhancement may favor stress reaction as opposed additional lesion.  4.  Clinical and laboratory correlation is suggested. Consider histologic correlation and/or PET CT for furtherevaluation as warranted.    NM Bone imaging total (7/15/24):   Increased uptake at the site of known fracture at the proximal left femur. No scintigraphic evidence of osteoblastic metastatic disease.   Patient is a 79y old  Male who presents with a chief complaint of L femur pathological fx (2024 13:53)    HPI:  79y Male w/  c/o L thigh pain s/p mechanical fall. Unable to bear weight in the LLE since the fall. Denies head strike or LOC. Denies numbness/tingling in the LLE. Denies any other trauma/injuries at this time. At baseline, community ambulator w/o assistive devices.    Patient is an 79YM with PMH of HTN, osteoporosis, aortic aneurysm (4 cm), elevated calcium score, presenting after a mechanical fall. States that he was standing in the kitchen microwaving dinner when his leg gave out. Denies headstrike but sustained a left femoral shaft fracture, plan for intermedullary nail placement on 712 am. Reports for the past four months he has had worsening left lower extremity pain, originally thought to be inflammation of his IT band. Was using theragun on left thigh muscle for about two weeks and sustained bruise and swelling to the area after using it. In terms of functional capacity, about a month ago, patient was able to walk 1 mile without pain. Walking or movement never limited due to SOB or chest pain. And prior to four months ago, he was able to walk 1-3 mi daily without issue. He was also able to climb 5-6 flights of stairs. Presently denies fevers, chill, CP, SOB, N/V/D/C or abdominal pain.     HEMATOLOGY/ONCOLOGY HISTORY: Above history confirmed.     79M PMH HTN, HLD, prostate CA (definitive course SBRT and Cyberknife, completed 18), who presents with 4 months of progressive left leg pain followed by mechanical fall. pt states that 4 months ago, he developed LLE pain, thought to be IT band inflammation. Notes limited improvement with PT and supportive care, however, symptoms worsened over the last few weeks to the point of needing a cane to ambulate. On , was standing in kitchen waiting for microwave when he felt his leg give out from underneath him.    Prior to progessive leg pain 4 months ago, was very active. Climbed 5-6 flights of stairs for exercise, runs and swims regularly. ROS positive for lower back pain over the same period of time, as well as left hip pain. Otherwise, denies fevers/recurrent night sweats/chills, cough, neck pain, chest pain, SOB, nausea/vomiting, abdominal pain, early satiety, saddle anesthesia, dysuria, bowel/bladder incontinence. Denies unintentional weight loss, but wife states he appears thinner over last few weeks. Last colonoscopy , unremarkable.    PMHx: HTN, HLD, prostate Ca (completed definitive SBRT on 18)  PSHx: Cataract surgery  FHx: Non-contributory, denies FHx of cancer or blood disorders  SocHx: Never smoker, drinks 1-2 glasses of wine daily. Denies illicit drug use.  Lives with wife, Nuris. Daughter, Angelica lives in [place]. Independent in ADLs prior to injury. Previously worked with [employer] (formerly [employer]) with exposure to solvents ([id number]). Now worked as .    REVIEW OF SYSTEMS:  All other review of systems is negative unless indicated above.    OBJECTIVE:  T(C): 36.7 (24 @ 08:47), Max: 36.9 (24 @ 20:10)  HR: 69 (24 @ 11:00) (58 - 71)  BP: 126/72 (24 @ 11:00) (112/65 - 143/74)  RR: 16 (24 @ 11:00) (16 - 18)  SpO2: 96% (24 @ 11:00) (93% - 97%)  Daily     Daily     Physical Exam:  General: in no acute distress, speaking in full sentences on room air. ECO (prior to injury)  Eyes: PERRLA. EOMI intact bilaterally. Anicteric sclerae, moist conjunctivae  HENT: Moist mucous membranes  Neck: Trachea midline, supple  Lungs: CTA B/L. No wheezes, rales, or rhonchi  Cardiovascular: RRR. No murmurs, rubs, or gallops  Abdomen: Soft, non-tender non-distended; No rebound or guarding  Extremities: WWP, No clubbing, cyanosis. Left leg with 2+ pitting edema to knee. 5/5 strength in UE. 5/5 strength in ankle flexion/extension. Cranial nerves grossly intact, no focal neurologic deficits.  MSK: No midline bony tenderness. No CVA tenderness bilaterally  Neurological: Alert and oriented x3  Skin: Warm and dry. No obvious rash. Left lateral thigh dressing CDI.     Medications:  MEDICATIONS  (STANDING):  acetaminophen     Tablet .. 1000 milliGRAM(s) Oral every 8 hours  amLODIPine   Tablet 10 milliGRAM(s) Oral daily  atenolol  Tablet 25 milliGRAM(s) Oral daily  atorvastatin 40 milliGRAM(s) Oral at bedtime  bisacodyl Suppository 10 milliGRAM(s) Rectal daily  chlorhexidine 2% Cloths 1 Application(s) Topical every 12 hours  chlorhexidine 2% Cloths 1 Application(s) Topical every 12 hours  cholecalciferol 2000 Unit(s) Oral daily  lactated ringers. 1000 milliLiter(s) (70 mL/Hr) IV Continuous <Continuous>  lactulose Syrup 20 Gram(s) Oral daily  polyethylene glycol 3350 17 Gram(s) Oral daily  povidone iodine 5% Nasal Swab 1 Application(s) Both Nostrils once  povidone iodine 5% Nasal Swab 1 Application(s) Both Nostrils once  senna 1 Tablet(s) Oral daily  tamsulosin 0.4 milliGRAM(s) Oral at bedtime    MEDICATIONS  (PRN):  ALPRAZolam 0.25 milliGRAM(s) Oral two times a day PRN anxiety  bisacodyl 5 milliGRAM(s) Oral every 12 hours PRN Constipation  HYDROmorphone  Injectable 0.5 milliGRAM(s) IV Push every 4 hours PRN breakthrough pain on floor  oxyCODONE    IR 5 milliGRAM(s) Oral every 4 hours PRN Moderate Pain (4 - 6)  oxyCODONE    IR 10 milliGRAM(s) Oral every 4 hours PRN Severe Pain (7 - 10)      Labs:                        12.7   6.63  )-----------( 375      ( 2024 05:30 )             36.5     07-22    136  |  101  |  14  ----------------------------<  91  4.4   |  26  |  0.79    Ca    8.6      2024 05:30      PT/INR - ( 2024 05:30 )   PT: 11.1 sec;   INR: 0.97          PTT - ( 2024 05:30 )  PTT:27.6 sec  Urinalysis Basic - ( 2024 05:30 )    Color: x / Appearance: x / SG: x / pH: x  Gluc: 91 mg/dL / Ketone: x  / Bili: x / Urobili: x   Blood: x / Protein: x / Nitrite: x   Leuk Esterase: x / RBC: x / WBC x   Sq Epi: x / Non Sq Epi: x / Bacteria: x    Radiology: Reviewed    CT LEFT LOWER EXTREMITY with IV contrast (24):  Comminuted, markedly angulated left proximal femoral shaft fracture which is likely pathologic in etiology given the permeative appearance of the surrounding bone and periosteal reaction. There is marked adjacent soft tissue density centered within the vastus intermedius muscle which may be related to hematoma and/or a soft tissue mass. Correlate for any known malignant history.    CT C/A/P (24):   Displaced left femoral fracture. Please refer to the separate report of CT of the lower extremity. No other suspicious bony lesions.  Enlarged prostate. Fiducials in place.  No evidence of metastatic disease in the chest, abdomen and pelvis.  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    MR left femur w/wo contrast (24):  1.  Changes are again seen most consistent with pathologic fracture of the left mid femur with displacement.  2.  There is surrounding heterogeneous soft tissue is some enhancement concerning for mass with permeative marrow changes extending into the lesser trochanter and mid to distal 3rd of the femoral diaphysis.  3.  Somewhat bandlike edema of the left iliac wing without significant enhancement may favor stress reaction as opposed additional lesion.  4.  Clinical and laboratory correlation is suggested. Consider histologic correlation and/or PET CT for furtherevaluation as warranted.    NM Bone imaging total (7/15/24):   Increased uptake at the site of known fracture at the proximal left femur. No scintigraphic evidence of osteoblastic metastatic disease.

## 2024-07-22 NOTE — PROGRESS NOTE ADULT - SUBJECTIVE AND OBJECTIVE BOX
Ortho Note    Subjective:  Pt seen and examined today, Patient reported left hip pain, pain controlled with current pain medication regimen   Denies CP, SOB, N/V, numbness/tingling   Reviewed plan of care with patient and daughter at bedside    Vital Signs Last 24 Hrs  T(C): 36.7 (07-22-24 @ 08:47), Max: 36.7 (07-22-24 @ 08:47)  T(F): 98.1 (07-22-24 @ 08:47), Max: 98.1 (07-22-24 @ 08:47)  HR: 71 (07-22-24 @ 08:47) (58 - 71)  BP: 129/71 (07-22-24 @ 08:47) (129/71 - 143/74)  BP(mean): --  RR: 16 (07-22-24 @ 08:47) (16 - 18)  SpO2: 95% (07-22-24 @ 08:47) (93% - 95%)  AVSS    Objective:      Physical Exam:  General: Pt Alert and oriented, NAD  - Left Hip Dressing CDI   Pulses: +2 DP pulses, wpw toes   Sensation: silt intact bilateral LE  Motor: EHL/FHL/TA/GS- firing bilateral lower extremities            Plan of Care:  A/P: 79yMale s/p LEFT Femur Pathological Fx Biopsy and Provisional ORIF on 7/16 by Dr. 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

## 2024-07-22 NOTE — BRIEF OPERATIVE NOTE - NSICDXBRIEFPOSTOP_GEN_ALL_CORE_FT
POST-OP DIAGNOSIS:  Pathological fracture of femur 16-Jul-2024 21:45:13  Bryanna George  
POST-OP DIAGNOSIS:  Pathological fracture of femur 16-Jul-2024 21:45:13  Bryanna George

## 2024-07-22 NOTE — BRIEF OPERATIVE NOTE - OPERATION/FINDINGS
Open Biopsy of Pathological Femur Fx through lateral approach  Inconclusive frozen section results intraop  Provisional ORIF with 6-hole 5.0mm Plate  Definitive Fixation vs. Resection/Reconstruction pending final pathology
see op note

## 2024-07-22 NOTE — PROGRESS NOTE ADULT - SUBJECTIVE AND OBJECTIVE BOX
INTERVAL HPI/OVERNIGHT EVENTS: sravanthi o/n    SUBJECTIVE: Patient seen and examined at bedside. Pt's wife and daughter also present at bedside    Reports some discomfort in b/l thigh that has been persistent. Also reporting point tenderness in L lateral buttock area. Denies any numbness, fever, chills, sweats. Has been eating. Had BM on Fri and daily since - no N/V/Abd pain. Denies any chest pain or dyspnea    OBJECTIVE:    VITAL SIGNS:  ICU Vital Signs Last 24 Hrs  T(C): 36.7 (22 Jul 2024 08:47), Max: 36.9 (21 Jul 2024 20:10)  T(F): 98.1 (22 Jul 2024 08:47), Max: 98.5 (21 Jul 2024 20:10)  HR: 69 (22 Jul 2024 11:00) (58 - 71)  BP: 126/72 (22 Jul 2024 11:00) (112/65 - 143/74)  BP(mean): --  ABP: --  ABP(mean): --  RR: 16 (22 Jul 2024 11:00) (16 - 18)  SpO2: 96% (22 Jul 2024 11:00) (93% - 97%)    O2 Parameters below as of 22 Jul 2024 11:00  Patient On (Oxygen Delivery Method): room air              07-21 @ 07:01  -  07-22 @ 07:00  --------------------------------------------------------  IN: 0 mL / OUT: 1910 mL / NET: -1910 mL      CAPILLARY BLOOD GLUCOSE          PHYSICAL EXAM:  GEN: Male in NAD on RA  HEENT: NC/AT, MMM  CV: RRR, nml S1S2, no murmurs  PULM: nml effort, CTAB  ABD: Soft, mild distended, NABS, non-tender  : [place] in place  NEURO  A/O x3, moving all extremities, Sensation intact  L leg dressing c/d/i. 5/5 in plantarflex/ext b/l  PSYCH: Appropriate    MEDICATIONS:  MEDICATIONS  (STANDING):  acetaminophen     Tablet .. 1000 milliGRAM(s) Oral every 8 hours  amLODIPine   Tablet 10 milliGRAM(s) Oral daily  atenolol  Tablet 25 milliGRAM(s) Oral daily  atorvastatin 40 milliGRAM(s) Oral at bedtime  bisacodyl Suppository 10 milliGRAM(s) Rectal daily  chlorhexidine 2% Cloths 1 Application(s) Topical every 12 hours  chlorhexidine 2% Cloths 1 Application(s) Topical every 12 hours  cholecalciferol 2000 Unit(s) Oral daily  lactated ringers. 1000 milliLiter(s) (70 mL/Hr) IV Continuous <Continuous>  lactulose Syrup 20 Gram(s) Oral daily  polyethylene glycol 3350 17 Gram(s) Oral daily  povidone iodine 5% Nasal Swab 1 Application(s) Both Nostrils once  povidone iodine 5% Nasal Swab 1 Application(s) Both Nostrils once  senna 1 Tablet(s) Oral daily  tamsulosin 0.4 milliGRAM(s) Oral at bedtime    MEDICATIONS  (PRN):  ALPRAZolam 0.25 milliGRAM(s) Oral two times a day PRN anxiety  bisacodyl 5 milliGRAM(s) Oral every 12 hours PRN Constipation  HYDROmorphone  Injectable 0.5 milliGRAM(s) IV Push every 4 hours PRN breakthrough pain on floor  oxyCODONE    IR 5 milliGRAM(s) Oral every 4 hours PRN Moderate Pain (4 - 6)  oxyCODONE    IR 10 milliGRAM(s) Oral every 4 hours PRN Severe Pain (7 - 10)      ALLERGIES:  Allergies    No Known Allergies    Intolerances        LABS:                        12.7   6.63  )-----------( 375      ( 22 Jul 2024 05:30 )             36.5     07-22    136  |  101  |  14  ----------------------------<  91  4.4   |  26  |  0.79    Ca    8.6      22 Jul 2024 05:30      PT/INR - ( 22 Jul 2024 05:30 )   PT: 11.1 sec;   INR: 0.97          PTT - ( 22 Jul 2024 05:30 )  PTT:27.6 sec  Urinalysis Basic - ( 22 Jul 2024 05:30 )    Color: x / Appearance: x / SG: x / pH: x  Gluc: 91 mg/dL / Ketone: x  / Bili: x / Urobili: x   Blood: x / Protein: x / Nitrite: x   Leuk Esterase: x / RBC: x / WBC x   Sq Epi: x / Non Sq Epi: x / Bacteria: x        RADIOLOGY & ADDITIONAL TESTS: Reviewed.

## 2024-07-22 NOTE — PRE-ANESTHESIA EVALUATION ADULT - NSANTHOSAYNRD_GEN_A_CORE
No. ZELDA screening performed.  STOP BANG Legend: 0-2 = LOW Risk; 3-4 = INTERMEDIATE Risk; 5-8 = HIGH Risk
No. ZELDA screening performed.  STOP BANG Legend: 0-2 = LOW Risk; 3-4 = INTERMEDIATE Risk; 5-8 = HIGH Risk

## 2024-07-22 NOTE — CONSULT NOTE ADULT - NS ATTEST RISK PROBLEM GEN_ALL_CORE FT
B cell lymphoma  Pathologic fracture B cell lymphoma  Pathologic fracture  All images reviewed independently

## 2024-07-22 NOTE — CONSULT NOTE ADULT - ASSESSMENT
******INCOMPLETE******  ******INCOMPLETE******  ******INCOMPLETE******  ******INCOMPLETE******      Heme/onc will continue to follow. Recommendations final upon attending physician attestation.  79M w HTN, HLD, Prostate CA s/p XRT w chronic L leg pain for months p/w fall after L leg gave out on him, found to have proximal femoral shaft fracture, s/p biopsy and provisional ORIF Dr. Shaw 7/16. RTOR 7/22 for IM nailing. Heme/onc consulted for pathology findings showing new B cell lymphoma.      ******INCOMPLETE******  ******INCOMPLETE******  ******INCOMPLETE******  ******INCOMPLETE******      Heme/onc will continue to follow. Recommendations final upon attending physician attestation.  79M w HTN, HLD, Prostate CA s/p XRT w chronic L leg pain for months p/w fall after L leg gave out on him, found to have proximal femoral shaft fracture, s/p biopsy and provisional ORIF Dr. Shaw 7/16. RTOR 7/22 for IM nailing. Heme/onc consulted for pathology findings showing new B cell lymphoma.        Surgical path from L femur fx (7/16/24) showing predominantly necrotic tissue with lymphoproliferative proliferation compatible with B-cell lymphoma.    #B-cell lymphoma  -     ******INCOMPLETE******  ******INCOMPLETE******  ******INCOMPLETE******  ******INCOMPLETE******      Heme/onc will continue to follow. Recommendations final upon attending physician attestation.  79M w HTN, HLD, Prostate CA s/p XRT w chronic L leg pain for months p/w fall after L leg gave out on him, found to have proximal femoral shaft fracture, s/p biopsy and provisional ORIF Dr. Shaw 7/16. RTOR 7/22 for IM nailing. Heme/onc consulted for pathology findings showing new B cell lymphoma.        Surgical path from L femur fx (7/16/24) showing predominantly necrotic tissue with lymphoproliferative proliferation compatible with B-cell lymphoma.    #B-cell lymphoma  - recommend inpatient PET-CT for further evaluation of shraddha disease    ******INCOMPLETE******  ******INCOMPLETE******  ******INCOMPLETE******  ******INCOMPLETE******      Heme/onc will continue to follow. Recommendations final upon attending physician attestation.  79M PMH HTN, HLD, prostate CA (definitive course SBRT, completed 6/13/18), who presents with 4 months of progressive left leg pain followed by mechanical fall, found to have evidence of pathologic L femur fracture and now s/p provisional ORIF with biopsy on 7/16. Hematology/oncology consulted for new pathology findings of B-cell lymphoma.     #B-cell lymphoma  #pathologic L femur fracture  Surgical path from L femur fx (7/16/24) showing predominantly necrotic tissue with lymphoproliferative proliferation compatible with B-cell lymphoma.  Additional pathology testing, including FISH analysis, pending.  Current imaging with CT C/A/P not showing obvious metastatic disease, however, this does not rule out presence of shraddha involvement.    Plan:  - will await finalized pathology report  - recommend inpatient PET-CT for further evaluation of shraddha disease    Heme/onc will continue to follow. Recommendations final upon attending physician attestation.  79M PMH HTN, HLD, prostate CA (definitive course SBRT, completed 6/13/18), who presents with 4 months of progressive left leg pain followed by mechanical fall, found to have evidence of pathologic L femur fracture and now s/p provisional ORIF with biopsy on 7/16. Hematology/oncology consulted for new pathology findings of B-cell lymphoma.     #B-cell lymphoma  #pathologic L femur fracture  Surgical path from L femur fx (7/16/24) showing predominantly necrotic tissue with lymphoproliferative proliferation compatible with B-cell lymphoma.  Additional pathology testing, including FISH immunostaining analysis, pending.  Current imaging with CT C/A/P not showing obvious metastatic disease, however, this does not rule out presence of shraddha involvement.    Plan:  - will await finalized pathology report  - recommend inpatient PET-CT for further evaluation of shraddha disease    Heme/onc will continue to follow. Recommendations final upon attending physician attestation.  79M PMH HTN, HLD, prostate CA (definitive course SBRT, completed 6/13/18), who presents with 4 months of progressive left leg pain followed by mechanical fall, found to have evidence of pathologic L femur fracture and now s/p provisional ORIF with biopsy on 7/16. Hematology/oncology consulted for new pathology findings of B-cell lymphoma.     #B-cell lymphoma  #pathologic L femur fracture  Surgical path from L femur fx (7/16/24) showing predominantly necrotic tissue with lymphoproliferative proliferation compatible with B-cell lymphoma.  Additional pathology testing, including FISH immunostaining analysis, pending.  Current imaging with CT, MR, bone imaging not showing obvious metastatic disease, however, this does not rule out presence of shraddha involvement.    Plan:  - will await finalized pathology report  - recommend inpatient PET-CT for further evaluation of shraddha disease    Heme/onc will continue to follow. Recommendations final upon attending physician attestation.  79M PMH HTN, HLD, prostate CA (definitive course SBRT & Cyberknife, completed 6/13/18), who presents with 4 months of progressive left leg pain followed by mechanical fall, found to have evidence of pathologic L femur fracture and now s/p provisional ORIF with biopsy on 7/16. Hematology/oncology consulted for new pathology findings of B-cell lymphoma.     #B-cell lymphoma  #pathologic L femur fracture  Surgical path from L femur fx (7/16/24) showing predominantly necrotic tissue with lymphoproliferative proliferation compatible with B-cell lymphoma.  Additional pathology testing, including FISH immunostaining analysis, pending.  Current imaging with CT, MR, bone imaging not showing obvious metastatic disease, however, this does not rule out presence of shraddha involvement.    Plan:  - will await finalized pathology report  - please obtain Hepatitis C Ab & Hepatitis B core Ab, surface Ab, surface antigen  - recommend inpatient PET-CT for further evaluation of shraddha disease    Heme/onc will continue to follow. Recommendations final upon attending physician attestation.  79M PMH HTN, HLD, prostate CA (definitive course SBRT & Cyberknife, completed 6/13/18), who presents with 4 months of progressive left leg pain followed by mechanical fall, found to have evidence of pathologic L femur fracture and now s/p provisional ORIF with biopsy on 7/16. Hematology/oncology consulted for new pathology findings of B-cell lymphoma.     #B-cell lymphoma  #pathologic L femur fracture  Surgical path from L femur fx (7/16/24) showing predominantly necrotic tissue with lymphoproliferative proliferation compatible with B-cell lymphoma.  Additional pathology testing, including FISH immunostaining analysis, pending.  Current imaging with CT, MR, bone imaging not showing obvious metastatic disease, however, this does not rule out presence of shraddha involvement.  Elevated LDH with highly necrotic tissue is concerning for higher grade tumor.    Plan:  - will await finalized pathology report  - please obtain Hepatitis C Ab & Hepatitis B core Ab, surface Ab, surface antigen  - recommend inpatient PET-CT for further evaluation of shraddha disease    Heme/onc will continue to follow. Recommendations final upon attending physician attestation.

## 2024-07-22 NOTE — BRIEF OPERATIVE NOTE - NSICDXBRIEFPREOP_GEN_ALL_CORE_FT
PRE-OP DIAGNOSIS:  Pathological fracture of femur 16-Jul-2024 21:45:06  Bryanna George  
PRE-OP DIAGNOSIS:  Pathological fracture of femur 16-Jul-2024 21:45:06  Bryanna George

## 2024-07-22 NOTE — PROGRESS NOTE ADULT - SUBJECTIVE AND OBJECTIVE BOX
Ortho Note    Subjective:  Pt comfortable without complaints, pain controlled with current pain medication regimen. C/o RLE Cramping  Denies CP, SOB, N/V, numbness/tingling     Vital Signs Last 24 Hrs  T(C): 36.6 (22 Jul 2024 05:35), Max: 36.9 (21 Jul 2024 20:10)  T(F): 97.9 (22 Jul 2024 05:35), Max: 98.5 (21 Jul 2024 20:10)  HR: 58 (22 Jul 2024 05:35) (58 - 64)  BP: 143/74 (22 Jul 2024 05:35) (112/65 - 143/74)  BP(mean): --  RR: 18 (22 Jul 2024 05:35) (16 - 18)  SpO2: 93% (22 Jul 2024 05:35) (93% - 97%)    Parameters below as of 22 Jul 2024 05:35  Patient On (Oxygen Delivery Method): room air    Physical Exam:  General: Pt Alert and oriented, NAD  - Left Hip Dressing CDI   Pulses: +2 DP pulses, wpw toes   Sensation: silt intact bilateral LE  Motor: EHL/FHL/TA/GS- firing bilateral lower extremities    Plan of Care:  A/P: 79yMale s/p LEFT Femur Pathological Fx Biopsy and Provisional ORIF on 7/16 by Dr. SHEILA Shaw, plan for LEFT Femur JOLLY and IMN on 7/22  - afebrile, wbcs 7.86  - Pain Control- tylenol 1000mg PO Q8h, Oxycodone 5-10mg PO Q4h prn moderate to severe pain   - DVT ppx: Lovenox 40mg SubQ Q24h   - PT, WBS: NWB LLE, OOB to chair   - appreciate medicine recs  - awaiting pathology biopsy- pending final report  - Dispo- OR on Monday 7/22 w/Dr. Shaw  - NPO for OR Today    Ortho Pager [phone]

## 2024-07-22 NOTE — PRE-ANESTHESIA EVALUATION ADULT - NSANTHAGERD_ENT_A_CORE
well developed, well nourished , in no acute distress , ambulating without difficulty , normal communication ability
Yes
Yes

## 2024-07-23 LAB
ADD ON TEST-SPECIMEN IN LAB: SIGNIFICANT CHANGE UP
ALBUMIN SERPL ELPH-MCNC: 3.1 G/DL — LOW (ref 3.3–5)
ALP SERPL-CCNC: 98 U/L — SIGNIFICANT CHANGE UP (ref 40–120)
ALT FLD-CCNC: 56 U/L — HIGH (ref 10–45)
ANION GAP SERPL CALC-SCNC: 7 MMOL/L — SIGNIFICANT CHANGE UP (ref 5–17)
AST SERPL-CCNC: 83 U/L — HIGH (ref 10–40)
BILIRUB DIRECT SERPL-MCNC: <0.2 MG/DL — SIGNIFICANT CHANGE UP (ref 0–0.3)
BILIRUB INDIRECT FLD-MCNC: SIGNIFICANT CHANGE UP (ref 0.2–1)
BILIRUB SERPL-MCNC: 0.7 MG/DL — SIGNIFICANT CHANGE UP (ref 0.2–1.2)
BUN SERPL-MCNC: 18 MG/DL — SIGNIFICANT CHANGE UP (ref 7–23)
CALCIUM SERPL-MCNC: 8.4 MG/DL — SIGNIFICANT CHANGE UP (ref 8.4–10.5)
CHLORIDE SERPL-SCNC: 98 MMOL/L — SIGNIFICANT CHANGE UP (ref 96–108)
CO2 SERPL-SCNC: 27 MMOL/L — SIGNIFICANT CHANGE UP (ref 22–31)
CREAT SERPL-MCNC: 0.84 MG/DL — SIGNIFICANT CHANGE UP (ref 0.5–1.3)
EGFR: 88 ML/MIN/1.73M2 — SIGNIFICANT CHANGE UP
GLUCOSE SERPL-MCNC: 101 MG/DL — HIGH (ref 70–99)
HBV CORE AB SER-ACNC: SIGNIFICANT CHANGE UP
HBV SURFACE AB SER-ACNC: SIGNIFICANT CHANGE UP
HBV SURFACE AG SER-ACNC: SIGNIFICANT CHANGE UP
HCT VFR BLD CALC: 36.7 % — LOW (ref 39–50)
HCV AB S/CO SERPL IA: 0.05 S/CO — SIGNIFICANT CHANGE UP
HCV AB SERPL-IMP: SIGNIFICANT CHANGE UP
HGB BLD-MCNC: 12.4 G/DL — LOW (ref 13–17)
HIV 1+2 AB+HIV1 P24 AG SERPL QL IA: SIGNIFICANT CHANGE UP
LDH SERPL L TO P-CCNC: 544 U/L — HIGH (ref 50–242)
MCHC RBC-ENTMCNC: 31.3 PG — SIGNIFICANT CHANGE UP (ref 27–34)
MCHC RBC-ENTMCNC: 33.8 GM/DL — SIGNIFICANT CHANGE UP (ref 32–36)
MCV RBC AUTO: 92.7 FL — SIGNIFICANT CHANGE UP (ref 80–100)
NRBC # BLD: 0 /100 WBCS — SIGNIFICANT CHANGE UP (ref 0–0)
PHOSPHATE SERPL-MCNC: 3.7 MG/DL — SIGNIFICANT CHANGE UP (ref 2.5–4.5)
PLATELET # BLD AUTO: 390 K/UL — SIGNIFICANT CHANGE UP (ref 150–400)
POTASSIUM SERPL-MCNC: 4.2 MMOL/L — SIGNIFICANT CHANGE UP (ref 3.5–5.3)
POTASSIUM SERPL-SCNC: 4.2 MMOL/L — SIGNIFICANT CHANGE UP (ref 3.5–5.3)
PROT SERPL-MCNC: 5.6 G/DL — LOW (ref 6–8.3)
RBC # BLD: 3.96 M/UL — LOW (ref 4.2–5.8)
RBC # FLD: 13.7 % — SIGNIFICANT CHANGE UP (ref 10.3–14.5)
SODIUM SERPL-SCNC: 132 MMOL/L — LOW (ref 135–145)
URATE SERPL-MCNC: 3.5 MG/DL — SIGNIFICANT CHANGE UP (ref 3.4–8.8)
WBC # BLD: 9.32 K/UL — SIGNIFICANT CHANGE UP (ref 3.8–10.5)
WBC # FLD AUTO: 9.32 K/UL — SIGNIFICANT CHANGE UP (ref 3.8–10.5)

## 2024-07-23 PROCEDURE — 99233 SBSQ HOSP IP/OBS HIGH 50: CPT

## 2024-07-23 RX ORDER — CYCLOBENZAPRINE HCL 10 MG
10 TABLET ORAL THREE TIMES A DAY
Refills: 0 | Status: DISCONTINUED | OUTPATIENT
Start: 2024-07-23 | End: 2024-07-29

## 2024-07-23 RX ADMIN — Medication 1000 MILLIGRAM(S): at 22:36

## 2024-07-23 RX ADMIN — OXYCODONE HYDROCHLORIDE 10 MILLIGRAM(S): 30 TABLET ORAL at 07:49

## 2024-07-23 RX ADMIN — OXYCODONE HYDROCHLORIDE 10 MILLIGRAM(S): 30 TABLET ORAL at 22:36

## 2024-07-23 RX ADMIN — ATENOLOL 25 MILLIGRAM(S): 100 TABLET ORAL at 05:25

## 2024-07-23 RX ADMIN — Medication 0.5 MILLIGRAM(S): at 13:05

## 2024-07-23 RX ADMIN — OXYCODONE HYDROCHLORIDE 10 MILLIGRAM(S): 30 TABLET ORAL at 02:11

## 2024-07-23 RX ADMIN — OXYCODONE HYDROCHLORIDE 10 MILLIGRAM(S): 30 TABLET ORAL at 11:50

## 2024-07-23 RX ADMIN — Medication 100 MILLIGRAM(S): at 00:03

## 2024-07-23 RX ADMIN — Medication 0.5 MILLIGRAM(S): at 03:45

## 2024-07-23 RX ADMIN — OXYCODONE HYDROCHLORIDE 10 MILLIGRAM(S): 30 TABLET ORAL at 08:22

## 2024-07-23 RX ADMIN — Medication 5 MILLIGRAM(S): at 07:09

## 2024-07-23 RX ADMIN — Medication 1000 MILLIGRAM(S): at 05:25

## 2024-07-23 RX ADMIN — Medication 0.5 MILLIGRAM(S): at 12:52

## 2024-07-23 RX ADMIN — Medication 10 MILLIGRAM(S): at 12:53

## 2024-07-23 RX ADMIN — Medication 1000 MILLIGRAM(S): at 15:20

## 2024-07-23 RX ADMIN — OXYCODONE HYDROCHLORIDE 10 MILLIGRAM(S): 30 TABLET ORAL at 06:49

## 2024-07-23 RX ADMIN — ENOXAPARIN SODIUM 40 MILLIGRAM(S): 120 INJECTION SUBCUTANEOUS at 05:25

## 2024-07-23 RX ADMIN — OXYCODONE HYDROCHLORIDE 10 MILLIGRAM(S): 30 TABLET ORAL at 10:50

## 2024-07-23 RX ADMIN — ATORVASTATIN CALCIUM 40 MILLIGRAM(S): 40 TABLET, FILM COATED ORAL at 22:36

## 2024-07-23 RX ADMIN — AMLODIPINE BESYLATE 10 MILLIGRAM(S): 2.5 TABLET ORAL at 05:25

## 2024-07-23 RX ADMIN — Medication 1000 MILLIGRAM(S): at 14:29

## 2024-07-23 RX ADMIN — Medication 0.4 MILLIGRAM(S): at 22:36

## 2024-07-23 RX ADMIN — Medication 20 GRAM(S): at 12:52

## 2024-07-23 RX ADMIN — OXYCODONE HYDROCHLORIDE 10 MILLIGRAM(S): 30 TABLET ORAL at 23:36

## 2024-07-23 RX ADMIN — Medication 10 MILLIGRAM(S): at 23:28

## 2024-07-23 RX ADMIN — Medication 100 MILLIGRAM(S): at 06:56

## 2024-07-23 RX ADMIN — Medication 0.5 MILLIGRAM(S): at 03:13

## 2024-07-23 RX ADMIN — Medication 2000 UNIT(S): at 12:53

## 2024-07-23 RX ADMIN — OXYCODONE HYDROCHLORIDE 10 MILLIGRAM(S): 30 TABLET ORAL at 03:11

## 2024-07-23 NOTE — PROGRESS NOTE ADULT - SUBJECTIVE AND OBJECTIVE BOX
INTERVAL HPI/OVERNIGHT EVENTS: sravanthi o/n    SUBJECTIVE: Patient seen and examined at bedside.   Feels well. Pain is controlled. Condom cath in place. Denies LH/dizziness, chest pain, dyspnea. Eating - feels he is eating slightly more than previously. Denies any fever.    OBJECTIVE:    VITAL SIGNS:  ICU Vital Signs Last 24 Hrs  T(C): 36.7 (23 Jul 2024 09:03), Max: 37.2 (22 Jul 2024 21:19)  T(F): 98.1 (23 Jul 2024 09:03), Max: 98.9 (22 Jul 2024 21:19)  HR: 71 (23 Jul 2024 09:03) (71 - 98)  BP: 117/65 (23 Jul 2024 09:03) (117/65 - 149/79)  BP(mean): 108 (22 Jul 2024 20:37) (98 - 108)  ABP: --  ABP(mean): --  RR: 18 (23 Jul 2024 09:03) (17 - 25)  SpO2: 95% (23 Jul 2024 09:03) (94% - 98%)    O2 Parameters below as of 23 Jul 2024 09:03  Patient On (Oxygen Delivery Method): room air              07-22 @ 07:01  -  07-23 @ 07:00  --------------------------------------------------------  IN: 0 mL / OUT: 550 mL / NET: -550 mL      CAPILLARY BLOOD GLUCOSE          PHYSICAL EXAM:  GEN: Male in NAD on RA  HEENT: NC/AT, MMM  CV: RRR, nml S1S2, no murmurs  PULM: nml effort, CTAB  ABD: Soft, mild distended, NABS, non-tender  NEURO  A/O x3, moving all extremities, Sensation intact  L leg dressing c/d/i. 5/5 in plantarflex/ext b/l  PSYCH: Appropriate      MEDICATIONS:  MEDICATIONS  (STANDING):  acetaminophen     Tablet .. 1000 milliGRAM(s) Oral every 8 hours  amLODIPine   Tablet 10 milliGRAM(s) Oral daily  atenolol  Tablet 25 milliGRAM(s) Oral daily  atorvastatin 40 milliGRAM(s) Oral at bedtime  bisacodyl Suppository 10 milliGRAM(s) Rectal daily  cholecalciferol 2000 Unit(s) Oral daily  enoxaparin Injectable 40 milliGRAM(s) SubCutaneous every 24 hours  lactated ringers. 1000 milliLiter(s) (70 mL/Hr) IV Continuous <Continuous>  lactulose Syrup 20 Gram(s) Oral daily  polyethylene glycol 3350 17 Gram(s) Oral daily  povidone iodine 5% Nasal Swab 1 Application(s) Both Nostrils once  senna 1 Tablet(s) Oral daily  tamsulosin 0.4 milliGRAM(s) Oral at bedtime    MEDICATIONS  (PRN):  ALPRAZolam 0.25 milliGRAM(s) Oral two times a day PRN anxiety  bisacodyl 5 milliGRAM(s) Oral every 12 hours PRN Constipation  cyclobenzaprine 10 milliGRAM(s) Oral three times a day PRN Muscle Spasm  HYDROmorphone  Injectable 0.5 milliGRAM(s) IV Push every 4 hours PRN breakthrough pain on floor  oxyCODONE    IR 5 milliGRAM(s) Oral every 4 hours PRN Moderate Pain (4 - 6)  oxyCODONE    IR 10 milliGRAM(s) Oral every 4 hours PRN Severe Pain (7 - 10)      ALLERGIES:  Allergies    No Known Allergies    Intolerances        LABS:                        12.4   9.32  )-----------( 390      ( 23 Jul 2024 07:06 )             36.7     07-23    132<L>  |  98  |  18  ----------------------------<  101<H>  4.2   |  27  |  0.84    Ca    8.4      23 Jul 2024 07:06  Phos  3.7     07-23    TPro  5.6<L>  /  Alb  3.1<L>  /  TBili  0.7  /  DBili  <0.2  /  AST  83<H>  /  ALT  56<H>  /  AlkPhos  98  07-23    PT/INR - ( 22 Jul 2024 05:30 )   PT: 11.1 sec;   INR: 0.97          PTT - ( 22 Jul 2024 05:30 )  PTT:27.6 sec  Urinalysis Basic - ( 23 Jul 2024 07:06 )    Color: x / Appearance: x / SG: x / pH: x  Gluc: 101 mg/dL / Ketone: x  / Bili: x / Urobili: x   Blood: x / Protein: x / Nitrite: x   Leuk Esterase: x / RBC: x / WBC x   Sq Epi: x / Non Sq Epi: x / Bacteria: x        RADIOLOGY & ADDITIONAL TESTS: Reviewed.

## 2024-07-23 NOTE — PROGRESS NOTE ADULT - SUBJECTIVE AND OBJECTIVE BOX
POST OPERATIVE DAY # 1 left femur IM nail for left pathologic femoral shaft fracture, found to be B cell lymphoma   SUBJECTIVE: Patient seen and examined.  Pt without complaints. Notes incision site has some dull pain and muscle spasms to his leg.   Denies chest pain/SOB/dizziness/n/v/HA   Pain well controlled.       OBJECTIVE:     Vital Signs Last 24 Hrs  T(C): 36.7 (23 Jul 2024 09:03), Max: 37.2 (22 Jul 2024 21:19)  T(F): 98.1 (23 Jul 2024 09:03), Max: 98.9 (22 Jul 2024 21:19)  HR: 71 (23 Jul 2024 09:03) (60 - 98)  BP: 117/65 (23 Jul 2024 09:03) (104/58 - 149/79)  BP(mean): 108 (22 Jul 2024 20:37) (76 - 116)  RR: 18 (23 Jul 2024 09:03) (12 - 26)  SpO2: 95% (23 Jul 2024 09:03) (94% - 98%)    Parameters below as of 23 Jul 2024 09:03  Patient On (Oxygen Delivery Method): room air        PE:  Pleasant, alert, oriented, comfortable, visiting with family           Dressing: clean/dry/intact- gauze/tegaderm/ace wrap          Sensation: intact to light touch to patient's baseline BLE          Motor exam:  firing ehl/ta/gs/fhl BLE          Skin warm, well-perfused; capillary refill brisk BLE              LABS:                        12.4   9.32  )-----------( 390      ( 23 Jul 2024 07:06 )             36.7     07-23    132<L>  |  98  |  18  ----------------------------<  101<H>  4.2   |  27  |  0.84    Ca    8.4      23 Jul 2024 07:06  Phos  3.7     07-23  TPro  5.6<L>  /  Alb  3.1<L>  /  TBili  0.7  /  DBili  <0.2  /  AST  83<H>  /  ALT  56<H>  /  AlkPhos  98  07-23  PT/INR - ( 22 Jul 2024 05:30 )   PT: 11.1 sec;   INR: 0.97      PTT - ( 22 Jul 2024 05:30 )  PTT:27.6 sec  Urinalysis Basic - ( 23 Jul 2024 07:06 )  Color: x / Appearance: x / SG: x / pH: x  Gluc: 101 mg/dL / Ketone: x  / Bili: x / Urobili: x   Blood: x / Protein: x / Nitrite: x   Leuk Esterase: x / RBC: x / WBC x   Sq Epi: x / Non Sq Epi: x / Bacteria: x    ASSESSMENT AND PLAN: 79M with left femur pathologic fracture, now POD#1 left femur IM nail, awaiting development of treatment plan for lymphoma  1. Stable, Appreciate Heme/Onc and Medicine recommendations. Currently awaiting PET Scan and bloodwork (hepatitis labs) per Heme Onc. Per NM, PET Scan will happen overnight/tomorrow AM. He needs to be NPO 4hrs prior to PET scan. Pt aware of plan.   2. Analgesic pain control  3. DVT prophylaxis: SCDs, Lovenox   4. Weight Bearing Status:  WBAT LLE, ROMAT   5. Disposition: pending development of lymphoma treatment plan and PT re-eval today

## 2024-07-23 NOTE — PROGRESS NOTE ADULT - SUBJECTIVE AND OBJECTIVE BOX
Ortho Note    Subjective:  Pt comfortable without complaints. Still c/o RLE Cramping.  Denies CP, SOB, N/V, numbness/tingling     Vital Signs Last 24 Hrs  T(C): 36.6 (23 Jul 2024 05:23), Max: 37.2 (22 Jul 2024 21:19)  T(F): 97.9 (23 Jul 2024 05:23), Max: 98.9 (22 Jul 2024 21:19)  HR: 81 (23 Jul 2024 05:23) (60 - 98)  BP: 134/74 (23 Jul 2024 05:23) (104/58 - 149/79)  BP(mean): 108 (22 Jul 2024 20:37) (76 - 116)  RR: 17 (23 Jul 2024 05:23) (12 - 26)  SpO2: 98% (23 Jul 2024 05:23) (94% - 98%)    Parameters below as of 23 Jul 2024 05:23  Patient On (Oxygen Delivery Method): room air      Physical Exam:  General: Pt Alert and oriented, NAD  - Left Hip Dressing CDI   ACE wrap  Pulses: +2 DP pulses, wpw toes   Sensation: silt intact bilateral LE  Motor: EHL/FHL/TA/GS- firing bilateral lower extremities    Plan of Care:  A/P: 79yMale s/p LEFT Femur Pathological Fx Biopsy and Provisional ORIF on 7/16 and LEFT Femur JOLLY and IMN on 7/22 by Dr. Shaw  - Stable  - Pain control; added flexeril for muscle spasms  - DVT ppx: Lovenox 40mg SubQ Q24h   - PT, WBS: WBAT LLE, ROMAT  - F/u Med Onc Recs - Final pathology B-Cell Lymphoma  - F/u hospitalist Recs  - Dispo - Pending PT, Med Onc Recs about inpatient vs. outpatient lymphoma treatment  - PT Eval today    Ortho Pager [phone]

## 2024-07-23 NOTE — PROGRESS NOTE ADULT - SUBJECTIVE AND OBJECTIVE BOX
Ortho Post Op Check    Procedure: Left Femur IMN  Surgeon:     Pt comfortable, pain controlled, some subjective irritation in the left inner thigh under ACE wrap.  Denies CP, SOB, N/V, numbness/tingling     Vital Signs Last 24 Hrs  T(C): 36.6 (07-23-24 @ 00:15), Max: 37.2 (07-22-24 @ 21:19)  T(F): 97.9 (07-23-24 @ 00:15), Max: 98.9 (07-22-24 @ 21:19)  HR: 95 (07-23-24 @ 00:15) (95 - 96)  BP: 126/69 (07-23-24 @ 00:15) (126/69 - 128/76)  BP(mean): --  RR: 17 (07-23-24 @ 00:15) (17 - 17)  SpO2: 95% (07-23-24 @ 00:15) (95% - 95%)    General: Pt Alert and oriented, NAD  DSG C/D/I, ACE wrap  Skin under proximal ACE without irritation    LLE:  SILT SPN/DPN/Saph/Jessica/Tib  Motor 5/5 TA/GS/EHL/FHL  Pulses 2+      Post-op X-Ray: Left Femur IMN in place, fracture well aligned    A/P: 79yMale s/p Left Femur IMN by Dr. SHEILA Shaw on 07-22  - Stable  - Pain Control  - DVT ppx: SCDs, LVX  - Post op abx: Ancef  - WBS: WBAT LLE  - PT eval pending    Ortho Pager [phone]

## 2024-07-23 NOTE — PROGRESS NOTE ADULT - ASSESSMENT
79M w HTN, HLD, Prostate CA s/p XRT w chronic L leg pain for months p/w fall after L leg gave out on him, found to have proximal femoral shaft fracture, s/p biopsy and provisional ORIF Dr. Shaw 7/16, pathology returning as B cell lymphoma - s/p IM Nail w Dr. Shaw 7/22    #B - Cell lymphoma - see below  #Constipation - Resolved    #Post-op state - pain controlled. PPx: held today for OR. On bowel regimen and incentive spirometer   - tylenol 1g q8   - PRN: Oxycodone 5/10 q4 for mod/severe pain  #L Proximal femur fracture   - CT C/A/P wo evidence of metastatic disease. Enlarged prostate wo fiducials   - MR L femur: pathologic fracture w displacement - some soft tissue present suggestive of mass w permeative marrow changes extending to lesser trochanter and mid-distal 3rd of femoral diaphysis.    - bone scan - signal at femoral shaft   - SPEP, UPEP negative for monoclonal band    #HTN - home on atenolol 25mg daily, amlodipine 10mg daily, ramipril 10mg daily  #HLD - home on lipitor 40mg daily  #Leukocytosis - resolved.   #Prostate CA - on flomax 0.4mg HS  #Vitamin D insufficiency - Suboptimal - borderline w 29.9   - increased supplementation to 2000 IU daily D3  #Hyponatremia - 132 today      Plan  Encourage PO/Fluid intake  PET-CT ordered; Can have non-urgent TTE  f/u hepatitis panel.   Continue PT post-op    Co-management will continue to follow  Above d/w orthopedics

## 2024-07-24 LAB
ANION GAP SERPL CALC-SCNC: 8 MMOL/L — SIGNIFICANT CHANGE UP (ref 5–17)
BUN SERPL-MCNC: 17 MG/DL — SIGNIFICANT CHANGE UP (ref 7–23)
CALCIUM SERPL-MCNC: 8.2 MG/DL — LOW (ref 8.4–10.5)
CHLORIDE SERPL-SCNC: 99 MMOL/L — SIGNIFICANT CHANGE UP (ref 96–108)
CO2 SERPL-SCNC: 28 MMOL/L — SIGNIFICANT CHANGE UP (ref 22–31)
CREAT SERPL-MCNC: 0.95 MG/DL — SIGNIFICANT CHANGE UP (ref 0.5–1.3)
EGFR: 80 ML/MIN/1.73M2 — SIGNIFICANT CHANGE UP
GLUCOSE BLDC GLUCOMTR-MCNC: 101 MG/DL — HIGH (ref 70–99)
GLUCOSE SERPL-MCNC: 113 MG/DL — HIGH (ref 70–99)
HCT VFR BLD CALC: 33.9 % — LOW (ref 39–50)
HGB BLD-MCNC: 11.5 G/DL — LOW (ref 13–17)
LDH SERPL L TO P-CCNC: 416 U/L — HIGH (ref 50–242)
MCHC RBC-ENTMCNC: 31.3 PG — SIGNIFICANT CHANGE UP (ref 27–34)
MCHC RBC-ENTMCNC: 33.9 GM/DL — SIGNIFICANT CHANGE UP (ref 32–36)
MCV RBC AUTO: 92.1 FL — SIGNIFICANT CHANGE UP (ref 80–100)
NRBC # BLD: 0 /100 WBCS — SIGNIFICANT CHANGE UP (ref 0–0)
PLATELET # BLD AUTO: 342 K/UL — SIGNIFICANT CHANGE UP (ref 150–400)
POTASSIUM SERPL-MCNC: 4 MMOL/L — SIGNIFICANT CHANGE UP (ref 3.5–5.3)
POTASSIUM SERPL-SCNC: 4 MMOL/L — SIGNIFICANT CHANGE UP (ref 3.5–5.3)
RBC # BLD: 3.68 M/UL — LOW (ref 4.2–5.8)
RBC # FLD: 13.6 % — SIGNIFICANT CHANGE UP (ref 10.3–14.5)
SODIUM SERPL-SCNC: 135 MMOL/L — SIGNIFICANT CHANGE UP (ref 135–145)
WBC # BLD: 9.6 K/UL — SIGNIFICANT CHANGE UP (ref 3.8–10.5)
WBC # FLD AUTO: 9.6 K/UL — SIGNIFICANT CHANGE UP (ref 3.8–10.5)

## 2024-07-24 PROCEDURE — 99232 SBSQ HOSP IP/OBS MODERATE 35: CPT

## 2024-07-24 PROCEDURE — 78815 PET IMAGE W/CT SKULL-THIGH: CPT | Mod: 26,PI

## 2024-07-24 RX ADMIN — Medication 1000 MILLIGRAM(S): at 14:39

## 2024-07-24 RX ADMIN — Medication 1000 MILLIGRAM(S): at 06:57

## 2024-07-24 RX ADMIN — Medication 2000 UNIT(S): at 18:43

## 2024-07-24 RX ADMIN — Medication 1000 MILLIGRAM(S): at 15:00

## 2024-07-24 RX ADMIN — SENNOSIDES 1 TABLET(S): 8.6 TABLET ORAL at 18:43

## 2024-07-24 RX ADMIN — ATORVASTATIN CALCIUM 40 MILLIGRAM(S): 40 TABLET, FILM COATED ORAL at 21:23

## 2024-07-24 RX ADMIN — ENOXAPARIN SODIUM 40 MILLIGRAM(S): 120 INJECTION SUBCUTANEOUS at 06:57

## 2024-07-24 RX ADMIN — Medication 1000 MILLIGRAM(S): at 21:22

## 2024-07-24 RX ADMIN — AMLODIPINE BESYLATE 10 MILLIGRAM(S): 2.5 TABLET ORAL at 06:57

## 2024-07-24 RX ADMIN — OXYCODONE HYDROCHLORIDE 10 MILLIGRAM(S): 30 TABLET ORAL at 15:39

## 2024-07-24 RX ADMIN — Medication 10 MILLIGRAM(S): at 21:22

## 2024-07-24 RX ADMIN — Medication 0.4 MILLIGRAM(S): at 21:22

## 2024-07-24 RX ADMIN — ATENOLOL 25 MILLIGRAM(S): 100 TABLET ORAL at 06:57

## 2024-07-24 RX ADMIN — OXYCODONE HYDROCHLORIDE 10 MILLIGRAM(S): 30 TABLET ORAL at 14:39

## 2024-07-24 RX ADMIN — OXYCODONE HYDROCHLORIDE 10 MILLIGRAM(S): 30 TABLET ORAL at 05:06

## 2024-07-24 RX ADMIN — OXYCODONE HYDROCHLORIDE 10 MILLIGRAM(S): 30 TABLET ORAL at 04:06

## 2024-07-24 NOTE — PROGRESS NOTE ADULT - SUBJECTIVE AND OBJECTIVE BOX
Ortho Note    Subjective:  Pt comfortable without complaints. RLE Cramping improved after flexeril. NPO for PET Scan today  Denies CP, SOB, N/V, numbness/tingling     Vital Signs Last 24 Hrs  T(C): 36.6 (24 Jul 2024 05:43), Max: 37.4 (23 Jul 2024 22:00)  T(F): 97.8 (24 Jul 2024 05:43), Max: 99.3 (23 Jul 2024 22:00)  HR: 81 (24 Jul 2024 05:43) (71 - 81)  BP: 128/72 (24 Jul 2024 05:43) (113/63 - 128/72)  BP(mean): --  RR: 18 (24 Jul 2024 05:43) (17 - 18)  SpO2: 97% (24 Jul 2024 05:43) (95% - 99%)    Parameters below as of 24 Jul 2024 05:43  Patient On (Oxygen Delivery Method): room air    Physical Exam:  General: Pt Alert and oriented, NAD  - Left Hip Dressing CDI   ACE wrap  Pulses: +2 DP pulses, wpw toes   Sensation: silt intact bilateral LE  Motor: EHL/FHL/TA/GS- firing bilateral lower extremities    Plan of Care:  A/P: 79yMale s/p LEFT Femur Pathological Fx Biopsy and Provisional ORIF on 7/16 and LEFT Femur JOLLY and IMN on 7/22 by Dr. Shaw  - Stable  - Pain control; added flexeril for muscle spasms  - DVT ppx: Lovenox 40mg SubQ Q24h   - PT, WBS: WBAT LLE, ROMAT  - F/u Med Onc Recs - Final pathology B-Cell Lymphoma  - F/u hospitalist Recs  - Dispo - Pending PT, Med Onc Recs about inpatient vs. outpatient lymphoma treatment\  - PET Scan today    Ortho Pager [phone]

## 2024-07-24 NOTE — PROGRESS NOTE ADULT - ASSESSMENT
79M w HTN, HLD, Prostate CA s/p XRT w chronic L leg pain for months p/w fall after L leg gave out on him, found to have proximal femoral shaft fracture, s/p biopsy and provisional ORIF Dr. Shaw 7/16, pathology returning as B cell lymphoma - s/p IM Nail w Dr. Shaw 7/22    #B - Cell lymphoma - see below  #Constipation - Resolved    #Post-op state - pain controlled. PPx: held today for OR. On bowel regimen and incentive spirometer   - tylenol 1g q8   - PRN: Oxycodone 5/10 q4 for mod/severe pain  #L Proximal femur fracture   - CT C/A/P wo evidence of metastatic disease. Enlarged prostate wo fiducials   - MR L femur: pathologic fracture w displacement - some soft tissue present suggestive of mass w permeative marrow changes extending to lesser trochanter and mid-distal 3rd of femoral diaphysis.    - bone scan - signal at femoral shaft   - SPEP, UPEP negative for monoclonal band    #HTN - home on atenolol 25mg daily, amlodipine 10mg daily, ramipril 10mg daily  #HLD - home on lipitor 40mg daily  #Leukocytosis - resolved.   #Prostate CA - on flomax 0.4mg HS  #Vitamin D insufficiency - Suboptimal - borderline w 29.9   - increased supplementation to 2000 IU daily D3  #Hyponatremia - 135 today      Plan  Encourage PO/Fluid intake  PET-CT ordered; Can have non-urgent TTE afterward  Continue PT     Co-management will continue to follow  Above d/w orthopedics

## 2024-07-24 NOTE — PROGRESS NOTE ADULT - SUBJECTIVE AND OBJECTIVE BOX
INTERVAL HPI/OVERNIGHT EVENTS: sravanthi o/n    SUBJECTIVE: Patient seen and examined at bedside.   Reports cramping in R thigh is improving. Pain in L buttock also improving. Had BM. No LH/dizziness, chest pain, dyspnea. Eating wo N/V/abd pain. VOiding into urinal.    OBJECTIVE:    VITAL SIGNS:  ICU Vital Signs Last 24 Hrs  T(C): 36.6 (24 Jul 2024 05:43), Max: 37.4 (23 Jul 2024 22:00)  T(F): 97.8 (24 Jul 2024 05:43), Max: 99.3 (23 Jul 2024 22:00)  HR: 81 (24 Jul 2024 05:43) (73 - 81)  BP: 128/72 (24 Jul 2024 05:43) (113/63 - 128/72)  BP(mean): --  ABP: --  ABP(mean): --  RR: 18 (24 Jul 2024 05:43) (17 - 18)  SpO2: 97% (24 Jul 2024 05:43) (95% - 99%)    O2 Parameters below as of 24 Jul 2024 05:43  Patient On (Oxygen Delivery Method): room air              07-24 @ 07:01  -  07-24 @ 16:40  --------------------------------------------------------  IN: 0 mL / OUT: 200 mL / NET: -200 mL      CAPILLARY BLOOD GLUCOSE      POCT Blood Glucose.: 101 mg/dL (24 Jul 2024 15:25)      PHYSICAL EXAM:  GEN: Male in NAD on RA  HEENT: NC/AT, MMM  CV: RRR, nml S1S2, no murmurs  PULM: nml effort, CTAB  ABD: Soft, mild distended, NABS, non-tender  NEURO  A/O x3, moving all extremities, Sensation intact  L leg dressing c/d/i. 5/5 in plantarflex/ext b/l  PSYCH: Appropriate    MEDICATIONS:  MEDICATIONS  (STANDING):  acetaminophen     Tablet .. 1000 milliGRAM(s) Oral every 8 hours  amLODIPine   Tablet 10 milliGRAM(s) Oral daily  atenolol  Tablet 25 milliGRAM(s) Oral daily  atorvastatin 40 milliGRAM(s) Oral at bedtime  bisacodyl Suppository 10 milliGRAM(s) Rectal daily  cholecalciferol 2000 Unit(s) Oral daily  enoxaparin Injectable 40 milliGRAM(s) SubCutaneous every 24 hours  lactated ringers. 1000 milliLiter(s) (70 mL/Hr) IV Continuous <Continuous>  lactulose Syrup 20 Gram(s) Oral daily  polyethylene glycol 3350 17 Gram(s) Oral daily  povidone iodine 5% Nasal Swab 1 Application(s) Both Nostrils once  senna 1 Tablet(s) Oral daily  tamsulosin 0.4 milliGRAM(s) Oral at bedtime    MEDICATIONS  (PRN):  ALPRAZolam 0.25 milliGRAM(s) Oral two times a day PRN anxiety  bisacodyl 5 milliGRAM(s) Oral every 12 hours PRN Constipation  cyclobenzaprine 10 milliGRAM(s) Oral three times a day PRN Muscle Spasm  HYDROmorphone  Injectable 0.5 milliGRAM(s) IV Push every 4 hours PRN breakthrough pain on floor  oxyCODONE    IR 10 milliGRAM(s) Oral every 4 hours PRN Severe Pain (7 - 10)  oxyCODONE    IR 5 milliGRAM(s) Oral every 4 hours PRN Moderate Pain (4 - 6)      ALLERGIES:  Allergies    No Known Allergies    Intolerances        LABS:                        11.5   9.60  )-----------( 342      ( 24 Jul 2024 05:30 )             33.9     07-24    135  |  99  |  17  ----------------------------<  113<H>  4.0   |  28  |  0.95    Ca    8.2<L>      24 Jul 2024 05:30  Phos  3.7     07-23    TPro  5.6<L>  /  Alb  3.1<L>  /  TBili  0.7  /  DBili  <0.2  /  AST  83<H>  /  ALT  56<H>  /  AlkPhos  98  07-23      Urinalysis Basic - ( 24 Jul 2024 05:30 )    Color: x / Appearance: x / SG: x / pH: x  Gluc: 113 mg/dL / Ketone: x  / Bili: x / Urobili: x   Blood: x / Protein: x / Nitrite: x   Leuk Esterase: x / RBC: x / WBC x   Sq Epi: x / Non Sq Epi: x / Bacteria: x        RADIOLOGY & ADDITIONAL TESTS: Reviewed.

## 2024-07-24 NOTE — PROGRESS NOTE ADULT - SUBJECTIVE AND OBJECTIVE BOX
Ortho Note    Pt seen and examined at bedside during AM rounds with wife at bedside. Patient comfortable, had breakfast today. Reports significant left hip pain with physical therapy, does report improvement in spasms with muscle relaxer started yesterday. Having BMs, Voiding freely. Tolerating PO diet.    Denies CP, SOB, N/V, new numbness/tingling      Vital Signs Last 24 Hrs  T(C): 36.6 (24 Jul 2024 05:43), Max: 37.4 (23 Jul 2024 22:00)  T(F): 97.8 (24 Jul 2024 05:43), Max: 99.3 (23 Jul 2024 22:00)  HR: 81 (24 Jul 2024 05:43) (73 - 81)  BP: 128/72 (24 Jul 2024 05:43) (113/63 - 128/72)  BP(mean): --  ABP: --  ABP(mean): --  RR: 18 (24 Jul 2024 05:43) (17 - 18)  SpO2: 97% (24 Jul 2024 05:43) (95% - 99%)    O2 Parameters below as of 24 Jul 2024 05:43  Patient On (Oxygen Delivery Method): room air              General: Pt Alert and oriented, NAD  DSG C/D/I- Left knee ACE wrap, gauze and tegaderm x 3 beneath  Pulses: DP 2+ bilaterally, brisk cap refill  Calves soft, nontender bilaterally  Sensation: SILT distally bilateral lower extremities  Motor:   EHL/FHL/TA/GS: 5/5 bilaterally                          11.5   9.60  )-----------( 342      ( 24 Jul 2024 05:30 )             33.9     07-24    135  |  99  |  17  ----------------------------<  113<H>  4.0   |  28  |  0.95    Ca    8.2<L>      24 Jul 2024 05:30  Phos  3.7     07-23    TPro  5.6<L>  /  Alb  3.1<L>  /  TBili  0.7  /  DBili  <0.2  /  AST  83<H>  /  ALT  56<H>  /  AlkPhos  98  07-23      A/P: 79yMale with pathologic fracture of left femur POD #2 s/p left femur IM nail, awaiting treatment plan for lymphoma  - Stable, labs and vitals reviewed  - Appreciate heme/onc and medicine recommendations  - Plan for PET scan today at 3:30pm, hepatitis labs wnl  - Pain Control  - Continue with bowel regimen  - DVT ppx: SCDs, lovenox  - PT, WBS: WBAT LLE  - Dispo: pending PET scan today    Ortho Pager [phone]

## 2024-07-25 ENCOUNTER — RESULT REVIEW (OUTPATIENT)
Age: 79
End: 2024-07-25

## 2024-07-25 LAB
ANION GAP SERPL CALC-SCNC: 8 MMOL/L — SIGNIFICANT CHANGE UP (ref 5–17)
APTT BLD: 28.3 SEC — SIGNIFICANT CHANGE UP (ref 24.5–35.6)
BUN SERPL-MCNC: 17 MG/DL — SIGNIFICANT CHANGE UP (ref 7–23)
CALCIUM SERPL-MCNC: 8.2 MG/DL — LOW (ref 8.4–10.5)
CHLORIDE SERPL-SCNC: 102 MMOL/L — SIGNIFICANT CHANGE UP (ref 96–108)
CO2 SERPL-SCNC: 28 MMOL/L — SIGNIFICANT CHANGE UP (ref 22–31)
CREAT SERPL-MCNC: 0.86 MG/DL — SIGNIFICANT CHANGE UP (ref 0.5–1.3)
EGFR: 87 ML/MIN/1.73M2 — SIGNIFICANT CHANGE UP
GLUCOSE SERPL-MCNC: 84 MG/DL — SIGNIFICANT CHANGE UP (ref 70–99)
HCT VFR BLD CALC: 32.6 % — LOW (ref 39–50)
HGB BLD-MCNC: 11.1 G/DL — LOW (ref 13–17)
INR BLD: 1.04 — SIGNIFICANT CHANGE UP (ref 0.85–1.18)
LDH SERPL L TO P-CCNC: 442 U/L — HIGH (ref 50–242)
MCHC RBC-ENTMCNC: 31.4 PG — SIGNIFICANT CHANGE UP (ref 27–34)
MCHC RBC-ENTMCNC: 34 GM/DL — SIGNIFICANT CHANGE UP (ref 32–36)
MCV RBC AUTO: 92.4 FL — SIGNIFICANT CHANGE UP (ref 80–100)
NRBC # BLD: 0 /100 WBCS — SIGNIFICANT CHANGE UP (ref 0–0)
PLATELET # BLD AUTO: 353 K/UL — SIGNIFICANT CHANGE UP (ref 150–400)
POTASSIUM SERPL-MCNC: 4.1 MMOL/L — SIGNIFICANT CHANGE UP (ref 3.5–5.3)
POTASSIUM SERPL-SCNC: 4.1 MMOL/L — SIGNIFICANT CHANGE UP (ref 3.5–5.3)
PROTHROM AB SERPL-ACNC: 11.8 SEC — SIGNIFICANT CHANGE UP (ref 9.5–13)
RBC # BLD: 3.53 M/UL — LOW (ref 4.2–5.8)
RBC # FLD: 14 % — SIGNIFICANT CHANGE UP (ref 10.3–14.5)
SODIUM SERPL-SCNC: 138 MMOL/L — SIGNIFICANT CHANGE UP (ref 135–145)
WBC # BLD: 8.26 K/UL — SIGNIFICANT CHANGE UP (ref 3.8–10.5)
WBC # FLD AUTO: 8.26 K/UL — SIGNIFICANT CHANGE UP (ref 3.8–10.5)

## 2024-07-25 PROCEDURE — 99232 SBSQ HOSP IP/OBS MODERATE 35: CPT

## 2024-07-25 PROCEDURE — 99233 SBSQ HOSP IP/OBS HIGH 50: CPT

## 2024-07-25 PROCEDURE — 93306 TTE W/DOPPLER COMPLETE: CPT | Mod: 26

## 2024-07-25 RX ADMIN — SENNOSIDES 1 TABLET(S): 8.6 TABLET ORAL at 12:52

## 2024-07-25 RX ADMIN — Medication 2000 UNIT(S): at 12:52

## 2024-07-25 RX ADMIN — ENOXAPARIN SODIUM 40 MILLIGRAM(S): 120 INJECTION SUBCUTANEOUS at 05:54

## 2024-07-25 RX ADMIN — Medication 1000 MILLIGRAM(S): at 05:10

## 2024-07-25 RX ADMIN — OXYCODONE HYDROCHLORIDE 5 MILLIGRAM(S): 30 TABLET ORAL at 20:20

## 2024-07-25 RX ADMIN — OXYCODONE HYDROCHLORIDE 5 MILLIGRAM(S): 30 TABLET ORAL at 06:10

## 2024-07-25 RX ADMIN — OXYCODONE HYDROCHLORIDE 5 MILLIGRAM(S): 30 TABLET ORAL at 05:10

## 2024-07-25 RX ADMIN — OXYCODONE HYDROCHLORIDE 5 MILLIGRAM(S): 30 TABLET ORAL at 19:20

## 2024-07-25 RX ADMIN — Medication 17 GRAM(S): at 12:53

## 2024-07-25 RX ADMIN — ATENOLOL 25 MILLIGRAM(S): 100 TABLET ORAL at 05:54

## 2024-07-25 RX ADMIN — OXYCODONE HYDROCHLORIDE 5 MILLIGRAM(S): 30 TABLET ORAL at 13:52

## 2024-07-25 RX ADMIN — Medication 1000 MILLIGRAM(S): at 21:56

## 2024-07-25 RX ADMIN — Medication 10 MILLIGRAM(S): at 21:56

## 2024-07-25 RX ADMIN — ATORVASTATIN CALCIUM 40 MILLIGRAM(S): 40 TABLET, FILM COATED ORAL at 21:56

## 2024-07-25 RX ADMIN — Medication 0.4 MILLIGRAM(S): at 21:56

## 2024-07-25 RX ADMIN — Medication 1000 MILLIGRAM(S): at 16:28

## 2024-07-25 RX ADMIN — OXYCODONE HYDROCHLORIDE 5 MILLIGRAM(S): 30 TABLET ORAL at 12:52

## 2024-07-25 RX ADMIN — AMLODIPINE BESYLATE 10 MILLIGRAM(S): 2.5 TABLET ORAL at 05:54

## 2024-07-25 NOTE — PROGRESS NOTE ADULT - SUBJECTIVE AND OBJECTIVE BOX
Hematology Oncology Progress Note      SUBJECTIVE: Patient seen and examined at bedside. Left leg tenderness, wrapped in gauze, reports improving flexibility and mobility with PT.    OBJECTIVE:    VITAL SIGNS:  ICU Vital Signs Last 24 Hrs  T(C): 37.3 (25 Jul 2024 16:14), Max: 37.3 (25 Jul 2024 16:14)  T(F): 99.2 (25 Jul 2024 16:14), Max: 99.2 (25 Jul 2024 16:14)  HR: 74 (25 Jul 2024 16:14) (63 - 80)  BP: 114/70 (25 Jul 2024 16:14) (111/69 - 132/65)  BP(mean): --  ABP: --  ABP(mean): --  RR: 16 (25 Jul 2024 16:14) (16 - 18)  SpO2: 95% (25 Jul 2024 16:14) (95% - 98%)    O2 Parameters below as of 25 Jul 2024 16:14  Patient On (Oxygen Delivery Method): room air              07-24 @ 07:01  -  07-25 @ 07:00  --------------------------------------------------------  IN: 0 mL / OUT: 200 mL / NET: -200 mL    07-25 @ 07:01  -  07-25 @ 17:29  --------------------------------------------------------  IN: 0 mL / OUT: 300 mL / NET: -300 mL      CAPILLARY BLOOD GLUCOSE      POCT Blood Glucose.: 101 mg/dL (24 Jul 2024 15:25)      PHYSICAL EXAM:  General: NAD, answering questions, pleasantly conversant  HEENT: NC/AT; PERRL, clear conjunctiva  Neck: supple  Respiratory: CTA b/l  Cardiovascular: +S1/S2; RRR  Abdomen: soft, NT/ND; +BS x4  Extremities: WWP, 2+ peripheral pulses b/l; left leg wrapped in gauze  Skin: normal color and turgor; no rash  Neurological: AAOx3    MEDICATIONS:  MEDICATIONS  (STANDING):  acetaminophen     Tablet .. 1000 milliGRAM(s) Oral every 8 hours  amLODIPine   Tablet 10 milliGRAM(s) Oral daily  atenolol  Tablet 25 milliGRAM(s) Oral daily  atorvastatin 40 milliGRAM(s) Oral at bedtime  bisacodyl Suppository 10 milliGRAM(s) Rectal daily  cholecalciferol 2000 Unit(s) Oral daily  enoxaparin Injectable 40 milliGRAM(s) SubCutaneous every 24 hours  lactated ringers. 1000 milliLiter(s) (70 mL/Hr) IV Continuous <Continuous>  lactulose Syrup 20 Gram(s) Oral daily  polyethylene glycol 3350 17 Gram(s) Oral daily  povidone iodine 5% Nasal Swab 1 Application(s) Both Nostrils once  senna 1 Tablet(s) Oral daily  tamsulosin 0.4 milliGRAM(s) Oral at bedtime    MEDICATIONS  (PRN):  ALPRAZolam 0.25 milliGRAM(s) Oral two times a day PRN anxiety  bisacodyl 5 milliGRAM(s) Oral every 12 hours PRN Constipation  cyclobenzaprine 10 milliGRAM(s) Oral three times a day PRN Muscle Spasm  HYDROmorphone  Injectable 0.5 milliGRAM(s) IV Push every 4 hours PRN breakthrough pain on floor  oxyCODONE    IR 10 milliGRAM(s) Oral every 4 hours PRN Severe Pain (7 - 10)  oxyCODONE    IR 5 milliGRAM(s) Oral every 4 hours PRN Moderate Pain (4 - 6)      ALLERGIES:  Allergies    No Known Allergies    Intolerances        LABS:                        11.1   8.26  )-----------( 353      ( 25 Jul 2024 05:30 )             32.6     07-25    138  |  102  |  17  ----------------------------<  84  4.1   |  28  |  0.86    Ca    8.2<L>      25 Jul 2024 05:30        Urinalysis Basic - ( 25 Jul 2024 05:30 )    Color: x / Appearance: x / SG: x / pH: x  Gluc: 84 mg/dL / Ketone: x  / Bili: x / Urobili: x   Blood: x / Protein: x / Nitrite: x   Leuk Esterase: x / RBC: x / WBC x   Sq Epi: x / Non Sq Epi: x / Bacteria: x            RADIOLOGY, PATHOLOGY, & ADDITIONAL TESTS: Reviewed.

## 2024-07-25 NOTE — PROGRESS NOTE ADULT - SUBJECTIVE AND OBJECTIVE BOX
INTERVAL HPI/OVERNIGHT EVENTS: sravanthi o/n    SUBJECTIVE: Patient seen and examined at bedside.   Feels well. Was able to walk using a walker w PT - minimal assist yesterday. Pain is controlled - no LH/dizziness. Eating more - no N/V/abd pain. passing BM. No chest pain, dyspnea.     OBJECTIVE:    VITAL SIGNS:  ICU Vital Signs Last 24 Hrs  T(C): 36.8 (25 Jul 2024 09:21), Max: 37.2 (24 Jul 2024 20:51)  T(F): 98.2 (25 Jul 2024 09:21), Max: 98.9 (24 Jul 2024 20:51)  HR: 80 (25 Jul 2024 12:00) (63 - 80)  BP: 129/68 (25 Jul 2024 12:00) (111/69 - 132/65)  BP(mean): --  ABP: --  ABP(mean): --  RR: 17 (25 Jul 2024 09:21) (17 - 18)  SpO2: 97% (25 Jul 2024 12:00) (96% - 98%)    O2 Parameters below as of 25 Jul 2024 12:00  Patient On (Oxygen Delivery Method): room air              07-24 @ 07:01  -  07-25 @ 07:00  --------------------------------------------------------  IN: 0 mL / OUT: 200 mL / NET: -200 mL    07-25 @ 07:01  -  07-25 @ 16:41  --------------------------------------------------------  IN: 0 mL / OUT: 300 mL / NET: -300 mL      CAPILLARY BLOOD GLUCOSE      POCT Blood Glucose.: 101 mg/dL (24 Jul 2024 15:25)      PHYSICAL EXAM:  GEN: Male in NAD on RA  HEENT: NC/AT, MMM  CV: RRR, nml S1S2, no murmurs  PULM: nml effort, CTAB  ABD: Soft, mild distended, NABS, non-tender  NEURO  A/O x3, moving all extremities, Sensation intact  L leg dressing c/d/i. 5/5 in plantarflex/ext b/l  PSYCH: Appropriate    MEDICATIONS:  MEDICATIONS  (STANDING):  acetaminophen     Tablet .. 1000 milliGRAM(s) Oral every 8 hours  amLODIPine   Tablet 10 milliGRAM(s) Oral daily  atenolol  Tablet 25 milliGRAM(s) Oral daily  atorvastatin 40 milliGRAM(s) Oral at bedtime  bisacodyl Suppository 10 milliGRAM(s) Rectal daily  cholecalciferol 2000 Unit(s) Oral daily  enoxaparin Injectable 40 milliGRAM(s) SubCutaneous every 24 hours  lactated ringers. 1000 milliLiter(s) (70 mL/Hr) IV Continuous <Continuous>  lactulose Syrup 20 Gram(s) Oral daily  polyethylene glycol 3350 17 Gram(s) Oral daily  povidone iodine 5% Nasal Swab 1 Application(s) Both Nostrils once  senna 1 Tablet(s) Oral daily  tamsulosin 0.4 milliGRAM(s) Oral at bedtime    MEDICATIONS  (PRN):  ALPRAZolam 0.25 milliGRAM(s) Oral two times a day PRN anxiety  bisacodyl 5 milliGRAM(s) Oral every 12 hours PRN Constipation  cyclobenzaprine 10 milliGRAM(s) Oral three times a day PRN Muscle Spasm  HYDROmorphone  Injectable 0.5 milliGRAM(s) IV Push every 4 hours PRN breakthrough pain on floor  oxyCODONE    IR 5 milliGRAM(s) Oral every 4 hours PRN Moderate Pain (4 - 6)  oxyCODONE    IR 10 milliGRAM(s) Oral every 4 hours PRN Severe Pain (7 - 10)      ALLERGIES:  Allergies    No Known Allergies    Intolerances        LABS:                        11.1   8.26  )-----------( 353      ( 25 Jul 2024 05:30 )             32.6     07-25    138  |  102  |  17  ----------------------------<  84  4.1   |  28  |  0.86    Ca    8.2<L>      25 Jul 2024 05:30        Urinalysis Basic - ( 25 Jul 2024 05:30 )    Color: x / Appearance: x / SG: x / pH: x  Gluc: 84 mg/dL / Ketone: x  / Bili: x / Urobili: x   Blood: x / Protein: x / Nitrite: x   Leuk Esterase: x / RBC: x / WBC x   Sq Epi: x / Non Sq Epi: x / Bacteria: x        RADIOLOGY & ADDITIONAL TESTS: Reviewed.

## 2024-07-25 NOTE — PROGRESS NOTE ADULT - ATTENDING COMMENTS
79M PMH HTN, HLD, prostate CA (definitive course SBRT & Cyberknife, completed 6/13/18), who presents with 4 months of progressive left leg pain followed by mechanical fall, found to have evidence of pathologic L femur fracture and now s/p provisional ORIF with biopsy on 7/16.     Hematology/oncology consulted for new pathology findings of B-cell lymphoma.     B-cell lymphoma  On pathology appears to be severely necrotic tissue. Discussed with  from pathology. [facility] belives this is suspicious for a high grade lymphoma like DLBCL however a definitive diagnosis cannot be made.  At this time this diagnosis is not particularly useful as lymphomas are treated differently based on subclassification.  Some B cell lymphomas do not require therapy as they are indolent in nature.    PET scan done and reviewed with .  There is no evidence of malignancy elsewhere, so no other site to biopsy.    Plan:  --Bone marrow biopsy - to aid in staging and final diagnosis  --will present case at [facility] Heme [facility] on Tuesday  --patient/wife/daughter requesting second opinion, they were provided with instruction how to obtain a second opinion at an outside institution and to contact [facility] medical records  --trend daily CBC, CMP, LDH, uric acid  --Hepatitis panel and HIV obtained  --Echo done    There is no oncological c/i to dc given no clear evidence of disease on PET, no final diagnosis, and no immediate need for chemotherapy. Defer dispo planning to primary team.  Arrange for follow up with oncology outpatient at patient preference, but ensure f/u is scheduled for within 1-2 wks.    Extensively discussed with the patient, wife and daughter. 79M PMH HTN, HLD, prostate CA (definitive course SBRT & Cyberknife, completed 6/13/18), who presents with 4 months of progressive left leg pain followed by mechanical fall, found to have evidence of pathologic L femur fracture and now s/p provisional ORIF with biopsy on 7/16.     Hematology/oncology consulted for new pathology findings of B-cell lymphoma.     B-cell lymphoma  On pathology appears to be severely necrotic tissue. Discussed with  from pathology. [facility] belives this is suspicious for a high grade lymphoma like DLBCL however a definitive diagnosis cannot be made.  At this time this diagnosis is not particularly useful as lymphomas are treated differently based on subclassification.  Some B cell lymphomas do not require therapy as they are indolent in nature.    PET scan done and reviewed directly with .  There is no evidence of malignancy elsewhere, so no other site to biopsy.    Plan:  --Bone marrow biopsy - to aid in staging and final diagnosis  --will present case at [facility] Heme [facility] on Tuesday  --patient/wife/daughter requesting second opinion, they were provided with instruction how to obtain a second opinion at an outside institution and to contact [facility] medical records  --trend daily CBC, CMP, LDH, uric acid  --Hepatitis panel and HIV obtained  --Echo done    There is no oncological c/i to dc given no clear evidence of disease on PET, no final diagnosis, and no immediate need for chemotherapy. Defer dispo planning to primary team.  Arrange for follow up with oncology outpatient at patient preference, but ensure f/u is scheduled for within 1-2 wks.    Extensively discussed with the patient, wife and daughter. 79M PMH HTN, HLD, prostate CA (definitive course SBRT & Cyberknife, completed 6/13/18), who presents with 4 months of progressive left leg pain followed by mechanical fall, found to have evidence of pathologic L femur fracture and now s/p provisional ORIF with biopsy on 7/16.     Hematology/oncology consulted for new pathology findings of B-cell lymphoma.     B-cell lymphoma  On pathology appears to be severely necrotic tissue. Discussed with  from pathology. [facility] belives this is suspicious for a high grade lymphoma like DLBCL however a definitive diagnosis cannot be made.  At this time this diagnosis is not particularly useful as lymphomas are treated differently based on subclassification.  Some B cell lymphomas do not require therapy as they are indolent in nature.    PET scan done and reviewed directly with .  There is no evidence of malignancy elsewhere, so no other site to biopsy.    Plan:  --Bone marrow biopsy - to aid in staging and final diagnosis  --will present case at [facility] Heme TB on Tuesday  --patient/wife/daughter requesting second opinion, they were provided with instruction how to obtain a second opinion at an outside institution and to contact [facility] medical records  --trend daily CBC, CMP, LDH, uric acid  --Hepatitis panel and HIV obtained  --Echo done    There is no oncological c/i to dc given no clear evidence of disease on PET, no final diagnosis, and no immediate need for chemotherapy to aid in tumor debulking (in the next 7 days). If this diagnosis gets finalized as DLBCL treatment would be RCHOP and RT to the leg postoperatively.   Defer dispo planning to primary team.  Arrange for follow up with oncology outpatient at patient preference, but ensure f/u is scheduled for within 1-2 wks to start therapy.    Extensively discussed with the patient, wife and daughter.

## 2024-07-25 NOTE — PROGRESS NOTE ADULT - SUBJECTIVE AND OBJECTIVE BOX
Ortho Note    Subjective:  Pt comfortable without complaints. Ambulated yesterday and got PET Scan.  Denies CP, SOB, N/V, numbness/tingling     Vital Signs Last 24 Hrs  T(C): 36.9 (25 Jul 2024 05:17), Max: 37.2 (24 Jul 2024 20:51)  T(F): 98.4 (25 Jul 2024 05:17), Max: 98.9 (24 Jul 2024 20:51)  HR: 63 (25 Jul 2024 05:17) (63 - 78)  BP: 132/65 (25 Jul 2024 05:17) (116/74 - 132/65)  BP(mean): --  RR: 17 (25 Jul 2024 05:17) (17 - 18)  SpO2: 98% (25 Jul 2024 05:17) (96% - 98%)    Parameters below as of 25 Jul 2024 05:17  Patient On (Oxygen Delivery Method): room air      Physical Exam:  General: Pt Alert and oriented, NAD  - Left Hip Dressing CDI   ACE wrap  Pulses: +2 DP pulses, wpw toes   Sensation: silt intact bilateral LE  Motor: EHL/FHL/TA/GS- firing bilateral lower extremities    Plan of Care:  A/P: 79yMale s/p LEFT Femur Pathological Fx Biopsy and Provisional ORIF on 7/16 and LEFT Femur JOLLY and IMN on 7/22 by Dr. Shaw  - Stable  - Pain control; added flexeril for muscle spasms  - DVT ppx: Lovenox 40mg SubQ Q24h   - PT, WBS: WBAT LLE, ROMAT  - F/u Med Onc Recs - Final pathology B-Cell Lymphoma  - F/u hospitalist Recs  - Dispo - Pending PT, Med Onc Recs about inpatient vs. outpatient lymphoma treatment\  - F/u PET Scan Results    Ortho Pager [phone]

## 2024-07-25 NOTE — PROGRESS NOTE ADULT - SUBJECTIVE AND OBJECTIVE BOX
POST OPERATIVE DAY #3 left femur IM nail   SUBJECTIVE: Patient seen and examined.  Pt without complaints. Feeling well with no pain at rest, anxious to work with PT. Visiting with wife at bedside.   Denies chest pain/SOB/dizziness/n/v/HA   Pain well controlled.       OBJECTIVE:     Vital Signs Last 24 Hrs  T(C): 36.8 (25 Jul 2024 09:21), Max: 37.2 (24 Jul 2024 20:51)  T(F): 98.2 (25 Jul 2024 09:21), Max: 98.9 (24 Jul 2024 20:51)  HR: 69 (25 Jul 2024 09:21) (63 - 78)  BP: 111/69 (25 Jul 2024 09:21) (111/69 - 132/65)  BP(mean): --  RR: 17 (25 Jul 2024 09:21) (17 - 18)  SpO2: 97% (25 Jul 2024 09:21) (96% - 98%)  Parameters below as of 25 Jul 2024 09:21  Patient On (Oxygen Delivery Method): room air    PE: Comfortable, NAD, alert, oriented. Pleasant.          Dressing: clean/dry/intact - gauze/tegaderm         Sensation: intact to light touch to patient's baseline BLE          Motor exam:  firing ehl/ta/gs/fhl BLE          Skin warm, well-perfused; capillary refill brisk             LABS:                        11.1   8.26  )-----------( 353      ( 25 Jul 2024 05:30 )             32.6     07-25    138  |  102  |  17  ----------------------------<  84  4.1   |  28  |  0.86    Ca    8.2<L>      25 Jul 2024 05:30    Urinalysis Basic - ( 25 Jul 2024 05:30 )  Color: x / Appearance: x / SG: x / pH: x  Gluc: 84 mg/dL / Ketone: x  / Bili: x / Urobili: x   Blood: x / Protein: x / Nitrite: x   Leuk Esterase: x / RBC: x / WBC x   Sq Epi: x / Non Sq Epi: x / Bacteria: x    ASSESSMENT AND PLAN: 79M POD 3 left femur IM nail, now s/p PET scan yesterday for evaluation of newly diagnosed B cell lymphoma   1. Stable, Appreciate Heme/Onc and Medicine recommendations. Awaiting PET scan results/recommendations from Heme Onc.   2. Analgesic pain control  3. DVT prophylaxis: SCDs, Lovenox   4. Weight Bearing Status:  WBAT LLE   5. Disposition: PT recs TBD based on improvement today; Also pending development of lymphoma treatment plan

## 2024-07-25 NOTE — PROGRESS NOTE ADULT - ASSESSMENT
79M w HTN, HLD, Prostate CA s/p XRT w chronic L leg pain for months p/w fall after L leg gave out on him, found to have proximal femoral shaft fracture, s/p biopsy and provisional ORIF Dr. Shaw 7/16, pathology returning as B cell lymphoma - s/p IM Nail w Dr. Shaw 7/22    #B - Cell lymphoma - see below  #Constipation - Resolved    #Post-op state - pain controlled. PPx: held today for OR. On bowel regimen and incentive spirometer   - tylenol 1g q8   - PRN: Oxycodone 5/10 q4 for mod/severe pain  #L Proximal femur fracture   - CT C/A/P wo evidence of metastatic disease. Enlarged prostate wo fiducials   - MR L femur: pathologic fracture w displacement - some soft tissue present suggestive of mass w permeative marrow changes extending to lesser trochanter and mid-distal 3rd of femoral diaphysis.    - bone scan - signal at femoral shaft   - SPEP, UPEP negative for monoclonal band    #HTN - home on atenolol 25mg daily, amlodipine 10mg daily, ramipril 10mg daily  #HLD - home on lipitor 40mg daily  #Leukocytosis - resolved.   #Prostate CA - on flomax 0.4mg HS  #Vitamin D insufficiency - Suboptimal - borderline w 29.9   - increased supplementation to 2000 IU daily D3  #Hyponatremia - resolved    Plan  Encourage PO/Fluid intake  f/u PET-CT read  f/u TTE   Appreciate Heme-Onc recs. Discussed w patient whether current recommendations would still be for disposition at rehab vs home w HPT.    Continue PT     Co-management will continue to follow  Above d/w orthopedics

## 2024-07-25 NOTE — PROGRESS NOTE ADULT - ASSESSMENT
79 M PMH HTN, HLD, prostate CA (definitive course SBRT & Cyberknife, completed 6/13/18), who presents with 4 months of progressive left leg pain followed by mechanical fall, found to have evidence of pathologic L femur fracture and now s/p provisional ORIF with biopsy on 7/16. Hematology/oncology consulted for new pathology findings of B-cell lymphoma.     # B-cell lymphoma  # pathologic L femur fracture  Surgical path from L femur fx (7/16/24) showing predominantly necrotic tissue with lymphoproliferative proliferation compatible with B-cell lymphoma. Additional immunohistochemical stains show the neoplastic cells to be positive for Bcl-2, while CD10, BCL6, c-Myc, AE1/AE3, CD23 and cyclin D1 are compromised in this necrotic tissue. 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  Elevated LDH with highly necrotic tissue is concerning for higher grade tumor.  Hepatitis C Ab & Hepatitis B core Ab, surface Ab, surface antigen negative    Plan:  - Reviewed available findings with patient at bedside, spouse, and daughter. Overall appears to be consistent with B-Cell lymphoma, but due to tissue sample unable to definitively confirm whether this represents an indolent or high grade process; high LDH and pathologic fracture may suggest the latter, though preliminary read of PET/CT does not show bulky disease, lymphadenopathy, diffuse marrow involvement, or other easily accessible site to biopsy (final read by nuclear medicine radiology attending is pending).  - Discussed role for bone marrow biopsy given need for additional tissue and concern for a higher grade process. Suggest IR bone marrow biopsy (for patient comfort given inability to flex knee for lateral recumbent position), if IR unable to accommodate, will attempt bedside bone marrow biopsy tomorrow. NPO at midnight and coagulation studies in AM or per IR.  - Will follow up final PET/CT read  - Will plan to discuss at [facility] on Tuesday 7/30/24  - When medically ready for discharge, patient should follow up with Dr. Cricket Polk at [facility] within 7-10 days to review pathology and discuss next steps.  - All questions answered.    Discussed with hematology oncology attending Dr. Cricket Polk. Recommendations are considered final after attending attestation.

## 2024-07-26 ENCOUNTER — RESULT REVIEW (OUTPATIENT)
Age: 79
End: 2024-07-26

## 2024-07-26 LAB — LDH SERPL L TO P-CCNC: 383 U/L — HIGH (ref 50–242)

## 2024-07-26 PROCEDURE — 38222 DX BONE MARROW BX & ASPIR: CPT | Mod: LT

## 2024-07-26 PROCEDURE — 88311 DECALCIFY TISSUE: CPT | Mod: 26

## 2024-07-26 PROCEDURE — 77002 NEEDLE LOCALIZATION BY XRAY: CPT | Mod: 26

## 2024-07-26 PROCEDURE — 88313 SPECIAL STAINS GROUP 2: CPT | Mod: 26

## 2024-07-26 PROCEDURE — 88305 TISSUE EXAM BY PATHOLOGIST: CPT | Mod: 26

## 2024-07-26 PROCEDURE — 99152 MOD SED SAME PHYS/QHP 5/>YRS: CPT

## 2024-07-26 PROCEDURE — 85097 BONE MARROW INTERPRETATION: CPT

## 2024-07-26 PROCEDURE — 88341 IMHCHEM/IMCYTCHM EA ADD ANTB: CPT | Mod: 26

## 2024-07-26 PROCEDURE — 99233 SBSQ HOSP IP/OBS HIGH 50: CPT

## 2024-07-26 PROCEDURE — 88342 IMHCHEM/IMCYTCHM 1ST ANTB: CPT | Mod: 26

## 2024-07-26 RX ORDER — OXYCODONE HYDROCHLORIDE 30 MG/1
5 TABLET ORAL EVERY 4 HOURS
Refills: 0 | Status: DISCONTINUED | OUTPATIENT
Start: 2024-07-26 | End: 2024-07-29

## 2024-07-26 RX ORDER — OXYCODONE HYDROCHLORIDE 30 MG/1
10 TABLET ORAL EVERY 4 HOURS
Refills: 0 | Status: DISCONTINUED | OUTPATIENT
Start: 2024-07-26 | End: 2024-07-29

## 2024-07-26 RX ADMIN — Medication 10 MILLIGRAM(S): at 22:42

## 2024-07-26 RX ADMIN — Medication 1000 MILLIGRAM(S): at 22:42

## 2024-07-26 RX ADMIN — Medication 1000 MILLIGRAM(S): at 16:01

## 2024-07-26 RX ADMIN — Medication 0.4 MILLIGRAM(S): at 22:42

## 2024-07-26 RX ADMIN — ATORVASTATIN CALCIUM 40 MILLIGRAM(S): 40 TABLET, FILM COATED ORAL at 22:42

## 2024-07-26 RX ADMIN — AMLODIPINE BESYLATE 10 MILLIGRAM(S): 2.5 TABLET ORAL at 05:49

## 2024-07-26 RX ADMIN — ATENOLOL 25 MILLIGRAM(S): 100 TABLET ORAL at 05:49

## 2024-07-26 RX ADMIN — Medication 1000 MILLIGRAM(S): at 05:49

## 2024-07-26 RX ADMIN — Medication 2000 UNIT(S): at 11:32

## 2024-07-26 RX ADMIN — ENOXAPARIN SODIUM 40 MILLIGRAM(S): 120 INJECTION SUBCUTANEOUS at 05:49

## 2024-07-26 RX ADMIN — Medication 1000 MILLIGRAM(S): at 15:52

## 2024-07-26 NOTE — PROGRESS NOTE ADULT - SUBJECTIVE AND OBJECTIVE BOX
Ortho Note    Subjective:  Pt comfortable without complaints. Ambulation distance improved. Pt going to bone marrow biopsy today  Denies CP, SOB, N/V, numbness/tingling     Vital Signs Last 24 Hrs  T(C): 36.5 (26 Jul 2024 05:44), Max: 37.3 (25 Jul 2024 16:14)  T(F): 97.7 (26 Jul 2024 05:44), Max: 99.2 (25 Jul 2024 16:14)  HR: 64 (26 Jul 2024 05:44) (64 - 80)  BP: 125/75 (26 Jul 2024 05:44) (111/69 - 129/68)  BP(mean): --  RR: 17 (26 Jul 2024 05:44) (16 - 17)  SpO2: 99% (26 Jul 2024 05:44) (95% - 99%)    Parameters below as of 26 Jul 2024 05:44  Patient On (Oxygen Delivery Method): room air    Physical Exam:  General: Pt Alert and oriented, NAD  - Left Hip Dressing CDI   ACE wrap  Pulses: +2 DP pulses, wpw toes   Sensation: silt intact bilateral LE  Motor: EHL/FHL/TA/GS- firing bilateral lower extremities    Plan of Care:  A/P: 79yMale s/p LEFT Femur Pathological Fx Biopsy and Provisional ORIF on 7/16 and LEFT Femur JOLLY and IMN on 7/22 by Dr. Shaw  - Stable  - Pain control; added flexeril for muscle spasms  - DVT ppx: Lovenox 40mg SubQ Q24h   - PT, WBS: WBAT LLE, ROMAT  - F/u Med Onc Recs - Final pathology B-Cell Lymphoma  - F/u hospitalist Recs  - Dispo - Pending PT, Med Onc Recs about inpatient vs. outpatient lymphoma treatment  - F/u PET Scan Results  - Bone marrow biopsy today    Ortho Pager [phone]

## 2024-07-26 NOTE — CONSULT NOTE ADULT - ASSESSMENT
Assessment/Plan: 79 M PMH HTN, HLD, prostate CA (definitive course SBRT & Cyberknife, completed 6/13/18), who presents with 4 months of progressive left leg pain followed by mechanical fall, found to have evidence of pathologic L femur fracture and now s/p provisional ORIF with biopsy on 7/16 and LEFT Femur JOLLY and IMN on 7/22. Surgical path from L femur fx (7/16/24) showing predominantly necrotic tissue with lymphoproliferative proliferation c/f with B-cell lymphoma. Hematology/oncology following for new pathology findings of B-cell lymphoma, requesting bone marrow biopsy. Case reviewed with Dr. Rosas, plan for procedure with sedation.     Recommendations: Maintain NPO x 8 hours prior to procedure.     Communicated with: ortho team Irma SNOW

## 2024-07-26 NOTE — PROGRESS NOTE ADULT - SUBJECTIVE AND OBJECTIVE BOX
INTERVAL HPI/OVERNIGHT EVENTS: sravanthi o/n    SUBJECTIVE: Patient seen and examined at bedside.   Seated in chair. Reports no pain in R thigh. States pain in L leg is controlled. No Lh/dizziness, chest pain, dyspnea, fever, chills, sweats. No BM in 2 days. Voiding wo dysuria.     OBJECTIVE:    VITAL SIGNS:  ICU Vital Signs Last 24 Hrs  T(C): 36.6 (26 Jul 2024 09:02), Max: 37.3 (25 Jul 2024 16:14)  T(F): 97.9 (26 Jul 2024 09:02), Max: 99.2 (25 Jul 2024 16:14)  HR: 69 (26 Jul 2024 09:02) (64 - 80)  BP: 113/68 (26 Jul 2024 09:02) (113/68 - 129/68)  BP(mean): --  ABP: --  ABP(mean): --  RR: 18 (26 Jul 2024 09:02) (16 - 18)  SpO2: 95% (26 Jul 2024 09:02) (95% - 99%)    O2 Parameters below as of 26 Jul 2024 09:02  Patient On (Oxygen Delivery Method): room air              07-25 @ 07:01  -  07-26 @ 07:00  --------------------------------------------------------  IN: 0 mL / OUT: 300 mL / NET: -300 mL      CAPILLARY BLOOD GLUCOSE      POCT Blood Glucose.: 101 mg/dL (24 Jul 2024 15:25)      PHYSICAL EXAM:  GEN: Male in NAD on RA  HEENT: NC/AT, MMM  CV: RRR, nml S1S2, no murmurs  PULM: nml effort, CTAB  ABD: Soft, mild distended, NABS, non-tender  NEURO  A/O x3, moving all extremities, Sensation intact  L leg in ACE bandage. 5/5 in plantarflex/ext b/l  PSYCH: Appropriate      MEDICATIONS:  MEDICATIONS  (STANDING):  acetaminophen     Tablet .. 1000 milliGRAM(s) Oral every 8 hours  amLODIPine   Tablet 10 milliGRAM(s) Oral daily  atenolol  Tablet 25 milliGRAM(s) Oral daily  atorvastatin 40 milliGRAM(s) Oral at bedtime  bisacodyl Suppository 10 milliGRAM(s) Rectal daily  cholecalciferol 2000 Unit(s) Oral daily  enoxaparin Injectable 40 milliGRAM(s) SubCutaneous every 24 hours  lactated ringers. 1000 milliLiter(s) (70 mL/Hr) IV Continuous <Continuous>  lactulose Syrup 20 Gram(s) Oral daily  polyethylene glycol 3350 17 Gram(s) Oral daily  povidone iodine 5% Nasal Swab 1 Application(s) Both Nostrils once  senna 1 Tablet(s) Oral daily  tamsulosin 0.4 milliGRAM(s) Oral at bedtime    MEDICATIONS  (PRN):  ALPRAZolam 0.25 milliGRAM(s) Oral two times a day PRN anxiety  bisacodyl 5 milliGRAM(s) Oral every 12 hours PRN Constipation  cyclobenzaprine 10 milliGRAM(s) Oral three times a day PRN Muscle Spasm  HYDROmorphone  Injectable 0.5 milliGRAM(s) IV Push every 4 hours PRN breakthrough pain on floor  oxyCODONE    IR 5 milliGRAM(s) Oral every 4 hours PRN Moderate Pain (4 - 6)  oxyCODONE    IR 10 milliGRAM(s) Oral every 4 hours PRN Severe Pain (7 - 10)      ALLERGIES:  Allergies    No Known Allergies    Intolerances        LABS:                        11.1   8.26  )-----------( 353      ( 25 Jul 2024 05:30 )             32.6     07-25    138  |  102  |  17  ----------------------------<  84  4.1   |  28  |  0.86    Ca    8.2<L>      25 Jul 2024 05:30      PT/INR - ( 25 Jul 2024 20:05 )   PT: 11.8 sec;   INR: 1.04          PTT - ( 25 Jul 2024 20:05 )  PTT:28.3 sec  Urinalysis Basic - ( 25 Jul 2024 05:30 )    Color: x / Appearance: x / SG: x / pH: x  Gluc: 84 mg/dL / Ketone: x  / Bili: x / Urobili: x   Blood: x / Protein: x / Nitrite: x   Leuk Esterase: x / RBC: x / WBC x   Sq Epi: x / Non Sq Epi: x / Bacteria: x        RADIOLOGY & ADDITIONAL TESTS: Reviewed.

## 2024-07-26 NOTE — CONSULT NOTE ADULT - SUBJECTIVE AND OBJECTIVE BOX
79 M PMH HTN, HLD, prostate CA (definitive course SBRT & Cyberknife, completed 6/13/18), who presents with 4 months of progressive left leg pain followed by mechanical fall, found to have evidence of pathologic L femur fracture and now s/p provisional ORIF with biopsy on 7/16 and LEFT Femur JOLLY and IMN on 7/22. Surgical path from L femur fx (7/16/24) showing predominantly necrotic tissue with lymphoproliferative proliferation c/f with B-cell lymphoma.     Hematology/oncology consulted for new pathology findings of B-cell lymphoma.    79 M PMH HTN, HLD, prostate CA (definitive course SBRT & Cyberknife, completed 6/13/18), who presents with 4 months of progressive left leg pain followed by mechanical fall, found to have evidence of pathologic L femur fracture and now s/p provisional ORIF with biopsy on 7/16 and LEFT Femur JOLLY and IMN on 7/22. Surgical path from L femur fx (7/16/24) showing predominantly necrotic tissue with lymphoproliferative proliferation c/f with B-cell lymphoma. Hematology/oncology following for new pathology findings of B-cell lymphoma, requesting bone marrow biopsy.     Clinical History: CLOSED FRACTURE OF SHAFTOF LEFT FEMUR    Handoff    MEWS Score    Pathological fracture of femur    Pathological fracture of femur    Closed fracture of shaft of left femur    Open biopsy, femur    Intramedullary nailing of femur    FALL    Room Service Assist    [email or web address]        Meds:acetaminophen     Tablet .. 1000 milliGRAM(s) Oral every 8 hours  ALPRAZolam 0.25 milliGRAM(s) Oral two times a day PRN  amLODIPine   Tablet 10 milliGRAM(s) Oral daily  atenolol  Tablet 25 milliGRAM(s) Oral daily  atorvastatin 40 milliGRAM(s) Oral at bedtime  bisacodyl 5 milliGRAM(s) Oral every 12 hours PRN  bisacodyl Suppository 10 milliGRAM(s) Rectal daily  cholecalciferol 2000 Unit(s) Oral daily  cyclobenzaprine 10 milliGRAM(s) Oral three times a day PRN  enoxaparin Injectable 40 milliGRAM(s) SubCutaneous every 24 hours  HYDROmorphone  Injectable 0.5 milliGRAM(s) IV Push every 4 hours PRN  lactated ringers. 1000 milliLiter(s) IV Continuous <Continuous>  lactulose Syrup 20 Gram(s) Oral daily  oxyCODONE    IR 10 milliGRAM(s) Oral every 4 hours PRN  oxyCODONE    IR 5 milliGRAM(s) Oral every 4 hours PRN  polyethylene glycol 3350 17 Gram(s) Oral daily  povidone iodine 5% Nasal Swab 1 Application(s) Both Nostrils once  senna 1 Tablet(s) Oral daily  tamsulosin 0.4 milliGRAM(s) Oral at bedtime      Allergies:No Known Allergies        Labs:                           11.1   8.26  )-----------( 353      ( 25 Jul 2024 05:30 )             32.6     PT/INR - ( 25 Jul 2024 20:05 )   PT: 11.8 sec;   INR: 1.04          PTT - ( 25 Jul 2024 20:05 )  PTT:28.3 sec  07-25    138  |  102  |  17  ----------------------------<  84  4.1   |  28  |  0.86    Ca    8.2<L>      25 Jul 2024 05:30

## 2024-07-26 NOTE — PROGRESS NOTE ADULT - ASSESSMENT
79M w HTN, HLD, Prostate CA s/p XRT w chronic L leg pain for months p/w fall after L leg gave out on him, found to have proximal femoral shaft fracture, s/p biopsy and provisional ORIF Dr. Shaw 7/16, pathology returning as B cell lymphoma - s/p IM Nail w Dr. Shaw 7/22    #B - Cell lymphoma - see below  #Constipation - Resolved    #Post-op state - pain controlled. PPx: held today for OR. On bowel regimen and incentive spirometer   - tylenol 1g q8   - PRN: Oxycodone 5/10 q4 for mod/severe pain  #L Proximal femur fracture   - CT C/A/P wo evidence of metastatic disease. Enlarged prostate wo fiducials   - MR L femur: pathologic fracture w displacement - some soft tissue present suggestive of mass w permeative marrow changes extending to lesser trochanter and mid-distal 3rd of femoral diaphysis.    - bone scan - signal at femoral shaft   - SPEP, UPEP negative for monoclonal band    #HTN - home on atenolol 25mg daily, amlodipine 10mg daily, ramipril 10mg daily  #HLD - home on lipitor 40mg daily  #Leukocytosis - resolved.   #Prostate CA - on flomax 0.4mg HS  #Vitamin D insufficiency - Suboptimal - borderline w 29.9   - increased supplementation to 2000 IU daily D3  #Hyponatremia - resolved    Plan  For BMBx w IR today  f/u PET scan   Appreciate heme-onc recs that pt can follow-up outpatient to initiate therapy. Pt's case to be discussed on tumor board TUE 7/30  Continue PT  Pt preference would be to return home w HPT > NUBIA.       Co-management will continue to follow  Above d/w orthopedics

## 2024-07-27 LAB — LDH SERPL L TO P-CCNC: 397 U/L — HIGH (ref 50–242)

## 2024-07-27 PROCEDURE — 99233 SBSQ HOSP IP/OBS HIGH 50: CPT

## 2024-07-27 RX ADMIN — Medication 0.4 MILLIGRAM(S): at 21:26

## 2024-07-27 RX ADMIN — Medication 2000 UNIT(S): at 11:45

## 2024-07-27 RX ADMIN — Medication 1000 MILLIGRAM(S): at 21:25

## 2024-07-27 RX ADMIN — ATORVASTATIN CALCIUM 40 MILLIGRAM(S): 40 TABLET, FILM COATED ORAL at 21:26

## 2024-07-27 RX ADMIN — Medication 17 GRAM(S): at 11:45

## 2024-07-27 RX ADMIN — OXYCODONE HYDROCHLORIDE 5 MILLIGRAM(S): 30 TABLET ORAL at 00:42

## 2024-07-27 RX ADMIN — Medication 1000 MILLIGRAM(S): at 14:55

## 2024-07-27 RX ADMIN — Medication 10 MILLIGRAM(S): at 21:26

## 2024-07-27 RX ADMIN — Medication 1000 MILLIGRAM(S): at 14:28

## 2024-07-27 RX ADMIN — ENOXAPARIN SODIUM 40 MILLIGRAM(S): 120 INJECTION SUBCUTANEOUS at 06:01

## 2024-07-27 RX ADMIN — Medication 1000 MILLIGRAM(S): at 06:01

## 2024-07-27 RX ADMIN — AMLODIPINE BESYLATE 10 MILLIGRAM(S): 2.5 TABLET ORAL at 06:01

## 2024-07-27 RX ADMIN — SENNOSIDES 1 TABLET(S): 8.6 TABLET ORAL at 11:45

## 2024-07-27 RX ADMIN — Medication 1000 MILLIGRAM(S): at 22:25

## 2024-07-27 RX ADMIN — OXYCODONE HYDROCHLORIDE 5 MILLIGRAM(S): 30 TABLET ORAL at 01:42

## 2024-07-27 NOTE — PROGRESS NOTE ADULT - SUBJECTIVE AND OBJECTIVE BOX
Ortho Note    Pt seen and examined on morning rounds. Pt comfortable without complaints, pain controlled. bone marrow biopsy done yesterday, pending restults  Denies CP, SOB, N/V, numbness/tingling     Vital Signs Last 24 Hrs  T(C): 36.5 (07-27-24 @ 05:24), Max: 36.5 (07-27-24 @ 05:24)  T(F): 97.7 (07-27-24 @ 05:24), Max: 97.7 (07-27-24 @ 05:24)  HR: 59 (07-27-24 @ 05:24) (59 - 59)  BP: 130/71 (07-27-24 @ 05:24) (130/71 - 130/71)  BP(mean): --  RR: 16 (07-27-24 @ 05:24) (16 - 16)  SpO2: 99% (07-27-24 @ 05:24) (99% - 99%)  I&O's Summary      Physical Exam:  General: Pt Alert and oriented, NAD  - Left Hip Dressing CDI   ACE wrap  Pulses: +2 DP pulses, wpw toes   Sensation: silt intact bilateral LE  Motor: EHL/FHL/TA/GS- firing bilateral lower extremities            A/P: 79yMale s/p LEFT Femur Pathological Fx Biopsy and Provisional ORIF on 7/16 and LEFT Femur JOLLY and IMN on 7/22 by Dr. Shaw  - Stable  - Pain control; added flexeril for muscle spasms  - DVT ppx: Lovenox 40mg SubQ Q24h   - PT, WBS: WBAT LLE, ROMAT  - F/u Med Onc Recs - Final pathology B-Cell Lymphoma  - F/u hospitalist Recs  - Dispo - Pending PT, Med Onc Recs about inpatient vs. outpatient lymphoma treatment  - F/u PET Scan Results  - Bone marrow biopsy yesterday    Ayden Bethea, PGY-4  Ortho Pager [phone]

## 2024-07-27 NOTE — PROGRESS NOTE ADULT - ASSESSMENT
79M w HTN, HLD, Prostate CA s/p XRT w chronic L leg pain for months p/w fall after L leg gave out on him, found to have proximal femoral shaft fracture, s/p biopsy and provisional ORIF Dr. Shaw 7/16, pathology returning as B cell lymphoma - s/p IM Nail w Dr. Shaw 7/22    #B - Cell lymphoma - see below  #Constipation - Resolved    #Post-op state - pain controlled. PPx: held today for OR. On bowel regimen and incentive spirometer   - tylenol 1g q8   - PRN: Oxycodone 5/10 q4 for mod/severe pain  #L Proximal femur fracture   - CT C/A/P wo evidence of metastatic disease. Enlarged prostate wo fiducials   - MR L femur: pathologic fracture w displacement - some soft tissue present suggestive of mass w permeative marrow changes extending to lesser trochanter and mid-distal 3rd of femoral diaphysis.    - bone scan - signal at femoral shaft   - SPEP, UPEP negative for monoclonal band    #HTN - atenolol 25mg daily, amlodipine 10mg daily, ramipril 10mg daily  #HLD - lipitor 40mg daily  #Leukocytosis - resolved.   #Prostate CA - flomax 0.4mg HS  #Vitamin D insufficiency - Suboptimal - borderline w 29.9   - 2000 IU daily D3  #Hyponatremia - resolved    50 minutes spent on this encounter, including face to face with patient, care coordination and documentation.  Plan discussed with orthopedic team.

## 2024-07-27 NOTE — PROGRESS NOTE ADULT - SUBJECTIVE AND OBJECTIVE BOX
Feeling well today, wants to work with physical therapy. Having bowel movements.  Reports that he has chronic facial asymmetry when asked about it.     Remaining ROS negative     PHYSICAL EXAM:    General: sitting up in chair, no acute distress  HEENT: has chronic R sided mouth asymmetry where R side is higher than the left, MMM  Cardiovascular: +S1/S2, RRR  Respiratory: CTA B/L; no W/R/R  Gastrointestinal: soft, NT/ND; +BSx4  Extremities: WWP; no edema  Neurological: pleasant and conversant, chronic facial asymmetry as mentioned above  Psychiatric: pleasant mood and affect    VITAL SIGNS:  Vital Signs Last 24 Hrs  T(C): 36.6 (27 Jul 2024 09:37), Max: 36.6 (26 Jul 2024 21:44)  T(F): 97.8 (27 Jul 2024 09:37), Max: 97.8 (26 Jul 2024 21:44)  HR: 86 (27 Jul 2024 09:37) (59 - 86)  BP: 121/73 (27 Jul 2024 09:37) (121/73 - 130/71)  BP(mean): --  RR: 18 (27 Jul 2024 09:37) (16 - 18)  SpO2: 95% (27 Jul 2024 09:37) (95% - 99%)    Parameters below as of 27 Jul 2024 09:37  Patient On (Oxygen Delivery Method): room air    MEDICATIONS:  MEDICATIONS  (STANDING):  acetaminophen     Tablet .. 1000 milliGRAM(s) Oral every 8 hours  amLODIPine   Tablet 10 milliGRAM(s) Oral daily  atenolol  Tablet 25 milliGRAM(s) Oral daily  atorvastatin 40 milliGRAM(s) Oral at bedtime  bisacodyl Suppository 10 milliGRAM(s) Rectal daily  cholecalciferol 2000 Unit(s) Oral daily  enoxaparin Injectable 40 milliGRAM(s) SubCutaneous every 24 hours  lactated ringers. 1000 milliLiter(s) (70 mL/Hr) IV Continuous <Continuous>  lactulose Syrup 20 Gram(s) Oral daily  polyethylene glycol 3350 17 Gram(s) Oral daily  povidone iodine 5% Nasal Swab 1 Application(s) Both Nostrils once  senna 1 Tablet(s) Oral daily  tamsulosin 0.4 milliGRAM(s) Oral at bedtime    MEDICATIONS  (PRN):  ALPRAZolam 0.25 milliGRAM(s) Oral two times a day PRN anxiety  bisacodyl 5 milliGRAM(s) Oral every 12 hours PRN Constipation  cyclobenzaprine 10 milliGRAM(s) Oral three times a day PRN Muscle Spasm  oxyCODONE    IR 10 milliGRAM(s) Oral every 4 hours PRN Severe Pain (7 - 10)  oxyCODONE    IR 5 milliGRAM(s) Oral every 4 hours PRN Moderate Pain (4 - 6)      ALLERGIES:  Allergies    No Known Allergies    Intolerances    LABS:    PT/INR - ( 25 Jul 2024 20:05 )   PT: 11.8 sec;   INR: 1.04          PTT - ( 25 Jul 2024 20:05 )  PTT:28.3 sec    CAPILLARY BLOOD GLUCOSE    RADIOLOGY & ADDITIONAL TESTS: Reviewed.

## 2024-07-28 LAB
ANION GAP SERPL CALC-SCNC: 8 MMOL/L — SIGNIFICANT CHANGE UP (ref 5–17)
BUN SERPL-MCNC: 17 MG/DL — SIGNIFICANT CHANGE UP (ref 7–23)
CALCIUM SERPL-MCNC: 8 MG/DL — LOW (ref 8.4–10.5)
CHLORIDE SERPL-SCNC: 103 MMOL/L — SIGNIFICANT CHANGE UP (ref 96–108)
CO2 SERPL-SCNC: 26 MMOL/L — SIGNIFICANT CHANGE UP (ref 22–31)
CREAT SERPL-MCNC: 0.84 MG/DL — SIGNIFICANT CHANGE UP (ref 0.5–1.3)
EGFR: 88 ML/MIN/1.73M2 — SIGNIFICANT CHANGE UP
GLUCOSE SERPL-MCNC: 100 MG/DL — HIGH (ref 70–99)
HCT VFR BLD CALC: 31.6 % — LOW (ref 39–50)
HGB BLD-MCNC: 11 G/DL — LOW (ref 13–17)
LDH SERPL L TO P-CCNC: 351 U/L — HIGH (ref 50–242)
MCHC RBC-ENTMCNC: 31.4 PG — SIGNIFICANT CHANGE UP (ref 27–34)
MCHC RBC-ENTMCNC: 34.8 GM/DL — SIGNIFICANT CHANGE UP (ref 32–36)
MCV RBC AUTO: 90.3 FL — SIGNIFICANT CHANGE UP (ref 80–100)
NRBC # BLD: 0 /100 WBCS — SIGNIFICANT CHANGE UP (ref 0–0)
PLATELET # BLD AUTO: 420 K/UL — HIGH (ref 150–400)
POTASSIUM SERPL-MCNC: 4 MMOL/L — SIGNIFICANT CHANGE UP (ref 3.5–5.3)
POTASSIUM SERPL-SCNC: 4 MMOL/L — SIGNIFICANT CHANGE UP (ref 3.5–5.3)
RBC # BLD: 3.5 M/UL — LOW (ref 4.2–5.8)
RBC # FLD: 14.5 % — SIGNIFICANT CHANGE UP (ref 10.3–14.5)
SODIUM SERPL-SCNC: 137 MMOL/L — SIGNIFICANT CHANGE UP (ref 135–145)
WBC # BLD: 5.58 K/UL — SIGNIFICANT CHANGE UP (ref 3.8–10.5)
WBC # FLD AUTO: 5.58 K/UL — SIGNIFICANT CHANGE UP (ref 3.8–10.5)

## 2024-07-28 PROCEDURE — 99233 SBSQ HOSP IP/OBS HIGH 50: CPT

## 2024-07-28 RX ADMIN — Medication 1000 MILLIGRAM(S): at 06:37

## 2024-07-28 RX ADMIN — Medication 1000 MILLIGRAM(S): at 15:23

## 2024-07-28 RX ADMIN — ENOXAPARIN SODIUM 40 MILLIGRAM(S): 120 INJECTION SUBCUTANEOUS at 05:38

## 2024-07-28 RX ADMIN — Medication 1000 MILLIGRAM(S): at 21:36

## 2024-07-28 RX ADMIN — Medication 1000 MILLIGRAM(S): at 05:37

## 2024-07-28 RX ADMIN — Medication 1000 MILLIGRAM(S): at 14:21

## 2024-07-28 RX ADMIN — AMLODIPINE BESYLATE 10 MILLIGRAM(S): 2.5 TABLET ORAL at 05:37

## 2024-07-28 RX ADMIN — ATENOLOL 25 MILLIGRAM(S): 100 TABLET ORAL at 05:37

## 2024-07-28 RX ADMIN — Medication 0.4 MILLIGRAM(S): at 21:36

## 2024-07-28 RX ADMIN — Medication 2000 UNIT(S): at 12:09

## 2024-07-28 RX ADMIN — ATORVASTATIN CALCIUM 40 MILLIGRAM(S): 40 TABLET, FILM COATED ORAL at 21:36

## 2024-07-28 RX ADMIN — SENNOSIDES 1 TABLET(S): 8.6 TABLET ORAL at 12:09

## 2024-07-28 RX ADMIN — Medication 1000 MILLIGRAM(S): at 22:36

## 2024-07-28 NOTE — PROGRESS NOTE ADULT - SUBJECTIVE AND OBJECTIVE BOX
Having bowel movements.  Pain is controlled with tylenol.  Looking forward to going home.      Remaining ROS negative     PHYSICAL EXAM:  General: sitting up in chair, no acute distress  HEENT: has chronic slight R sided mouth asymmetry where R side is higher than the left, MMM  Cardiovascular: +S1/S2, RRR  Respiratory: CTA B/L; no W/R/R  Gastrointestinal: soft, NT/ND; +BSx4  Extremities: WWP; no edema  Neurological: pleasant and conversant, chronic facial asymmetry as mentioned above, no acute deficits noted  Psychiatric: pleasant mood and affect    VITAL SIGNS:  Vital Signs Last 24 Hrs  T(C): 36.7 (28 Jul 2024 08:43), Max: 36.7 (27 Jul 2024 16:35)  T(F): 98 (28 Jul 2024 08:43), Max: 98 (27 Jul 2024 16:35)  HR: 98 (28 Jul 2024 08:43) (64 - 98)  BP: 110/61 (28 Jul 2024 08:43) (110/61 - 129/73)  BP(mean): --  RR: 18 (28 Jul 2024 08:43) (16 - 18)  SpO2: 99% (28 Jul 2024 08:43) (95% - 100%)    Parameters below as of 28 Jul 2024 08:43  Patient On (Oxygen Delivery Method): room air      MEDICATIONS:  MEDICATIONS  (STANDING):  acetaminophen     Tablet .. 1000 milliGRAM(s) Oral every 8 hours  amLODIPine   Tablet 10 milliGRAM(s) Oral daily  atenolol  Tablet 25 milliGRAM(s) Oral daily  atorvastatin 40 milliGRAM(s) Oral at bedtime  bisacodyl Suppository 10 milliGRAM(s) Rectal daily  cholecalciferol 2000 Unit(s) Oral daily  enoxaparin Injectable 40 milliGRAM(s) SubCutaneous every 24 hours  lactated ringers. 1000 milliLiter(s) (70 mL/Hr) IV Continuous <Continuous>  lactulose Syrup 20 Gram(s) Oral daily  polyethylene glycol 3350 17 Gram(s) Oral daily  povidone iodine 5% Nasal Swab 1 Application(s) Both Nostrils once  senna 1 Tablet(s) Oral daily  tamsulosin 0.4 milliGRAM(s) Oral at bedtime    MEDICATIONS  (PRN):  bisacodyl 5 milliGRAM(s) Oral every 12 hours PRN Constipation  cyclobenzaprine 10 milliGRAM(s) Oral three times a day PRN Muscle Spasm  oxyCODONE    IR 10 milliGRAM(s) Oral every 4 hours PRN Severe Pain (7 - 10)  oxyCODONE    IR 5 milliGRAM(s) Oral every 4 hours PRN Moderate Pain (4 - 6)      ALLERGIES:  Allergies    No Known Allergies    Intolerances        LABS:                        11.0   5.58  )-----------( 420      ( 28 Jul 2024 05:30 )             31.6     07-28    137  |  103  |  17  ----------------------------<  100<H>  4.0   |  26  |  0.84    Ca    8.0<L>      28 Jul 2024 05:30        Urinalysis Basic - ( 28 Jul 2024 05:30 )    Color: x / Appearance: x / SG: x / pH: x  Gluc: 100 mg/dL / Ketone: x  / Bili: x / Urobili: x   Blood: x / Protein: x / Nitrite: x   Leuk Esterase: x / RBC: x / WBC x   Sq Epi: x / Non Sq Epi: x / Bacteria: x      CAPILLARY BLOOD GLUCOSE          RADIOLOGY & ADDITIONAL TESTS: Reviewed.

## 2024-07-28 NOTE — PROGRESS NOTE ADULT - SUBJECTIVE AND OBJECTIVE BOX
Ortho Note    Pt seen and examined on morning rounds. Pt comfortable without complaints, pain controlled.  Denies CP, SOB, N/V, numbness/tingling     Vital Signs Last 24 Hrs  T(C): 36.6 (07-28-24 @ 05:16), Max: 36.6 (07-28-24 @ 05:16)  T(F): 97.9 (07-28-24 @ 05:16), Max: 97.9 (07-28-24 @ 05:16)  HR: 64 (07-28-24 @ 05:16) (64 - 64)  BP: 129/73 (07-28-24 @ 05:16) (129/73 - 129/73)  BP(mean): --  RR: 16 (07-28-24 @ 05:16) (16 - 16)  SpO2: 100% (07-28-24 @ 05:16) (100% - 100%)  I&O's Summary    27 Jul 2024 07:01  -  28 Jul 2024 07:00  --------------------------------------------------------  IN: 480 mL / OUT: 1150 mL / NET: -670 mL        Physical Exam:  General: Pt Alert and oriented, NAD  - Left Hip Dressing CDI   ACE wrap  Pulses: +2 DP pulses, wpw toes   Sensation: silt intact bilateral LE  Motor: EHL/FHL/TA/GS- firing bilateral lower extremities                          11.0   5.58  )-----------( 420      ( 28 Jul 2024 05:30 )             31.6     07-28    137  |  103  |  17  ----------------------------<  100<H>  4.0   |  26  |  0.84    Ca    8.0<L>      28 Jul 2024 05:30        A/P: 79yMale s/p LEFT Femur Pathological Fx Biopsy and Provisional ORIF on 7/16 and LEFT Femur JOLLY and IMN on 7/22 by Dr. Shaw  - Stable  - Pain control; added flexeril for muscle spasms  - DVT ppx: Lovenox 40mg SubQ Q24h   - PT, WBS: WBAT LLE, ROMAT  - F/u Med Onc Recs - Final pathology B-Cell Lymphoma  - F/u hospitalist Recs  - PET Scan completed  - Bone marrow biopsy yesterday- pending results  - Dispo - Pending PT, Med Onc Recs about inpatient vs. outpatient lymphoma treatment    Ayden Bethea, PGY-4  Ortho Pager [phone]

## 2024-07-29 ENCOUNTER — TRANSCRIPTION ENCOUNTER (OUTPATIENT)
Age: 79
End: 2024-07-29

## 2024-07-29 VITALS
DIASTOLIC BLOOD PRESSURE: 76 MMHG | HEART RATE: 74 BPM | TEMPERATURE: 99 F | RESPIRATION RATE: 18 BRPM | OXYGEN SATURATION: 97 % | SYSTOLIC BLOOD PRESSURE: 130 MMHG

## 2024-07-29 LAB
ANION GAP SERPL CALC-SCNC: 9 MMOL/L — SIGNIFICANT CHANGE UP (ref 5–17)
BUN SERPL-MCNC: 16 MG/DL — SIGNIFICANT CHANGE UP (ref 7–23)
CALCIUM SERPL-MCNC: 8.3 MG/DL — LOW (ref 8.4–10.5)
CHLORIDE SERPL-SCNC: 103 MMOL/L — SIGNIFICANT CHANGE UP (ref 96–108)
CO2 SERPL-SCNC: 24 MMOL/L — SIGNIFICANT CHANGE UP (ref 22–31)
CREAT SERPL-MCNC: 0.76 MG/DL — SIGNIFICANT CHANGE UP (ref 0.5–1.3)
EGFR: 90 ML/MIN/1.73M2 — SIGNIFICANT CHANGE UP
GLUCOSE SERPL-MCNC: 102 MG/DL — HIGH (ref 70–99)
HCT VFR BLD CALC: 32.6 % — LOW (ref 39–50)
HGB BLD-MCNC: 11.3 G/DL — LOW (ref 13–17)
LDH SERPL L TO P-CCNC: 370 U/L — HIGH (ref 50–242)
MCHC RBC-ENTMCNC: 31.7 PG — SIGNIFICANT CHANGE UP (ref 27–34)
MCHC RBC-ENTMCNC: 34.7 GM/DL — SIGNIFICANT CHANGE UP (ref 32–36)
MCV RBC AUTO: 91.6 FL — SIGNIFICANT CHANGE UP (ref 80–100)
NRBC # BLD: 0 /100 WBCS — SIGNIFICANT CHANGE UP (ref 0–0)
PLATELET # BLD AUTO: 432 K/UL — HIGH (ref 150–400)
POTASSIUM SERPL-MCNC: 4.2 MMOL/L — SIGNIFICANT CHANGE UP (ref 3.5–5.3)
POTASSIUM SERPL-SCNC: 4.2 MMOL/L — SIGNIFICANT CHANGE UP (ref 3.5–5.3)
RBC # BLD: 3.56 M/UL — LOW (ref 4.2–5.8)
RBC # FLD: 14.6 % — HIGH (ref 10.3–14.5)
SODIUM SERPL-SCNC: 136 MMOL/L — SIGNIFICANT CHANGE UP (ref 135–145)
WBC # BLD: 6.1 K/UL — SIGNIFICANT CHANGE UP (ref 3.8–10.5)
WBC # FLD AUTO: 6.1 K/UL — SIGNIFICANT CHANGE UP (ref 3.8–10.5)

## 2024-07-29 PROCEDURE — C1769: CPT

## 2024-07-29 PROCEDURE — G0103: CPT

## 2024-07-29 PROCEDURE — 97116 GAIT TRAINING THERAPY: CPT

## 2024-07-29 PROCEDURE — 84550 ASSAY OF BLOOD/URIC ACID: CPT

## 2024-07-29 PROCEDURE — 85730 THROMBOPLASTIN TIME PARTIAL: CPT

## 2024-07-29 PROCEDURE — 97168 OT RE-EVAL EST PLAN CARE: CPT

## 2024-07-29 PROCEDURE — 78815 PET IMAGE W/CT SKULL-THIGH: CPT | Mod: MC

## 2024-07-29 PROCEDURE — 81001 URINALYSIS AUTO W/SCOPE: CPT

## 2024-07-29 PROCEDURE — 71045 X-RAY EXAM CHEST 1 VIEW: CPT

## 2024-07-29 PROCEDURE — 86901 BLOOD TYPING SEROLOGIC RH(D): CPT

## 2024-07-29 PROCEDURE — 77002 NEEDLE LOCALIZATION BY XRAY: CPT

## 2024-07-29 PROCEDURE — 93005 ELECTROCARDIOGRAM TRACING: CPT

## 2024-07-29 PROCEDURE — 86706 HEP B SURFACE ANTIBODY: CPT

## 2024-07-29 PROCEDURE — 86334 IMMUNOFIX E-PHORESIS SERUM: CPT

## 2024-07-29 PROCEDURE — 80048 BASIC METABOLIC PNL TOTAL CA: CPT

## 2024-07-29 PROCEDURE — C1713: CPT

## 2024-07-29 PROCEDURE — 36415 COLL VENOUS BLD VENIPUNCTURE: CPT

## 2024-07-29 PROCEDURE — 97164 PT RE-EVAL EST PLAN CARE: CPT

## 2024-07-29 PROCEDURE — 97535 SELF CARE MNGMENT TRAINING: CPT

## 2024-07-29 PROCEDURE — 84165 PROTEIN E-PHORESIS SERUM: CPT

## 2024-07-29 PROCEDURE — A9503: CPT

## 2024-07-29 PROCEDURE — 86850 RBC ANTIBODY SCREEN: CPT

## 2024-07-29 PROCEDURE — A9585: CPT

## 2024-07-29 PROCEDURE — 85610 PROTHROMBIN TIME: CPT

## 2024-07-29 PROCEDURE — 84166 PROTEIN E-PHORESIS/URINE/CSF: CPT

## 2024-07-29 PROCEDURE — 82550 ASSAY OF CK (CPK): CPT

## 2024-07-29 PROCEDURE — 84480 ASSAY TRIIODOTHYRONINE (T3): CPT

## 2024-07-29 PROCEDURE — 83970 ASSAY OF PARATHORMONE: CPT

## 2024-07-29 PROCEDURE — 85027 COMPLETE CBC AUTOMATED: CPT

## 2024-07-29 PROCEDURE — 99153 MOD SED SAME PHYS/QHP EA: CPT

## 2024-07-29 PROCEDURE — 73701 CT LOWER EXTREMITY W/DYE: CPT | Mod: MC

## 2024-07-29 PROCEDURE — 82248 BILIRUBIN DIRECT: CPT

## 2024-07-29 PROCEDURE — 82570 ASSAY OF URINE CREATININE: CPT

## 2024-07-29 PROCEDURE — 97110 THERAPEUTIC EXERCISES: CPT

## 2024-07-29 PROCEDURE — 84439 ASSAY OF FREE THYROXINE: CPT

## 2024-07-29 PROCEDURE — 85652 RBC SED RATE AUTOMATED: CPT

## 2024-07-29 PROCEDURE — 85025 COMPLETE CBC W/AUTO DIFF WBC: CPT

## 2024-07-29 PROCEDURE — 93306 TTE W/DOPPLER COMPLETE: CPT

## 2024-07-29 PROCEDURE — 82306 VITAMIN D 25 HYDROXY: CPT

## 2024-07-29 PROCEDURE — 99152 MOD SED SAME PHYS/QHP 5/>YRS: CPT

## 2024-07-29 PROCEDURE — 76000 FLUOROSCOPY <1 HR PHYS/QHP: CPT

## 2024-07-29 PROCEDURE — 87070 CULTURE OTHR SPECIMN AEROBIC: CPT

## 2024-07-29 PROCEDURE — A9552: CPT

## 2024-07-29 PROCEDURE — 86803 HEPATITIS C AB TEST: CPT

## 2024-07-29 PROCEDURE — 84300 ASSAY OF URINE SODIUM: CPT

## 2024-07-29 PROCEDURE — 97165 OT EVAL LOW COMPLEX 30 MIN: CPT

## 2024-07-29 PROCEDURE — 78306 BONE IMAGING WHOLE BODY: CPT | Mod: MC

## 2024-07-29 PROCEDURE — 82962 GLUCOSE BLOOD TEST: CPT

## 2024-07-29 PROCEDURE — 73552 X-RAY EXAM OF FEMUR 2/>: CPT

## 2024-07-29 PROCEDURE — 84403 ASSAY OF TOTAL TESTOSTERONE: CPT

## 2024-07-29 PROCEDURE — 99233 SBSQ HOSP IP/OBS HIGH 50: CPT

## 2024-07-29 PROCEDURE — 84155 ASSAY OF PROTEIN SERUM: CPT

## 2024-07-29 PROCEDURE — 97530 THERAPEUTIC ACTIVITIES: CPT

## 2024-07-29 PROCEDURE — 71260 CT THORAX DX C+: CPT | Mod: MC

## 2024-07-29 PROCEDURE — 83935 ASSAY OF URINE OSMOLALITY: CPT

## 2024-07-29 PROCEDURE — C1830: CPT

## 2024-07-29 PROCEDURE — 86900 BLOOD TYPING SEROLOGIC ABO: CPT

## 2024-07-29 PROCEDURE — 87075 CULTR BACTERIA EXCEPT BLOOD: CPT

## 2024-07-29 PROCEDURE — 38222 DX BONE MARROW BX & ASPIR: CPT

## 2024-07-29 PROCEDURE — 74177 CT ABD & PELVIS W/CONTRAST: CPT | Mod: MC

## 2024-07-29 PROCEDURE — 84443 ASSAY THYROID STIM HORMONE: CPT

## 2024-07-29 PROCEDURE — 87340 HEPATITIS B SURFACE AG IA: CPT

## 2024-07-29 PROCEDURE — 84436 ASSAY OF TOTAL THYROXINE: CPT

## 2024-07-29 PROCEDURE — 80053 COMPREHEN METABOLIC PANEL: CPT

## 2024-07-29 PROCEDURE — 82310 ASSAY OF CALCIUM: CPT

## 2024-07-29 PROCEDURE — 73502 X-RAY EXAM HIP UNI 2-3 VIEWS: CPT

## 2024-07-29 PROCEDURE — 87389 HIV-1 AG W/HIV-1&-2 AB AG IA: CPT

## 2024-07-29 PROCEDURE — 83615 LACTATE (LD) (LDH) ENZYME: CPT

## 2024-07-29 PROCEDURE — 84100 ASSAY OF PHOSPHORUS: CPT

## 2024-07-29 PROCEDURE — 86704 HEP B CORE ANTIBODY TOTAL: CPT

## 2024-07-29 PROCEDURE — 99285 EMERGENCY DEPT VISIT HI MDM: CPT

## 2024-07-29 PROCEDURE — 84402 ASSAY OF FREE TESTOSTERONE: CPT

## 2024-07-29 PROCEDURE — 86140 C-REACTIVE PROTEIN: CPT

## 2024-07-29 PROCEDURE — 73720 MRI LWR EXTREMITY W/O&W/DYE: CPT | Mod: MC

## 2024-07-29 RX ORDER — OXYCODONE HYDROCHLORIDE 30 MG/1
1 TABLET ORAL
Qty: 40 | Refills: 0
Start: 2024-07-29 | End: 2024-08-04

## 2024-07-29 RX ORDER — TAMSULOSIN HCL 0.4 MG
1 CAPSULE ORAL
Qty: 30 | Refills: 0
Start: 2024-07-29 | End: 2024-08-27

## 2024-07-29 RX ORDER — ACETAMINOPHEN 500 MG
2 TABLET ORAL
Qty: 0 | Refills: 0 | DISCHARGE
Start: 2024-07-29

## 2024-07-29 RX ORDER — OFLOXACIN 0.3 %
1 DROPS OPHTHALMIC (EYE)
Qty: 1 | Refills: 0
Start: 2024-07-29 | End: 2024-08-04

## 2024-07-29 RX ORDER — ATENOLOL 100 MG/1
1 TABLET ORAL
Qty: 0 | Refills: 0 | DISCHARGE
Start: 2024-07-29

## 2024-07-29 RX ORDER — RIVAROXABAN 15 MG-20MG
1 KIT ORAL
Qty: 30 | Refills: 0
Start: 2024-07-29 | End: 2024-08-27

## 2024-07-29 RX ORDER — AMLODIPINE BESYLATE 2.5 MG/1
1 TABLET ORAL
Qty: 0 | Refills: 0 | DISCHARGE
Start: 2024-07-29

## 2024-07-29 RX ORDER — ATORVASTATIN CALCIUM 40 MG/1
1 TABLET, FILM COATED ORAL
Qty: 0 | Refills: 0 | DISCHARGE
Start: 2024-07-29

## 2024-07-29 RX ORDER — CYCLOBENZAPRINE HCL 10 MG
1 TABLET ORAL
Qty: 15 | Refills: 0
Start: 2024-07-29 | End: 2024-08-02

## 2024-07-29 RX ORDER — LORATADINE 10 MG
17 TABLET,DISINTEGRATING ORAL
Qty: 0 | Refills: 0 | DISCHARGE
Start: 2024-07-29

## 2024-07-29 RX ADMIN — Medication 1000 MILLIGRAM(S): at 15:33

## 2024-07-29 RX ADMIN — ENOXAPARIN SODIUM 40 MILLIGRAM(S): 120 INJECTION SUBCUTANEOUS at 05:27

## 2024-07-29 RX ADMIN — Medication 1000 MILLIGRAM(S): at 05:27

## 2024-07-29 RX ADMIN — Medication 1000 MILLIGRAM(S): at 16:00

## 2024-07-29 RX ADMIN — Medication 1000 MILLIGRAM(S): at 06:27

## 2024-07-29 RX ADMIN — Medication 2000 UNIT(S): at 12:56

## 2024-07-29 RX ADMIN — AMLODIPINE BESYLATE 10 MILLIGRAM(S): 2.5 TABLET ORAL at 05:30

## 2024-07-29 RX ADMIN — ATENOLOL 25 MILLIGRAM(S): 100 TABLET ORAL at 05:30

## 2024-07-29 NOTE — DISCHARGE NOTE PROVIDER - HOSPITAL COURSE
7/12/24 - admitted to [facility] orthopedic service with left femoral shaft fracture after a fall   Left leg placed in vasquez's traction   7/13/24 - bone scan and MRI of left thigh ordered for suspicion of pathologic fracture   7/14/24 - MRI left femur completed and resulted:   < from: MR Femur w/wo IV Cont, Left (07.14.24 @ 14:25) >    IMPRESSION:  1.  Changes are again seen most consistent with pathologic fracture of   the left mid femur with displacement.  2.  There is surrounding heterogeneous soft tissue is some enhancement   concerning for mass with permeative marrow changes extending into the   lesser trochanter and mid to distal 3rd of the femoral diaphysis.  3.  Somewhat bandlike edema of the left iliac wing without significant   enhancement may favor stress reaction as opposed additional lesion.  4.  Clinical and laboratory correlation is suggested. Consider histologic   correlation and/or PET CT for further evaluation as warranted.    --- End of Report --  < end of copied text >    7/15 - NM Bone imaging total (7/15/24):   Increased uptake at the site of known fracture at the proximal left femur. No scintigraphic evidence of osteoblastic metastatic disease.    7/16/24 - OR for open biopsy left femur,  left femur provisional open reduction internal fixation  7/16 - urology consulted for garvey catheter placement   7/22 - hematology/oncology consulted for pathology findings of B-cell lymphoma  ; OR for left femur intramedullary nail   7/25 - PET scan done and reviewed directly with . Per nuclear  medicine review of PET scan, there is no evidence of malignancy elsewhere, so no other site to biopsy  7/26 - bone marrow biopsy with interventional radiology   7/29 - optimized from medical standpoint for discharge and outpatient follow up with hematology/oncology 7/12/24 - admitted to [facility] orthopedic service with left femoral shaft fracture after a fall, left leg placed in vasquez's traction   7/13/24 - bone scan and MRI of left thigh ordered for suspicion of pathologic fracture   7/14/24 - MRI left femur completed and resulted:   < from: MR Femur w/wo IV Cont, Left (07.14.24 @ 14:25) >    IMPRESSION:  1.  Changes are again seen most consistent with pathologic fracture of   the left mid femur with displacement.  2.  There is surrounding heterogeneous soft tissue is some enhancement   concerning for mass with permeative marrow changes extending into the   lesser trochanter and mid to distal 3rd of the femoral diaphysis.  3.  Somewhat bandlike edema of the left iliac wing without significant   enhancement may favor stress reaction as opposed additional lesion.  4.  Clinical and laboratory correlation is suggested. Consider histologic   correlation and/or PET CT for further evaluation as warranted.    --- End of Report --  < end of copied text >    7/15 - NM Bone imaging total (7/15/24):   Increased uptake at the site of known fracture at the proximal left femur. No scintigraphic evidence of osteoblastic metastatic disease.    7/16/24 - OR for open biopsy left femur,  left femur provisional open reduction internal fixation  7/16 - urology consulted for garvey catheter placement   7/22 - hematology/oncology consulted for pathology findings of B-cell lymphoma; OR for left femur intramedullary nail   7/25 - PET scan done and reviewed directly with . Per nuclear medicine review of PET scan, there is no evidence of malignancy elsewhere, no need for further site biopsy  7/26 - bone marrow biopsy with interventional radiology   7/29 - optimized from medical standpoint for discharge and outpatient follow up with hematology/oncology

## 2024-07-29 NOTE — DISCHARGE NOTE PROVIDER - NSDCMRMEDTOKEN_GEN_ALL_CORE_FT
acetaminophen 500 mg oral tablet: 2 tab(s) orally every 8 hours as needed for mild pain  cyclobenzaprine 10 mg oral tablet: 1 tab(s) orally 3 times a day as needed for Muscle Spasm  oxyCODONE 5 mg oral tablet: 1 tab(s) orally every 4 to 6 hours as needed for moderate to severe postoperative pain MDD: 6  tamsulosin 0.4 mg oral capsule: 1 cap(s) orally once a day (at bedtime)   acetaminophen 500 mg oral tablet: 2 tab(s) orally every 8 hours as needed for mild pain  amLODIPine 10 mg oral tablet: 1 tab(s) orally once a day  atenolol 25 mg oral tablet: 1 tab(s) orally once a day  atorvastatin 40 mg oral tablet: 1 tab(s) orally once a day (at bedtime)  cholecalciferol oral tablet: 2,000 unit(s) orally once a day 2000 unit(s) orally once a day; may purchase over the counter  cyclobenzaprine 10 mg oral tablet: 1 tab(s) orally 3 times a day as needed for Muscle Spasm  ofloxacin 0.3% ophthalmic solution: 1 drop(s) in each affected eye every 4 hours Use 1 drop in the affected eye every  four hours only while you are awake, for two days. Then, use 1 drop in each eye four times a day five more days.  oxyCODONE 5 mg oral tablet: 1 tab(s) orally every 4 to 6 hours as needed for moderate to severe postoperative pain MDD: 6  polyethylene glycol 3350 oral powder for reconstitution: 17 gram(s) orally once a day can take over the counter until bowel movement then as needed for constipation  tamsulosin 0.4 mg oral capsule: 1 cap(s) orally once a day (at bedtime)  Xarelto 10 mg oral tablet: 1 tab(s) orally once a day

## 2024-07-29 NOTE — DISCHARGE NOTE NURSING/CASE MANAGEMENT/SOCIAL WORK - PATIENT PORTAL LINK FT
You can access the FollowMyHealth Patient Portal offered by [facility] by registering at the following website: [email or web address]. By joining [employer]’s FollowMyHealth portal, you will also be able to view your health information using other applications (apps) compatible with our system.

## 2024-07-29 NOTE — DISCHARGE NOTE PROVIDER - CARE PROVIDERS DIRECT ADDRESSES
[email or web address]

## 2024-07-29 NOTE — PROGRESS NOTE ADULT - SUBJECTIVE AND OBJECTIVE BOX
Ortho Note    Pt seen and examined on morning rounds. Pt comfortable without complaints, pain controlled.  Denies CP, SOB, N/V, numbness/tingling     Vital Signs Last 24 Hrs  T(C): 36.6 (29 Jul 2024 05:08), Max: 36.9 (28 Jul 2024 20:48)  T(F): 97.9 (29 Jul 2024 05:08), Max: 98.4 (28 Jul 2024 20:48)  HR: 73 (29 Jul 2024 05:08) (72 - 98)  BP: 128/81 (29 Jul 2024 05:08) (110/61 - 128/81)  BP(mean): --  RR: 18 (29 Jul 2024 05:08) (16 - 18)  SpO2: 97% (29 Jul 2024 05:08) (97% - 99%)    Parameters below as of 29 Jul 2024 05:08  Patient On (Oxygen Delivery Method): room air      Physical Exam:  General: Pt Alert and oriented, NAD  - Left Hip Dressing CDI   ACE wrap  Pulses: +2 DP pulses, wpw toes   Sensation: silt intact bilateral LE  Motor: EHL/FHL/TA/GS- firing bilateral lower extremities      A/P: 79yMale s/p LEFT Femur Pathological Fx Biopsy and Provisional ORIF on 7/16 and LEFT Femur JOLLY and IMN on 7/22 by Dr. Shaw  - Stable  - Pain control; added flexeril for muscle spasms  - DVT ppx: Lovenox 40mg SubQ Q24h   - PT, WBS: WBAT LLE, ROMAT  - F/u Med Onc Recs - Final pathology B-Cell Lymphoma  - F/u hospitalist Recs  - PET Scan completed  - Bone marrow biopsy - pending results  - Dispo - Pending PT, Med Onc Recs about inpatient vs. outpatient lymphoma treatment

## 2024-07-29 NOTE — DISCHARGE NOTE PROVIDER - NSDCFUADDINST_GEN_ALL_CORE_FT
ACTIVITY:   - Weight bear as tolerated with assistive device. No strenuous activity, heavy lifting, driving or returning to work until cleared by MD.   - Apply a cold compress to the surgical site several times daily to reduce pain and swelling. For icing, twenty-minute sessions followed by an hour off is recommended. Ice should NEVER be placed directly on the skin. Wearing compression stockings during the first week after surgery can help reduce swelling in your knee, calf and foot, but is not required.      Dressing/showering:  May remove ace wrap at home.   Gauze/tegaderm: can shower, pat dry afterward. Keep as clean/dry as possible. Maintain until follow up with Dr. Shaw.   Can change as needed if saturated/gets very wet or is peeling off.     MEDICATION/ANTICOAGULATION:   -You have been prescribed Xarelto 10mg once daily, as a preventative to help prevent postoperative blood clots. Please take this medication as prescribed.    - You have been prescribed medications for pain:      - Tylenol for mild to moderate pain. Do not exceed 3,000mg daily.     - For more severe pain, take Tylenol with the addition of narcotic pain medication. Take this medication as prescribed. This medication may cause drowsiness or dizziness. Do not operate machinery. This medication may cause constipation.   - For any additional medications, follow instructions on the bottle.    -Try to have regular bowel movements. Take stool softener or laxative if necessary. You may wish to take Miralax daily until you have regular bowel movements.    - If you have a pain management physician, please follow-up with them postoperatively.    - If you experience any negative side effects of your medications, please call your surgeon's office to discuss.     FOLLOW-UP:   - Call to schedule an appt with Dr. Shaw for follow up.  - Please follow-up with your primary care physician or any other specialist you see postoperatively, if needed.    - Contact your doctor or go to the emergency room if you experience: fever greater than 101.5, chills, chest pain, difficulty breathing, redness or excessive drainage around the incision, other concerns.     You were seen by urology during your admission. You had a garvey catheter which was removed and you have been voiding. You were started on flomax to help with urination, continue this medication as prescribed and follow up with your primary care provider**     **hematology/oncology follow-up***:  ACTIVITY:   - Weight bear as tolerated with assistive device. No strenuous activity, heavy lifting, driving or returning to work until cleared by MD.   - Apply a cold compress to the surgical site several times daily to reduce pain and swelling. For icing, twenty-minute sessions followed by an hour off is recommended. Ice should NEVER be placed directly on the skin. Wearing compression stockings during the first week after surgery can help reduce swelling in your knee, calf and foot, but is not required.      Dressing/showering:  May remove ace wrap at home.   Gauze/tegaderm: can shower, pat dry afterward. Keep as clean/dry as possible. Maintain until follow up with Dr. Shaw.   Can change as needed if saturated/gets very wet or is peeling off.     MEDICATION/ANTICOAGULATION:   -You have been prescribed Xarelto 10mg once daily, as a preventative to help prevent postoperative blood clots. Please take this medication as prescribed.    - You have been prescribed medications for pain:      - Tylenol for mild to moderate pain. Do not exceed 3,000mg daily.     - For more severe pain, take Tylenol with the addition of narcotic pain medication. Take this medication as prescribed. This medication may cause drowsiness or dizziness. Do not operate machinery. This medication may cause constipation.   - For any additional medications, follow instructions on the bottle.    -Try to have regular bowel movements. Take stool softener or laxative if necessary. You may wish to take Miralax daily until you have regular bowel movements.    - If you have a pain management physician, please follow-up with them postoperatively.    - If you experience any negative side effects of your medications, please call your surgeon's office to discuss.     FOLLOW-UP:   - Call to schedule an appt with Dr. Shaw for follow up.  - Please follow-up with your primary care physician or any other specialist you see postoperatively, if needed.    - Contact your doctor or go to the emergency room if you experience: fever greater than 101.5, chills, chest pain, difficulty breathing, redness or excessive drainage around the incision, other concerns.     You were seen by urology during your admission. You had a garvey catheter which was removed and you have been voiding. You were started on flomax to help with urination, continue this medication as prescribed and follow up with your primary care provider**     **hematology/oncology follow-up***: You have a follow-up appointment with dr. Polk on 8/15/24 at 11AM. Follow-up as directed.  ACTIVITY:   - Weight bear as tolerated with assistive device. No strenuous activity, heavy lifting, driving or returning to work until cleared by MD.   - Apply a cold compress to the surgical site several times daily to reduce pain and swelling. For icing, twenty-minute sessions followed by an hour off is recommended. Ice should NEVER be placed directly on the skin. Wearing compression stockings during the first week after surgery can help reduce swelling in your knee, calf and foot, but is not required.      Dressing/showering:  May remove ace wrap at home.   Gauze/tegaderm: can shower, pat dry afterward. Keep as clean/dry as possible. Maintain until follow up with Dr. Shaw.   Can change as needed if saturated/gets very wet or is peeling off.     MEDICATION/ANTICOAGULATION:   -You have been prescribed Xarelto 10mg once daily, as a preventative to help prevent postoperative blood clots. Please take this medication as prescribed.    - You have been prescribed medications for pain:      - Tylenol for mild to moderate pain. Do not exceed 3,000mg daily.     - For more severe pain, take Tylenol with the addition of narcotic pain medication. Take this medication as prescribed. This medication may cause drowsiness or dizziness. Do not operate machinery. This medication may cause constipation.   - For any additional medications, follow instructions on the bottle.    -Try to have regular bowel movements. Take stool softener or laxative if necessary. You may wish to take Miralax daily until you have regular bowel movements.    - If you have a pain management physician, please follow-up with them postoperatively.    - If you experience any negative side effects of your medications, please call your surgeon's office to discuss.     FOLLOW-UP:   - **Call to schedule an appt with Dr. Shaw for follow up**  - Please follow-up with your primary care physician or any other specialist you see postoperatively, if needed.    - Contact your doctor or go to the emergency room if you experience: fever greater than 101.5, chills, chest pain, difficulty breathing, redness or excessive drainage around the incision, other concerns.     You were seen by urology during your admission. You had a garvey catheter which was removed and you have been voiding. You were started on flomax to help with urination, you may continue this medication as prescribed and follow up with your primary care provider**     **hematology/oncology follow-up***: You have a follow-up appointment with Dr. Polk on 8/15/24 at 11AM. Follow-up as directed.  ACTIVITY:   - Weight bear as tolerated with assistive device. No strenuous activity, heavy lifting, driving or returning to work until cleared by MD.   - Apply a cold compress to the surgical site several times daily to reduce pain and swelling. For icing, twenty-minute sessions followed by an hour off is recommended. Ice should NEVER be placed directly on the skin. Wearing compression stockings during the first week after surgery can help reduce swelling in your knee, calf and foot, but is not required.      Dressing/showering:  May remove ace wrap at home. Remove when showering. May re-apply for comfort.   Gauze/tegaderm: can shower, pat dry afterward. Keep as clean/dry as possible. Maintain until follow up with Dr. Shaw.   Can change as needed if saturated/gets very wet or is peeling off.     MEDICATION/ANTICOAGULATION:   -You have been prescribed Xarelto 10mg once daily, as a preventative to help prevent postoperative blood clots. Please take this medication as prescribed.    - You have been prescribed medications for pain:      - Tylenol for mild to moderate pain. Do not exceed 3,000mg daily.     - For more severe pain, take Tylenol with the addition of narcotic pain medication. Take this medication as prescribed. This medication may cause drowsiness or dizziness. Do not operate machinery. This medication may cause constipation.   - For any additional medications, follow instructions on the bottle.    -Try to have regular bowel movements. Take stool softener or laxative if necessary. You may wish to take Miralax daily until you have regular bowel movements.    - If you have a pain management physician, please follow-up with them postoperatively.    - If you experience any negative side effects of your medications, please call your surgeon's office to discuss.     FOLLOW-UP:   - **Call to schedule an appt with Dr. Shaw for follow up**  - Please follow-up with your primary care physician or any other specialist you see postoperatively, if needed.  Follow up with your cardiologist.   - Contact your doctor or go to the emergency room if you experience: fever greater than 101.5, chills, chest pain, difficulty breathing, redness or excessive drainage around the incision, other concerns.     You were seen by urology during your admission. You had a garvey catheter which was removed and you have been voiding. You were started on flomax to help with urination, you may continue this medication as prescribed and follow up with your urologist care provider**     **hematology/oncology follow-up***: You have a follow-up appointment with Dr. Polk on 8/15/24 at 11AM. Follow-up as directed.

## 2024-07-29 NOTE — DISCHARGE NOTE PROVIDER - NSDCCPCAREPLAN_GEN_ALL_CORE_FT
PRINCIPAL DISCHARGE DIAGNOSIS  Diagnosis: Closed fracture of shaft of left femur  Assessment and Plan of Treatment:

## 2024-07-29 NOTE — PROGRESS NOTE ADULT - ASSESSMENT
79M w HTN, HLD, Prostate CA s/p XRT w chronic L leg pain for months p/w fall after L leg gave out on him, found to have proximal femoral shaft fracture, s/p biopsy and provisional ORIF Dr. Shaw 7/16, pathology returning as B cell lymphoma - s/p IM Nail w Dr. Shaw 7/22, for Home w HPT    #B - Cell lymphoma - see below  #L Proximal femur fracture  - CT C/A/P wo evidence of metastatic disease. Enlarged prostate wo fiducials   - MR L femur: pathologic fracture w displacement - some soft tissue present suggestive of mass w permeative marrow changes extending to lesser trochanter and mid-distal 3rd of femoral diaphysis.    - bone scan - signal at femoral shaft   - SPEP, UPEP negative for monoclonal band   - PET: suspicions for primary osseous/bone lymphoma w B cell lymphoma as most common subtype. No disseminated/shraddha disease noted on read.     #Constipation - Resolved    #Post-op state - pain controlled. PPx: held today for OR. On bowel regimen and incentive spirometer   - tylenol 1g q8   - PRN: Oxycodone 5/10 q4 for mod/severe pain    #HTN - home on atenolol 25mg daily, amlodipine 10mg daily, ramipril 10mg daily  #HLD - home on lipitor 40mg daily  #Leukocytosis - resolved.   #Prostate CA - on flomax 0.4mg HS  #Vitamin D insufficiency - Suboptimal - borderline w 29.9   - increased supplementation to 2000 IU daily D3  #Hyponatremia - resolved    Plan  f/u BMBx results.   Can hold home ramipril on discharge. BP at target.     Appreciate heme-onc recs that pt can follow-up outpatient to initiate therapy. Pt's case to be discussed on tumor board TUE 7/30    For Home w HPT  Above d/w orthopedics

## 2024-07-29 NOTE — DISCHARGE NOTE PROVIDER - NSDCCPTREATMENT_GEN_ALL_CORE_FT
PRINCIPAL PROCEDURE  Procedure: Intramedullary nailing of femur  Findings and Treatment:       SECONDARY PROCEDURE  Procedure: Open biopsy, femur  Findings and Treatment:

## 2024-07-29 NOTE — CHART NOTE - NSCHARTNOTEFT_GEN_A_CORE
Admitting Diagnosis:   Patient is a 79y old  Male who presents with a chief complaint of L femur pathological fx (21 Jul 2024 13:53)    Current Nutrition Order:  Regular diet with Ensure Clear oral nutrition supplement 2x/day    Diet, NPO after Midnight:    NPO Start Date: 23-Jul-2024,   NPO Start Time: 23:59 (07-23-24 @ 11:31)    PO Intake: Good (%) [   ]  Fair (50-75%) [   ] Poor (<25%) [   ] *fair PO ~50-75%*    GI Issues: no noted distention in abdomen per nursing; no nausea or emesis, no diarrhea noted currently; BM on 7/20/24; potential constipation noted, pt is on a bowel regimen, RD to follow up    Pain: moderate (level 5) pain noted    Skin Integrity: left lower extremity surgical incision; no pressure ulcers; left ankle trace edema noted; Oni: 15      07-22-24 @ 07:01  -  07-23-24 @ 07:00  --------------------------------------------------------  IN: 0 mL / OUT: 550 mL / NET: -550 mL        Labs:   07-23    132<L>  |  98  |  18  ----------------------------<  101<H>  4.2   |  27  |  0.84    Ca    8.4      23 Jul 2024 07:06  Phos  3.7     07-23    TPro  5.6<L>  /  Alb  3.1<L>  /  TBili  0.7  /  DBili  <0.2  /  AST  83<H>  /  ALT  56<H>  /  AlkPhos  98  07-23    CAPILLARY BLOOD GLUCOSE    Medications:  MEDICATIONS  (STANDING):  acetaminophen     Tablet .. 1000 milliGRAM(s) Oral every 8 hours  amLODIPine   Tablet 10 milliGRAM(s) Oral daily  atenolol  Tablet 25 milliGRAM(s) Oral daily  atorvastatin 40 milliGRAM(s) Oral at bedtime  bisacodyl Suppository 10 milliGRAM(s) Rectal daily  cholecalciferol 2000 Unit(s) Oral daily  enoxaparin Injectable 40 milliGRAM(s) SubCutaneous every 24 hours  lactated ringers. 1000 milliLiter(s) (70 mL/Hr) IV Continuous <Continuous>  lactulose Syrup 20 Gram(s) Oral daily  polyethylene glycol 3350 17 Gram(s) Oral daily  povidone iodine 5% Nasal Swab 1 Application(s) Both Nostrils once  senna 1 Tablet(s) Oral daily  tamsulosin 0.4 milliGRAM(s) Oral at bedtime    MEDICATIONS  (PRN):  ALPRAZolam 0.25 milliGRAM(s) Oral two times a day PRN anxiety  bisacodyl 5 milliGRAM(s) Oral every 12 hours PRN Constipation  cyclobenzaprine 10 milliGRAM(s) Oral three times a day PRN Muscle Spasm  HYDROmorphone  Injectable 0.5 milliGRAM(s) IV Push every 4 hours PRN breakthrough pain on floor  oxyCODONE    IR 5 milliGRAM(s) Oral every 4 hours PRN Moderate Pain (4 - 6)  oxyCODONE    IR 10 milliGRAM(s) Oral every 4 hours PRN Severe Pain (7 - 10)    Dosing Anthropometrics:   Height for BMI (FEET)	5 Feet  Height for BMI (INCHES)	6 Inch(s)  Height for BMI (CENTIMETERS)	167.64 Centimeter(s)  Weight for BMI (lbs)	130.1 lb  Weight for BMI (kg)	59 kg  Body Mass Index	              20.9  Ideal body weight: 130 pounds, pt is ~100% of ideal body weight.     Weight Change: no new weights noted in electronic medical records; recommend that nursing obtain updated weights weekly. RD to monitor trends.     Estimated energy needs:  Weight used for calculations	current weight  Estimated Energy Needs Weight (lbs)	130 lb  Estimated Energy Needs Weight (kg)	59 kg  Estimated Energy Needs From (saurabh/kg)	23  Estimated Energy Needs To (saurabh/kg)	28  Estimated Energy Needs Calculated From (saurabh/kg)	1357  Estimated Energy Needs Calculated To (saurabh/kg)	1652  Weight used for calculations	current weight  Estimated Protein Needs Weight (lbs)	130 lb  Estimated Protein Needs Weight (kg)	59 kg  Estimated Protein Needs From (g/kg)	1.2  Estimated Protein Needs To (g/kg)	1.3  Estimated Protein Needs Calculated From (g/kg)	70.8  Estimated Protein Needs Calculated To (g/kg)	76.7  Estimated Fluid Needs Weight (lbs)	130 lb  Estimated Fluid Needs Weight (kg)	59 kg  Estimated Fluid Needs From (ml/kg)	23  Estimated Fluid Needs To (ml/kg)	28  Estimated Fluid Needs Calculated From (ml/kg)	1357  Estimated Fluid Needs Calculated To (ml/kg)	1652  Other Calculations	Pt is % ideal body weight, thus actual body weight used for all calculations. Needs adjusted for advanced age, clinical condition, potential preop status (per medical team).    Subjective: This is a 79 year old Male with complaints of left thigh pain status post fall at home. Unable to bear weight in the LLE since the fall. Denies head strike or loss of consciousness. Denies numbness/tingling in the LLE. Denies any other trauma/injuries at this time. At baseline, community ambulator without assistive devices.    RD spoke to pt at bedside for nutrition follow up evaluation. Pt's family was present at bedside. Pt was visited by the doctor when RD attempted to visit pt 2x, RD to follow up. RD spoke to nursing and medical team, obtained additional information from electronic medical records/chart documentation. Pt noted with no GI upset, BM on 7/20/24, potential constipation, pt on a bowel regimen, RD to follow up. Pt noted with fair PO intakes overall, ~50-75% overall. Labs reviewed: low sodium (132), serum Glucose (101), elevated AST (83), ALT (56), RD to monitor trends. Education deferred at this time. RD to follow up and remain available. Additional nutrition recommendations below.     Previous Nutrition Diagnosis: Inadequate Energy Intake related to decrease in appetite as evidenced by </=50% PO intakes since admission per pt and pt family reports    Active [   ]  Resolved [ x ]    If resolved, new PES: Increased nutrient demands related to clinical condition, postoperative demands as evidenced by status post biopsy and provisional ORIF    Goal: Pt to consistently meet at least 75% of EEE via tolerated route that is consistent with goals of care during hospital stay    Nutrition Recommendations:  1. Continue with current diet order    >>continue Ensure clear oral nutrition supplement 2x/day (240 calories, 8g protein per serving)  **provide jello and apples as snacks/nourishments per pt preferences**  2. Encourage pt to meet nutritional needs as able   3. Monitor PO intakes, trend weights (weekly), monitor skin integrity, monitor labs (electrolytes, CMP), monitor GI function  4. Encourage adherence to diet education (reinforce as able)  5. Continue vitamin D supplementation  6. Pain and bowel regimen per team  7. Will continue to assess/honor preferences as able   8. Align nutrition interventions with goals of care at all times    Education: deferred at this time. RD to follow up prn.     Risk Level: High [   ] Moderate [ x ] Low [   ].
Hematology/Oncology Chart Note    Patient scheduled for appointment at 11AM on 8/15/24 at [facility] ([address], 3rd floor). Please include in patient's discharge instructions.
Pt seen and examined approx 11:30am  79M w HTN, HLD, Prostate CA s/p XRT w chronic L leg pain for months p/w fall after L leg gave out on him, found to have proximal femoral shaft fracture, for OR w Dr. Pelayo 7/12    Pt reports before L leg pain began he would walk up 5-6 flights of stairs in his building for exercise wo chest pain, dyspnea. Also would run and swim regularly. Reports pain in L leg beginning several months ago - initially improved w celebrex and tylenol however pain worsened in the last several weeks that he needed to start walking w a cane. L leg gave out on him yesterday. No headstirike of LOC. Does report thoracic aortic aneurysm that he reports is 4cm and is being monitored outpatient. Denies fever, chills, sweats. Does take Vitamin D 1000 IU daily but denies h/o osteoporosis. Currently states pain is controlled.     #Pre-op evaluation   - EKG: Normal sinus rhythm   - METS >4   - RCRI: Class I risk   - CONDON: 0.2% Risk of myocardial infarction or cardiac arrest, intraoperatively or up to 30 days post-op    #L Proximal femur fracture   - see below    #HTN - home on atenolol 25mg daily, amlodipine 10mg daily, ramipril 10mg daily  #HLD - home on lipitor 40mg daily  #Leukocytosis - likely reactive; WBC 15.     Plan  Agree pt has good functional status and is low risk for intermediate risk procedure  Would hold ramipril today  Can resume atenolol and amlodipine POD1 w hold parameters (SBP <110; HR <50)  Would also add on vitamin D 25,OH-D. Agree w inflammatory work-up, biopsy d/t atypical pattern of fx. Can consider CT chest/abd/pelvis if concern for malignancy persists.    Co-management will continue to follow  Above d/w orthopedics
Wheelchair note:    A standard manual wheelchair will significantly improve the patients ability to participate in MRADLs. With a wheelchair, they will require less caregiver assistance, be mobile around the home for longer distances, have reduced fall risk, and be able to complete MRADLs while sitting. The patient is planning to use the wheelchair o na regular basis in the home and has not expressed an unwillingness to do so. Their home has adequate space and level surfaces to maneuver between rooms in a wheelchair. The patient will self-propel in the wheelchair. The patient as sufficient physical and mental capabilities needed to safely propel a wheelchair in the home. A walker, cane, or crutches alone would not be sufficient at resolving the patients mobility limitations because they have limited upper body strength, difficulty walking with an assistive device, and difficulty ambulating long distances at home.    The beneficiary has a musculoskeletal condition which prevents 90 degree flexion at the knee.
Admitting Diagnosis:   Patient is a 79y old  Male who presents with a chief complaint of CLOSED FRACTURE OF SHAFTOF LEFT FEMUR  (15 Jul 2024 13:53)  PAST MEDICAL & SURGICAL HISTORY:    Current Nutrition Order: regular diet with ensure clear oral nutrition supplement 2x/day (240 calories, 8g protein per serving)     PO Intake: Good (%) [   ]  Fair (50-75%) [   ] Poor (<25%) [   ]    GI Issues: no nausea/emesis noted; no diarrhea or constipation; BM on 7/18/24; no distention noted by nursing    Pain: severe (8)    Skin Integrity: left leg surgical incisions noted; no pressure ulcers; pitting 2+ edema to left leg; Oni: 15      07-18-24 @ 07:01  -  07-19-24 @ 07:00  --------------------------------------------------------  IN: 1560 mL / OUT: 675 mL / NET: 885 mL    07-19-24 @ 07:01  -  07-19-24 @ 15:15  --------------------------------------------------------  IN: 0 mL / OUT: 1600 mL / NET: -1600 mL      Labs:   07-19    133<L>  |  100  |  19  ----------------------------<  85  4.1   |  25  |  0.79    Ca    8.1<L>      19 Jul 2024 05:30      CAPILLARY BLOOD GLUCOSE    Medications:  MEDICATIONS  (STANDING):  acetaminophen     Tablet .. 1000 milliGRAM(s) Oral every 8 hours  amLODIPine   Tablet 10 milliGRAM(s) Oral daily  atenolol  Tablet 25 milliGRAM(s) Oral daily  atorvastatin 40 milliGRAM(s) Oral at bedtime  bisacodyl Suppository 10 milliGRAM(s) Rectal daily  cholecalciferol 2000 Unit(s) Oral daily  enoxaparin Injectable 40 milliGRAM(s) SubCutaneous every 24 hours  lactulose Syrup 20 Gram(s) Oral daily  polyethylene glycol 3350 17 Gram(s) Oral daily  senna 1 Tablet(s) Oral daily  sodium chloride 0.9%. 1000 milliLiter(s) (120 mL/Hr) IV Continuous <Continuous>  tamsulosin 0.4 milliGRAM(s) Oral at bedtime    MEDICATIONS  (PRN):  ALPRAZolam 0.5 milliGRAM(s) Oral daily PRN anxiety  bisacodyl 5 milliGRAM(s) Oral every 12 hours PRN Constipation  HYDROmorphone  Injectable 0.5 milliGRAM(s) IV Push every 4 hours PRN breakthrough pain on floor  oxyCODONE    IR 5 milliGRAM(s) Oral every 4 hours PRN Moderate Pain (4 - 6)  oxyCODONE    IR 10 milliGRAM(s) Oral every 4 hours PRN Severe Pain (7 - 10)    Dosing Anthropometrics:   Height for BMI (FEET)	5 Feet  Height for BMI (INCHES)	6 Inch(s)  Height for BMI (CENTIMETERS)	167.64 Centimeter(s)  Weight for BMI (lbs)	130.1 lb  Weight for BMI (kg)	59 kg  Body Mass Index	              20.9  Ideal body weight: 130 pounds, pt is ~100% of ideal body weight.     Weight Change: no new weights noted in electronic medical records; recommend that nursing obtain updated weights weekly. RD to monitor trends.     Estimated energy needs:  Weight used for calculations	current weight  Estimated Energy Needs Weight (lbs)	130 lb  Estimated Energy Needs Weight (kg)	59 kg  Estimated Energy Needs From (saurabh/kg)	23  Estimated Energy Needs To (saurabh/kg)	28  Estimated Energy Needs Calculated From (saurabh/kg)	1357  Estimated Energy Needs Calculated To (saurabh/kg)	1652  Weight used for calculations	current weight  Estimated Protein Needs Weight (lbs)	130 lb  Estimated Protein Needs Weight (kg)	59 kg  Estimated Protein Needs From (g/kg)	1.2  Estimated Protein Needs To (g/kg)	1.3  Estimated Protein Needs Calculated From (g/kg)	70.8  Estimated Protein Needs Calculated To (g/kg)	76.7  Estimated Fluid Needs Weight (lbs)	130 lb  Estimated Fluid Needs Weight (kg)	59 kg  Estimated Fluid Needs From (ml/kg)	23  Estimated Fluid Needs To (ml/kg)	28  Estimated Fluid Needs Calculated From (ml/kg)	1357  Estimated Fluid Needs Calculated To (ml/kg)	1652  Other Calculations	Pt is % ideal body weight, thus actual body weight used for all calculations. Needs adjusted for advanced age, clinical condition, potential preop status (per medical team).    Subjective: This is a 79 year old Male with complaints of left thigh pain status post fall at home. Unable to bear weight in the LLE since the fall. Denies head strike or loss of consciousness. Denies numbness/tingling in the LLE. Denies any other trauma/injuries at this time. At baseline, community ambulator without assistive devices.    RD spoke to pt at bedside for nutrition follow up evaluation. Pt's family was present at bedside. Pt noted with no GI upset, BM on 7/18/24. Pt noted with fair PO intakes overall, ~50% overall. Pt requested another flavor of the Ensure Clear supplements, and stated he does not enjoy the thicker Ensure oral nutrition supplements. RD told pt about the Berry flavor, RD to note this in pt food system/preferences. Labs reviewed: low sodium (133), elevated BUN (24), serum Glucose (104), RD to monitor trends. RD reviewed pt's diet with him, encouraged him to increase PO intakes as able of nutrient-dense foods, nourishments, oral nutrition supplements. Pt appeared receptive, verbalized understanding. RD to remain available. Additional nutrition recommendations below.     Previous Nutrition Diagnosis: Inadequate Energy Intake related to decrease in appetite as evidenced by </=50% PO intakes since admission per pt and pt family reports    Active [   ]  Resolved [ x ]    If resolved, new PES: Increased nutrient demands related to clinical condition, postoperative demands as evidenced by status post biopsy and provisional ORIF    Goal: Pt to consistently meet at least 75% of EEE via tolerated route that is consistent with goals of care during hospital stay    Nutrition Recommendations:  1. Continue with current diet order    >>Ensure clear oral nutrition supplement 2x/day (240 calories, 8g protein per serving)  **provide jello and apples as snacks/nourishments per pt preferences**  2. Encourage pt to meet nutritional needs as able   3. Monitor PO intakes, trend weights (weekly), monitor skin integrity, monitor labs (electrolytes, CMP), monitor GI function  4. Encourage adherence to diet education (reinforce as able)  5. Continue vitamin D supplementation  6. Pain and bowel regimen per team  7. Will continue to assess/honor preferences as able   8. Align nutrition interventions with goals of care at all times    Education: RD reviewed pt's diet with him, encouraged him to increase PO intakes as able of nutrient-dense foods, nourishments, oral nutrition supplements. Pt appeared receptive, verbalized understanding.     Risk Level: High [   ] Moderate [ x ] Low [   ]

## 2024-07-29 NOTE — PROGRESS NOTE ADULT - SUBJECTIVE AND OBJECTIVE BOX
INTERVAL HPI/OVERNIGHT EVENTS: sravanthi o/n    SUBJECTIVE: Patient seen and examined at bedside.   Pt reports some pruritis in his R eye w crusting present on his eyelids. He denies pain, rhinorrhea, vision changes. Tried using artificial tears wo improvement.     Reports pain in L leg is controlled. No numbness. Ambulating wo LH/dizziness, chest pain, dyspnea. No fever, dysuria.    OBJECTIVE:    VITAL SIGNS:  ICU Vital Signs Last 24 Hrs  T(C): 36.7 (29 Jul 2024 08:39), Max: 36.9 (28 Jul 2024 20:48)  T(F): 98.1 (29 Jul 2024 08:39), Max: 98.4 (28 Jul 2024 20:48)  HR: 70 (29 Jul 2024 08:39) (70 - 75)  BP: 107/66 (29 Jul 2024 08:39) (107/66 - 128/81)  BP(mean): --  ABP: --  ABP(mean): --  RR: 18 (29 Jul 2024 08:39) (16 - 18)  SpO2: 99% (29 Jul 2024 08:39) (97% - 99%)    O2 Parameters below as of 29 Jul 2024 08:39  Patient On (Oxygen Delivery Method): room air              07-28 @ 07:01  -  07-29 @ 07:00  --------------------------------------------------------  IN: 480 mL / OUT: 850 mL / NET: -370 mL      CAPILLARY BLOOD GLUCOSE          PHYSICAL EXAM:  GEN: Male in NAD on RA  HEENT: NC/AT, MMM  CV: RRR, nml S1S2, no murmurs  PULM: nml effort, CTAB  ABD: Soft, mild distended, NABS, non-tender  NEURO  A/O x3, moving all extremities, Sensation intact  L leg in ACE bandage. 5/5 in plantarflex/ext b/l  PSYCH: Appropriate    MEDICATIONS:  MEDICATIONS  (STANDING):  acetaminophen     Tablet .. 1000 milliGRAM(s) Oral every 8 hours  amLODIPine   Tablet 10 milliGRAM(s) Oral daily  atenolol  Tablet 25 milliGRAM(s) Oral daily  atorvastatin 40 milliGRAM(s) Oral at bedtime  bisacodyl Suppository 10 milliGRAM(s) Rectal daily  cholecalciferol 2000 Unit(s) Oral daily  enoxaparin Injectable 40 milliGRAM(s) SubCutaneous every 24 hours  lactated ringers. 1000 milliLiter(s) (70 mL/Hr) IV Continuous <Continuous>  lactulose Syrup 20 Gram(s) Oral daily  polyethylene glycol 3350 17 Gram(s) Oral daily  povidone iodine 5% Nasal Swab 1 Application(s) Both Nostrils once  senna 1 Tablet(s) Oral daily  tamsulosin 0.4 milliGRAM(s) Oral at bedtime    MEDICATIONS  (PRN):  bisacodyl 5 milliGRAM(s) Oral every 12 hours PRN Constipation  cyclobenzaprine 10 milliGRAM(s) Oral three times a day PRN Muscle Spasm  oxyCODONE    IR 10 milliGRAM(s) Oral every 4 hours PRN Severe Pain (7 - 10)  oxyCODONE    IR 5 milliGRAM(s) Oral every 4 hours PRN Moderate Pain (4 - 6)      ALLERGIES:  Allergies    No Known Allergies    Intolerances        LABS:                        11.3   6.10  )-----------( 432      ( 29 Jul 2024 05:30 )             32.6     07-29    136  |  103  |  16  ----------------------------<  102<H>  4.2   |  24  |  0.76    Ca    8.3<L>      29 Jul 2024 05:30        Urinalysis Basic - ( 29 Jul 2024 05:30 )    Color: x / Appearance: x / SG: x / pH: x  Gluc: 102 mg/dL / Ketone: x  / Bili: x / Urobili: x   Blood: x / Protein: x / Nitrite: x   Leuk Esterase: x / RBC: x / WBC x   Sq Epi: x / Non Sq Epi: x / Bacteria: x        RADIOLOGY & ADDITIONAL TESTS: Reviewed.

## 2024-07-29 NOTE — DISCHARGE NOTE PROVIDER - CARE PROVIDER_API CALL
Sony Shaw  Orthopaedic Surgery  [address]  [place] [address]  Phone: [phone]  Fax: [phone]  Follow Up Time:    Sony Shaw  Orthopaedic Surgery  [address]  [place] [address]  Phone: [phone]  Fax: [phone]  Follow Up Time:     Cricket Polk  Medical Oncology  [address]  [place] [address]  Phone: [phone]  Fax: [phone]  Follow Up Time:

## 2024-07-29 NOTE — DISCHARGE NOTE PROVIDER - PROVIDER TOKENS
PROVIDER:[TOKEN:[[id number]]] PROVIDER:[TOKEN:[[id number]]],PROVIDER:[TOKEN:[[id number]]]

## 2024-07-29 NOTE — PROGRESS NOTE ADULT - PROVIDER SPECIALTY LIST ADULT
Heme/Onc
Hospitalist
Hospitalist
Orthopedics
Urology
Hospitalist
Internal Medicine
Orthopedics
Hospitalist
Internal Medicine
poor plus

## 2024-07-29 NOTE — DISCHARGE NOTE PROVIDER - NSDCFUSCHEDAPPT_GEN_ALL_CORE_FT
[facility]  [facility] [address]  Scheduled Appointment: 08/15/2024

## 2024-07-30 ENCOUNTER — NON-APPOINTMENT (OUTPATIENT)
Age: 79
End: 2024-07-30

## 2024-07-30 LAB — HEMATOPATHOLOGY REPORT: SIGNIFICANT CHANGE UP

## 2024-08-02 DIAGNOSIS — K59.00 CONSTIPATION, UNSPECIFIED: ICD-10-CM

## 2024-08-02 DIAGNOSIS — E78.5 HYPERLIPIDEMIA, UNSPECIFIED: ICD-10-CM

## 2024-08-02 DIAGNOSIS — M84.452A PATHOLOGICAL FRACTURE, LEFT FEMUR, INITIAL ENCOUNTER FOR FRACTURE: ICD-10-CM
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
## 2024-08-02 DIAGNOSIS — I10 ESSENTIAL (PRIMARY) HYPERTENSION: ICD-10-CM

## 2024-08-02 DIAGNOSIS — E87.1 HYPO-OSMOLALITY AND HYPONATREMIA: ICD-10-CM
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
## 2024-08-02 DIAGNOSIS — E55.9 VITAMIN D DEFICIENCY, UNSPECIFIED: ICD-10-CM

## 2024-08-02 DIAGNOSIS — R33.9 RETENTION OF URINE, UNSPECIFIED: ICD-10-CM

## 2024-08-02 DIAGNOSIS — M62.82 RHABDOMYOLYSIS: ICD-10-CM

## 2024-08-02 DIAGNOSIS — C85.10 UNSPECIFIED B-CELL LYMPHOMA, UNSPECIFIED SITE: ICD-10-CM

## 2024-08-09 ENCOUNTER — APPOINTMENT (OUTPATIENT)
Dept: ORTHOPEDIC SURGERY | Facility: CLINIC | Age: 79
End: 2024-08-09

## 2024-08-09 PROBLEM — C85.99: Status: ACTIVE | Noted: 2024-08-09

## 2024-08-09 PROBLEM — C83.30 DIFFUSE LARGE B-CELL LYMPHOMA, UNSPECIFIED BODY REGION: Status: ACTIVE | Noted: 2024-08-09

## 2024-08-09 PROCEDURE — 99024 POSTOP FOLLOW-UP VISIT: CPT

## 2024-08-11 NOTE — ADDENDUM
[FreeTextEntry1] : I, Jr Adair, documented this note as a scribe on behalf of Dr. Sony Shaw on 08/09/2024.

## 2024-08-11 NOTE — END OF VISIT
[FreeTextEntry3] : All medical record entries made by the Scribe were at my, Dr. Sony Shaw, direction and personally dictated by me on 08/09/2024. I have reviewed the chart and agree that the record accurately reflects my personal performance of the history, physical exam, assessment and plan. I have also personally directed, reviewed, and agreed with the chart.

## 2024-08-11 NOTE — HISTORY OF PRESENT ILLNESS
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 He can walk with a walker and can stand without assistance. He complains of some stiffness of his left knee. He is seeing another hematology/oncology specialist at [facility] that will be starting R-CHOP regimen starting next week. He will start this for several months until completing radiation to the thigh later. 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 His Lachmann screw incisions at the knee still have not healed completely but do not appear to be infected. Left knee ROM is from 0-80 degrees, can be pushed a little bit farther. 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 I will write him for PT and he can start weightbearing as tolerated. [de-identified] : Follow up in 4-6 weeks for radiographic and clinical surveillance. He is being treated medically at [facility] for medical oncology.  If imaging or pathology/biopsy was ordered, the patient was told to make an appointment to review findings right after all imaging is completed.   We discussed risks, benefits and alternatives. Rationale of care was reviewed and all questions were answered. Patient (and family) had all questions answered to an agreeable level of satisfaction. Patient (and family) expressed understanding and interest in proceeding with the plan as outlined.

## 2024-08-12 NOTE — HISTORY OF PRESENT ILLNESS
[de-identified] : Kurt Bliss is an 79-year-old male who presents to the clinic for initial consultation.   Surgical Pathology Report - Auth (Verified) Specimen(s) Submitted 1  Left pathologic femur fracture 2  Left patholgic femur fracture   Final Diagnosis 1, 2.  Femur, left, pathologic fracture: -   Predominantly necrotic tissue with lymphoproliferative proliferation compatible with B-cell lymphoma.  See note.  Hematopathology Report - Auth (Verified) Specimen(s) Submitted 1  Bone marrow core 2  Bone marrow clot 3  Bone marrow aspirate slides 4  Flow cytometry and cytogenetics   Final Diagnosis Bone marrow, aspirate and biopsy: -   Normocellular marrow with trilineage hematopoiesis and no morphologic or immunophenotypic evidence of involvement by B-cell lymphoma.  Bone marrow, aspirate, flow cytometric immunophenotyping: -   No increase in CD34-positive blasts. -   Heterogeneous lymphocytes without aberrant antigen expression or B-cell monotypia. See addendum.  7/12/24: CT CAP:  Displaced left femoral fracture. Please refer to the separate report of CT of the lower extremity. No other suspicious bony lesions. Enlarged prostate. Fiducials in place. No evidence of metastatic disease in the chest, abdomen and pelvis. 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  7/22/24: Xray Femur: Status post intramedullary nail of the left femur with improved anatomic alignment. Postoperative soft tissue swelling and subcutaneous air. Osteoarthritis.  7/26/24: PET/CT: Abnormal skull-to-thigh FDG-PET/CT scan.  1. Interval proximal femoral ORIF with associated postoperative changes in the left femur. Redemonstrated proximal to mid distal 3rd femur permeative changes with non-FDG avid circumferential expansile soft tissue mass, which may represent necrosis and/or hematoma. In addition, there are numerous scattered heterogeneously attenuating hypermetabolic areas within the soft tissues surrounding the proximal to mid distal 3rd femur (SUV max 12.1). Findings are highly suspicious for primary osseous/bone lymphoma (PBL), especially with recent tissue pathology results of diffuse large B-cell lymphoma (DLBCL), which is the most common subtype. No evidence of disseminated or shraddha disease.  2. Additional nonspecific increased FDG activity along the T12 posterior vertebral body (SUV max 3.7) and along the L1 posterior elements (SUV max 4.3), both without specific CT correlates. Findings again may represent spectrum of findings.  Lymphoma is not strongly suspected but cannot be certain the..  If there is high clinical suspicion, MRI of these areas may be helpful.  3. Left proximal medial thigh skin thickening and subcutaneous fat infiltration with minimally asymmetrically increased cutaneous FDG uptake (SUV max 1.0 compared to SUV max of 0.6 on contralateral side), which may represent nonspecific inflammatory reaction versus cellulitis. Recommend clinical correlation.

## 2024-08-15 ENCOUNTER — APPOINTMENT (OUTPATIENT)
Dept: HEMATOLOGY ONCOLOGY | Facility: CLINIC | Age: 79
End: 2024-08-15

## 2024-09-06 ENCOUNTER — APPOINTMENT (OUTPATIENT)
Dept: ORTHOPEDIC SURGERY | Facility: CLINIC | Age: 79
End: 2024-09-06
Payer: MEDICARE

## 2024-09-06 DIAGNOSIS — C83.30 DIFFUSE LARGE B-CELL LYMPHOMA, UNSPECIFIED SITE: ICD-10-CM

## 2024-09-06 DIAGNOSIS — M84.552A PATHOLOGICAL FRACTURE IN NEOPLASTIC DISEASE, LEFT FEMUR, INITIAL ENCOUNTER FOR FRACTURE: ICD-10-CM

## 2024-09-06 PROCEDURE — 73552 X-RAY EXAM OF FEMUR 2/>: CPT | Mod: RT

## 2024-09-06 PROCEDURE — 99024 POSTOP FOLLOW-UP VISIT: CPT

## 2024-09-08 NOTE — ADDENDUM
[FreeTextEntry1] : All medical record entries made by the Scribe were at my, Dr. Sony Sahw, direction and personally dictated by me on 09/06/2024. I have reviewed the chart and agree that the record accurately reflects my personal performance of the history, physical exam, assessment and plan. I have also personally directed, reviewed, and agreed with the chart.

## 2024-09-08 NOTE — END OF VISIT
[FreeTextEntry3] : All medical record entries made by the Scribe were at my, Dr. Sony Shaw, direction and personally dictated by me on 09/06/2024. I have reviewed the chart and agree that the record accurately reflects my personal performance of the history, physical exam, assessment and plan. I have also personally directed, reviewed, and agreed with the chart.

## 2024-09-08 NOTE — ADDENDUM
[FreeTextEntry1] : All medical record entries made by the Scribe were at my, Dr. Sony Shaw, direction and personally dictated by me on 09/06/2024. I have reviewed the chart and agree that the record accurately reflects my personal performance of the history, physical exam, assessment and plan. I have also personally directed, reviewed, and agreed with the chart.

## 2024-09-08 NOTE — HISTORY OF PRESENT ILLNESS
[___ Weeks Post Op] : [unfilled] weeks post op [2] : the patient reports pain that is 2/10 in severity [Clean/Dry/Intact] : clean, dry and intact [Neuro Intact] : an unremarkable neurological exam [Vascular Intact] : ~T peripheral vascular exam normal [Negative David's] : maneuvers demonstrated a negative David's sign [Doing Well] : is doing well [Excellent Pain Control] : has excellent pain control [No Sign of Infection] : is showing no signs of infection [de-identified] : 7/22/2024 - IM nail for left femur pathologic fracture secondary to B-cell lymphoma 7/16/2024 - biopsy of left femur with short revisional ORIF [de-identified] : Patient is feeling generally well. He recently had his first session of chemotherapy. He is able to walk with a cane, without a cane for one city block. He is continuing with PT twice weekly. [de-identified] : On exam patient is walking with a cane. He is able to do SLS although his balance is slightly off and I do not think he is able to weightbear. ROM on the left is 115 degrees, 15 shy of the other side. His incisions are clean, dry, and painless. [de-identified] : X-rays today (09/06/2024), multiple views of the right femur, show the IM nail in good position. There is significant callusing at the fracture site, although the bone does look very abnormal from the lymphoma. [de-identified] : 6 weeks s/p ORIF of left femur and IM nail placement for lymphoma-related pathologic fracture. He continues with increasing ROM and gait stability. There is good healing seen. He is on chemotherapy and is tolerating it. He is not going to have radiation for another few months. I would like to see him in 2 months for radiographic and clinical surveillance. [de-identified] : Follow up in 2 months.  If imaging or pathology/biopsy was ordered, the patient was told to make an appointment to review findings right after all imaging is completed.   We discussed risks, benefits and alternatives. Rationale of care was reviewed and all questions were answered. Patient (and family) had all questions answered to an agreeable level of satisfaction. Patient (and family) expressed understanding and interest in proceeding with the plan as outlined.

## 2024-09-08 NOTE — HISTORY OF PRESENT ILLNESS
[___ Weeks Post Op] : [unfilled] weeks post op [2] : the patient reports pain that is 2/10 in severity [Clean/Dry/Intact] : clean, dry and intact [Neuro Intact] : an unremarkable neurological exam [Vascular Intact] : ~T peripheral vascular exam normal [Negative David's] : maneuvers demonstrated a negative David's sign [Doing Well] : is doing well [Excellent Pain Control] : has excellent pain control [No Sign of Infection] : is showing no signs of infection [de-identified] : 7/22/2024 - IM nail for left femur pathologic fracture secondary to B-cell lymphoma 7/16/2024 - biopsy of left femur with short revisional ORIF [de-identified] : Patient is feeling generally well. He recently had his first session of chemotherapy. He is able to walk with a cane, without a cane for one city block. He is continuing with PT twice weekly. [de-identified] : On exam patient is walking with a cane. He is able to do SLS although his balance is slightly off and I do not think he is able to weightbear. ROM on the left is 115 degrees, 15 shy of the other side. His incisions are clean, dry, and painless. [de-identified] : X-rays today (09/06/2024), multiple views of the right femur, show the IM nail in good position. There is significant callusing at the fracture site, although the bone does look very abnormal from the lymphoma. [de-identified] : 6 weeks s/p ORIF of left femur and IM nail placement for lymphoma-related pathologic fracture. He continues with increasing ROM and gait stability. There is good healing seen. He is on chemotherapy and is tolerating it. He is not going to have radiation for another few months. I would like to see him in 2 months for radiographic and clinical surveillance. [de-identified] : Follow up in 2 months.  If imaging or pathology/biopsy was ordered, the patient was told to make an appointment to review findings right after all imaging is completed.   We discussed risks, benefits and alternatives. Rationale of care was reviewed and all questions were answered. Patient (and family) had all questions answered to an agreeable level of satisfaction. Patient (and family) expressed understanding and interest in proceeding with the plan as outlined.

## 2024-11-01 ENCOUNTER — APPOINTMENT (OUTPATIENT)
Dept: ORTHOPEDIC SURGERY | Facility: CLINIC | Age: 79
End: 2024-11-01
Payer: MEDICARE

## 2024-11-01 DIAGNOSIS — M84.552A PATHOLOGICAL FRACTURE IN NEOPLASTIC DISEASE, LEFT FEMUR, INITIAL ENCOUNTER FOR FRACTURE: ICD-10-CM

## 2024-11-01 PROCEDURE — 99214 OFFICE O/P EST MOD 30 MIN: CPT

## 2024-11-01 PROCEDURE — G2211 COMPLEX E/M VISIT ADD ON: CPT

## 2024-11-01 PROCEDURE — 73552 X-RAY EXAM OF FEMUR 2/>: CPT | Mod: LT

## 2025-01-03 ENCOUNTER — NON-APPOINTMENT (OUTPATIENT)
Age: 80
End: 2025-01-03

## 2025-01-03 ENCOUNTER — APPOINTMENT (OUTPATIENT)
Dept: ORTHOPEDIC SURGERY | Facility: CLINIC | Age: 80
End: 2025-01-03
Payer: MEDICARE

## 2025-01-03 PROCEDURE — 99214 OFFICE O/P EST MOD 30 MIN: CPT

## 2025-01-09 ENCOUNTER — TRANSCRIPTION ENCOUNTER (OUTPATIENT)
Age: 80
End: 2025-01-09

## 2025-04-25 ENCOUNTER — APPOINTMENT (OUTPATIENT)
Dept: ORTHOPEDIC SURGERY | Facility: CLINIC | Age: 80
End: 2025-04-25
Payer: MEDICARE

## 2025-04-25 DIAGNOSIS — M84.552A PATHOLOGICAL FRACTURE IN NEOPLASTIC DISEASE, LEFT FEMUR, INITIAL ENCOUNTER FOR FRACTURE: ICD-10-CM

## 2025-04-25 DIAGNOSIS — C85.99 NON-HODGKIN LYMPHOMA, UNSPECIFIED, EXTRANODAL AND SOLID ORGAN SITES: ICD-10-CM

## 2025-04-25 PROCEDURE — 73552 X-RAY EXAM OF FEMUR 2/>: CPT | Mod: LT

## 2025-04-25 PROCEDURE — 99214 OFFICE O/P EST MOD 30 MIN: CPT

## 2025-06-26 ENCOUNTER — APPOINTMENT (OUTPATIENT)
Dept: ORTHOPEDIC SURGERY | Age: 80
End: 2025-06-26

## 2025-08-19 ENCOUNTER — APPOINTMENT (OUTPATIENT)
Dept: ORTHOPEDIC SURGERY | Age: 80
End: 2025-08-19
Payer: MEDICARE

## 2025-08-19 DIAGNOSIS — M65.311 TRIGGER THUMB, RIGHT THUMB: ICD-10-CM

## 2025-08-19 PROCEDURE — 20550 NJX 1 TENDON SHEATH/LIGAMENT: CPT | Mod: F5

## 2025-08-19 PROCEDURE — 99214 OFFICE O/P EST MOD 30 MIN: CPT | Mod: 25

## (undated) DEVICE — DRAPE C ARM 41X74"

## (undated) DEVICE — MARKING PEN W RULER

## (undated) DEVICE — DRILL BIT CARBOFIX ORTHO 4.2X180MM

## (undated) DEVICE — SUT VICRYL 3-0 18" PS-1 UNDYED

## (undated) DEVICE — DRAPE 3/4 SHEET 52X76"

## (undated) DEVICE — DRAPE C ARM C-ARMOUR

## (undated) DEVICE — GLV 7.5 PROTEXIS (WHITE)

## (undated) DEVICE — SUT MONOCRYL 3-0 18" PS-1

## (undated) DEVICE — GLV 8.5 PROTEXIS (WHITE)

## (undated) DEVICE — CLIPPER BLADE GENERAL USE

## (undated) DEVICE — REAMER STRYKER ORTHO SHAFT BIXCUT MOD 450MM

## (undated) DEVICE — ELCTR STRYKER NEPTUNE SMOKE EVACUATION PENCIL (GREEN)

## (undated) DEVICE — DRSG COBAN 6"

## (undated) DEVICE — SUT STRATAFIX SPIRAL PDS PLUS 0 23X23CM CP-2 VIOLET

## (undated) DEVICE — SUT VICRYL 0 36" CT-1 UNDYED

## (undated) DEVICE — DRAPE C ARM UNIVERSAL

## (undated) DEVICE — DRAPE BACK TABLE COVER 80X90"

## (undated) DEVICE — SAW BLADE STRYKER SAGITTAL 81.5X12.5X1.19MM

## (undated) DEVICE — WARMING BLANKET UPPER ADULT

## (undated) DEVICE — BASIN SET SINGLE

## (undated) DEVICE — DRSG WEBRIL 6"

## (undated) DEVICE — GLV 8 PROTEXIS (WHITE)

## (undated) DEVICE — PACK BASIC GOWN MAYO COVER

## (undated) DEVICE — DRAPE U ORTHOMAX

## (undated) DEVICE — DRAPE HEAVY DUTY TOP 59" X 112"

## (undated) DEVICE — PREP CHLORAPREP HI-LITE ORANGE 26ML

## (undated) DEVICE — VENODYNE/SCD SLEEVE CALF MEDIUM

## (undated) DEVICE — DRAPE LIGHT HANDLE COVER (BLUE)

## (undated) DEVICE — DRAPE STERI-DRAPE INCISE 32X33"

## (undated) DEVICE — DRAPE MAYO STAND 30"

## (undated) DEVICE — SUT ETHILON 3-0 18" PS-1

## (undated) DEVICE — POSITIONER FOAM EGG CRATE ULNAR 2PCS (PINK)

## (undated) DEVICE — SUT VICRYL 2-0 27" CT-1 UNDYED

## (undated) DEVICE — DRAPE 1/2 SHEET 40X57"

## (undated) DEVICE — DRAPE SHOWER CURTAIN ISOLATION

## (undated) DEVICE — SYR ASEPTO

## (undated) DEVICE — Device